# Patient Record
Sex: MALE | Race: WHITE | NOT HISPANIC OR LATINO | Employment: UNEMPLOYED | URBAN - METROPOLITAN AREA
[De-identification: names, ages, dates, MRNs, and addresses within clinical notes are randomized per-mention and may not be internally consistent; named-entity substitution may affect disease eponyms.]

---

## 2017-01-10 NOTE — PROGRESS NOTES
SUBJECTIVE:                                                    Alejandro Alves is a 51 year old male who presents to clinic today for the following health issues: Patient is here with significant other     The 10-year ASCVD risk score (Corrina MG Jr., et al., 2013) is: 6.1%    Values used to calculate the score:      Age: 51 years      Sex: Male      Is an : No      Diabetic: No      Tobacco smoker: Yes      Systolic Blood Pressure: 101 mmHg      Prescribed Antihypertensives: No      HDL Cholesterol: 47 mg/dL      Total Cholesterol: 198 mg/dL  Patient is eligible for use of low-dose aspirin for primary prevention of heart attack and stroke.  Provider has discussed aspirin with patient and our decision was:             Arm Pain     Onset: 3 weeks ago      Description:   Location: right shoulder/right arm  Character: Sharp    Intensity: 8/10 (at its worst)    Progression of Symptoms: same    Accompanying Signs & Symptoms:  Other symptoms: radiation of pain to hand   History:   Previous similar pain: no       Precipitating factors:   Trauma or overuse: YES- patient fell on ice and fell on the right arm and shoulder , was seen in the ER, had an x ray which showed no abnormality , but reports pain is worse    Alleviating factors:  Improved by: NSAID - ibuprofen and arthritis strength tylenol        Therapies Tried and outcome: Patient has tried icing his shoulder and alternating tylenol and ibuprofen and he gets little to no relief.  Reports pain at night and laying on the shoulder he screams . He reports frostbite symptoms better     Significant other will like me to check a lump on the upper back , has been present for a long time , at times reduces in size .           Problem list and histories reviewed & adjusted, as indicated.  Additional history: as documented    Patient Active Problem List   Diagnosis     Tobacco use disorder     Restless legs syndrome (RLS)     Hip pain, right     Past  "Surgical History   Procedure Laterality Date     Colonoscopy N/A 6/6/2016     Procedure: COMBINED COLONOSCOPY, SINGLE OR MULTIPLE BIOPSY/POLYPECTOMY BY BIOPSY;  Surgeon: Theron Vigil MD;  Location:  GI       Social History   Substance Use Topics     Smoking status: Current Every Day Smoker -- 1.00 packs/day     Smokeless tobacco: Not on file      Comment: patient states he would like to      Alcohol Use: No     History reviewed. No pertinent family history.      Current Outpatient Prescriptions   Medication Sig Dispense Refill     traMADol (ULTRAM) 50 MG tablet Take 1-2 tablets ( mg) by mouth every 6 hours as needed for pain maximum 4-8 tablet(s) per day 30 tablet 0     ibuprofen (ADVIL/MOTRIN) 600 MG tablet Take 1 tablet (600 mg) by mouth every 6 hours as needed for moderate pain 30 tablet 0     rOPINIRole (REQUIP) 0.25 MG tablet Take 3 tablets (0.75 mg) by mouth 3 times daily 810 tablet 3     Ferrous Sulfate (IRON SUPPLEMENT PO)        No Known Allergies  BP Readings from Last 3 Encounters:   01/11/17 116/60   12/27/16 101/78   06/06/16 109/73    Wt Readings from Last 3 Encounters:   01/11/17 164 lb 1.6 oz (74.435 kg)   12/27/16 161 lb 7 oz (73.228 kg)   05/02/16 181 lb 9.6 oz (82.373 kg)                  Labs reviewed in EPIC  Problem list, Medication list, Allergies, and Medical/Social/Surgical histories reviewed in [facility] and updated as appropriate.    ROS:  C: NEGATIVE for fever, chills, change in weight  INTEGUMENTARY/SKIN: POSITIVE for lump of skin   E/M: NEGATIVE for ear, mouth and throat problems  R: NEGATIVE for significant cough or SOB  CV: NEGATIVE for chest pain, palpitations or peripheral edema  MUSCULOSKELETAL: POSITIVE  for right shoulder pain due to injury     OBJECTIVE:                                                    /60 mmHg  Pulse 78  Temp(Src) 97.9  F (36.6  C) (Oral)  Resp 14  Ht 5' 8.5\" (1.74 m)  Wt 164 lb 1.6 oz (74.435 kg)  BMI 24.59 kg/m2  Body mass index " is 24.59 kg/(m^2).   GENERAL: alert, well nourished, well hydrated, no distress    NECK: no tenderness, no adenopathy, no asymmetry, no masses, no stiffness; thyroid- normal to palpation  RESP: lungs clear to auscultation - no rales, no rhonchi, no wheezes  CV: regular rates and rhythm, normal S1 S2, no S3 or S4 and no murmur, no click or rub -  ABDOMEN: soft, no tenderness, no  hepatosplenomegaly, no masses, normal bowel sounds  SHOULDER Exam-Right   Inspection: no swelling, no bruising, no discoloration, no obvious deformity, no asymmetry, no glenohumeral joint anterior bulge, no distal clavicle elevation, no muscle atrophy, no scapular winging   Tenderness of: SC joint- no , clavicle(prox-mid)- no , clavicle-(mid-distal)- no , AC joint- YES, acromion- YES, anterior capsule- YES, prox bicep tendon- no , greater tuberosity- no , prox humerus- no , supraspinatous- YES, infraspinatous- YES, superior trapezious- YES, rhomboids- no    Range of Motion: Active- limited due to pain.  Range of Motion: Passive- limited due to pain.   Strength: forward flexion- 5/5 and abduction- 5/5, painful   Special tests: Nunez(supraspinatous)- POSITIVE   Opposite shoulder , exam is normal      SKIN:  Well rounded mass about 2 cm by 0.5 cm in the mid upper back , slightly mobile     Diagnostic test results:  Diagnostic Test Results:  Reviewed previous x ray      ASSESSMENT/PLAN:                                                    1. Right shoulder pain, unspecified chronicity  Discussed with patient , suspect rotator cuff pathology , likely a tear. Will get MRI for further evaluation   - MR Shoulder Right w/o Contrast; Future  - ORTHO  REFERRAL  - traMADol (ULTRAM) 50 MG tablet; Take 1-2 tablets ( mg) by mouth every 6 hours as needed for pain maximum 4-8 tablet(s) per day  Dispense: 30 tablet; Refill: 0    2. Sebaceous cyst  Discussed with patient and partner , usually benign  Plan :Discussed conservative management , if  not better recommend incision and drainage       Follow up With MRI result     Time: I spent 25 minutes of face- face time with patient  greater than one half devoted to coordination of care for diagnosis and plan above.           Marielena Keenan MD, MD  [facility]

## 2017-01-11 ENCOUNTER — OFFICE VISIT (OUTPATIENT)
Dept: FAMILY MEDICINE | Facility: OTHER | Age: 52
End: 2017-01-11
Payer: COMMERCIAL

## 2017-01-11 VITALS
HEART RATE: 78 BPM | TEMPERATURE: 97.9 F | BODY MASS INDEX: 24.3 KG/M2 | DIASTOLIC BLOOD PRESSURE: 60 MMHG | HEIGHT: 69 IN | RESPIRATION RATE: 14 BRPM | SYSTOLIC BLOOD PRESSURE: 116 MMHG | WEIGHT: 164.1 LBS

## 2017-01-11 DIAGNOSIS — L72.3 SEBACEOUS CYST: ICD-10-CM

## 2017-01-11 DIAGNOSIS — M25.511 RIGHT SHOULDER PAIN, UNSPECIFIED CHRONICITY: Primary | ICD-10-CM

## 2017-01-11 PROCEDURE — 99214 OFFICE O/P EST MOD 30 MIN: CPT | Performed by: FAMILY MEDICINE

## 2017-01-11 RX ORDER — TRAMADOL HYDROCHLORIDE 50 MG/1
50-100 TABLET ORAL EVERY 6 HOURS PRN
Qty: 30 TABLET | Refills: 0 | Status: SHIPPED | OUTPATIENT
Start: 2017-01-11 | End: 2018-08-27

## 2017-01-11 ASSESSMENT — PAIN SCALES - GENERAL: PAINLEVEL: EXTREME PAIN (8)

## 2017-01-11 NOTE — NURSING NOTE
"Chief Complaint   Patient presents with     Musculoskeletal Problem     Panel Management     tdap, flu       Initial /60 mmHg  Pulse 78  Temp(Src) 97.9  F (36.6  C) (Oral)  Resp 14  Ht 5' 8.5\" (1.74 m)  Wt 164 lb 1.6 oz (74.435 kg)  BMI 24.59 kg/m2 Estimated body mass index is 24.59 kg/(m^2) as calculated from the following:    Height as of this encounter: 5' 8.5\" (1.74 m).    Weight as of this encounter: 164 lb 1.6 oz (74.435 kg).  BP completed using cuff size: regular    Padmini Miramontes MA    "

## 2017-01-11 NOTE — MR AVS SNAPSHOT
After Visit Summary   1/11/2017    Alejandro Alves    MRN: [id number]           Patient Information     Date Of Birth          1965        Visit Information        Provider Department      1/11/2017 10:40 AM Marielena Keenan MD [facility]        Today's Diagnoses     Right shoulder pain, unspecified chronicity    -  1     Sebaceous cyst           Care Instructions      Understanding Rotator Cuff Injuries  The rotator cuff is a team of muscles and connecting tendons in the shoulder. It attaches your upper arm to your shoulder blade. Your rotator cuff helps you reach, throw, push, pull, and lift. Without it, your shoulder can't do its job properly.        Overuse tendonitis is irritation, bruising, or fraying of the rotator cuff.       A healthy rotator cuff  A healthy rotator cuff gives your shoulder flexibility and control. The rotator cuff holds your upper arm bone (humerus) in your shoulder socket (glenoid). It also helps move the shoulder.  A damaged rotator cuff  Pain and weakness told you that something was wrong with your shoulder. Now you know it s a rotator cuff problem. Rotator cuff tendons can become damaged or inflamed. This is called tendonitis. Possible causes include:    Irritation from overuse    Bursal inflammation (bursitis)    Pinching (impingement)    Calcium deposits (calcification)    Tears in the tendon.  Getting your shoulder healthy again  Care for your shoulder will most likely begin with nonsurgical treatments. You may start with simple rest. If needed, you may have injections that decrease inflammation and pain. Your healthcare provider will tell you how often you may need these treatments. If rest and injections relieve your pain, you will be given an exercise program. This will help restore your shoulder s strength and function. If your pain continues, your healthcare provider may suggest surgery to repair the rotator cuff.    [id number]  [employer]. [address] [place] [address], [place] [address]. All rights reserved. This information is not intended as a substitute for professional medical care. Always follow your healthcare professional's instructions.        Sebaceous Cyst [No Infection]  A sebaceous cyst is a term that most commonly refers to 2 types of cysts:  epidermoid  (from skin), and  pilar  (from hair follicles). A few things about these cysts:    A cyst is a sac filled with material that is often cheesy, fatty, oily, or fibrous material. The material in them can be thick (like cottage cheese) or liquid.    They form slowly under the skin, and can be found on most parts of the body. They are most often found in hairier areas like the scalp, face, upper back, and genitals.    You can usually move them slightly if you try.    They can be smaller than a pea or as large as a couple of inches.    They are usually not painful, unless they become inflamed or infected.    The area around the cyst may smell bad, and if the cyst breaks open, the material inside it often smells bad as well.  Causes  Sebaceous cysts are often caused when skin (epidermal) cells move under the skin surface, or are covered over by it, but continue to multiply, like skin does normally. They then form a wall around themselves (cyst) and secrete normal skin fluids (keratin). This can happen for developmental reason, but is often from skin trauma.  Cysts are often found around hair follicles, which in a sense are like cysts, but with openings, through which normal lubricating oils for your hair are excreted. The opening can become blocked or the site inflamed, causing a cyst. This often occurs when there is damage to the hair follicles by an abrasion or wound.  Symptoms  The following are symptoms of a sebaceous cyst:    Feeling a lump just beneath the skin.    It may or may not be painful.    The cyst may or may not smell bad.    The cyst may become inflamed or  red.    The cyst may leak fluid or thick material.  Home care  Sebaceous cysts often go away without any treatment. If your sebaceous cyst doesn't, and is bothersome, it may be drained or removed. If the cyst drains on its own, it may return. Resist the temptation to squeeze, pop, stick a needle in it, or cut it open. This often leads to an infection and scarring. If it gets severely inflamed or infected, you should seek medical treatment. Be sure to clean the cyst area when bathing or showering. Watch for the signs of infection listed below.  Follow-up care  Follow up with your doctor or as advised by our staff.  When to seek medical care  Get prompt medical attention if any of the following occur:    Swelling, redness, or pain    Pus coming from the cyst    4267-9597 [employer]. [address] [place] [address]. All rights reserved. This information is not intended as a substitute for professional medical care. Always follow your healthcare professional's instructions.              Follow-ups after your visit        Additional Services     ORTHO  REFERRAL       [facility] is referring you to the Orthopedic  Services at [facility].       The  Representative will assist you in the coordination of your Orthopedic and Musculoskeletal Care as prescribed by your physician.    The  Representative will call you within 1 business day to help schedule your appointment, or you may contact the  Representative at:    All areas ~ [phone]     Type of Referral : Surgical / Specialist       Timeframe requested: 3 - 5 days    Coverage of these services is subject to the terms and limitations of your health insurance plan.  Please call member services at your health plan with any benefit or coverage questions.      If X-rays, CT or MRI's have been performed, please contact the facility where they were done to arrange  for , prior to your scheduled appointment.  Please bring this referral request to your appointment and present it to your specialist.                  Your next 10 appointments already scheduled     Jan 13, 2017 11:00 AM   MR SHOULDER RIGHT W/O CONTRAST with PHMR1   [facility] ([facility])    [address] [facility] [id number] [phone]           Take your medicines as usual, unless your doctor tells you not to. Bring a list of your current medicines to your exam (including vitamins, minerals and over-the-counter drugs). Also bring the results of similar scans you may have had.  Please remove any body piercings and hair extensions before you arrive.  Follow your doctor s orders. If you do not, we may have to postpone your exam.  You will not have contrast for this exam. You do not need to do anything special to prepare.  The MRI machine uses a strong magnet. Please wear clothes without metal (snaps, zippers). A sweatsuit works well, or we may give you a hospital gown.   **IMPORTANT** THE INSTRUCTIONS BELOW ARE ONLY FOR THOSE PATIENTS WHO HAVE BEEN TOLD THEY WILL RECEIVE SEDATION OR GENERAL ANESTHESIA DURING THEIR MRI PROCEDURE:  IF YOU WILL RECEIVE SEDATION (take medicine to help you relax during your exam):   You must get the medicine from your doctor before you arrive. Bring the medicine to the exam. Do not take it at home.   Arrive one hour early. Bring someone who can take you home after the test. Your medicine will make you sleepy. After the exam, you may not drive, take a bus or take a taxi by yourself.   No eating 8 hours before your exam. You may have clear liquids up until 4 hours before your exam. (Clear liquids include water, clear tea, black coffee and fruit juice without pulp.)  IF YOU WILL RECEIVE ANESTHESIA (be asleep for your exam):   Arrive 1 1/2 hours early. Bring someone who can take you home after the test. You may not drive, take a bus or take a  "taxi by yourself.   No eating 8 hours before your exam. You may have clear liquids up until 4 hours before your exam. (Clear liquids include water, clear tea, black coffee and fruit juice without pulp.)   You will spend four to five hours in the recovery room.  Please call the Imaging Department at your exam site with any questions.              Future tests that were ordered for you today     Open Future Orders        Priority Expected Expires Ordered    MR Shoulder Right w/o Contrast Routine  2018            Who to contact     If you have questions or need follow up information about today's clinic visit or your schedule please contact [facility] directly at [phone].  Normal or non-critical lab and imaging results will be communicated to you by BTIGhart, letter or phone within 4 business days after the clinic has received the results. If you do not hear from us within 7 days, please contact the clinic through BTIGhart or phone. If you have a critical or abnormal lab result, we will notify you by phone as soon as possible.  Submit refill requests through [employer] or call your pharmacy and they will forward the refill request to us. Please allow 3 business days for your refill to be completed.          Additional Information About Your Visit        [employer]IG[employer]pe Information     [employer] lets you send messages to your doctor, view your test results, renew your prescriptions, schedule appointments and more. To sign up, go to [email or web address] . Click on \"Log in\" on the left side of the screen, which will take you to the Welcome page. Then click on \"Sign up Now\" on the right side of the page.     You will be asked to enter the access code listed below, as well as some personal information. Please follow the directions to create your username and password.     Your access code is: [email or web address]  Expires: 3/27/2017  3:40 PM     Your access code will  in 90 days. If you need help or a " "new code, please call your [facility] or [phone].        Care EveryWhere ID     This is your Care EveryWhere ID. This could be used by other organizations to access your [facility] medical records  [id number]        Your Vitals Were     Pulse Temperature Respirations Height BMI (Body Mass Index)       78 97.9  F (36.6  C) (Oral) 14 5' 8.5\" (1.74 m) 24.59 kg/m2        Blood Pressure from Last 3 Encounters:   01/11/17 116/60   12/27/16 101/78   06/06/16 109/73    Weight from Last 3 Encounters:   01/11/17 164 lb 1.6 oz (74.435 kg)   12/27/16 161 lb 7 oz (73.228 kg)   05/02/16 181 lb 9.6 oz (82.373 kg)              We Performed the Following     ORTHO  REFERRAL          Today's Medication Changes          These changes are accurate as of: 1/11/17 11:30 AM.  If you have any questions, ask your nurse or doctor.               Start taking these medicines.        Dose/Directions    traMADol 50 MG tablet   Commonly known as:  ULTRAM   Used for:  Right shoulder pain, unspecified chronicity   Started by:  Marielena Keenan MD        Dose:   mg   Take 1-2 tablets ( mg) by mouth every 6 hours as needed for pain maximum 4-8 tablet(s) per day   Quantity:  30 tablet   Refills:  0            Where to get your medicines      Some of these will need a paper prescription and others can be bought over the counter.  Ask your nurse if you have questions.     Bring a paper prescription for each of these medications    - traMADol 50 MG tablet             Primary Care Provider Office Phone # Fax #    Marielena Keenan [phone]       [facility] [address] [place] [address]        Thank you!     Thank you for choosing [facility]  for your care. Our goal is always to provide you with excellent care. Hearing back from our patients is one way we can continue to improve our services. Please take a few minutes to complete the written survey that you may " receive in the mail after your visit with us. Thank you!             Your Updated Medication List - Protect others around you: Learn how to safely use, store and throw away your medicines at www.disposemymeds.org.          This list is accurate as of: 1/11/17 11:30 AM.  Always use your most recent med list.                   Brand Name Dispense Instructions for use    ibuprofen 600 MG tablet    ADVIL/MOTRIN    30 tablet    Take 1 tablet (600 mg) by mouth every 6 hours as needed for moderate pain       IRON SUPPLEMENT PO          rOPINIRole 0.25 MG tablet    REQUIP    810 tablet    Take 3 tablets (0.75 mg) by mouth 3 times daily       traMADol 50 MG tablet    ULTRAM    30 tablet    Take 1-2 tablets ( mg) by mouth every 6 hours as needed for pain maximum 4-8 tablet(s) per day

## 2017-01-11 NOTE — PATIENT INSTRUCTIONS
Understanding Rotator Cuff Injuries  The rotator cuff is a team of muscles and connecting tendons in the shoulder. It attaches your upper arm to your shoulder blade. Your rotator cuff helps you reach, throw, push, pull, and lift. Without it, your shoulder can't do its job properly.        Overuse tendonitis is irritation, bruising, or fraying of the rotator cuff.       A healthy rotator cuff  A healthy rotator cuff gives your shoulder flexibility and control. The rotator cuff holds your upper arm bone (humerus) in your shoulder socket (glenoid). It also helps move the shoulder.  A damaged rotator cuff  Pain and weakness told you that something was wrong with your shoulder. Now you know it s a rotator cuff problem. Rotator cuff tendons can become damaged or inflamed. This is called tendonitis. Possible causes include:    Irritation from overuse    Bursal inflammation (bursitis)    Pinching (impingement)    Calcium deposits (calcification)    Tears in the tendon.  Getting your shoulder healthy again  Care for your shoulder will most likely begin with nonsurgical treatments. You may start with simple rest. If needed, you may have injections that decrease inflammation and pain. Your healthcare provider will tell you how often you may need these treatments. If rest and injections relieve your pain, you will be given an exercise program. This will help restore your shoulder s strength and function. If your pain continues, your healthcare provider may suggest surgery to repair the rotator cuff.    3611-5823 [employer]. [address] [place] [address]. All rights reserved. This information is not intended as a substitute for professional medical care. Always follow your healthcare professional's instructions.        Sebaceous Cyst [No Infection]  A sebaceous cyst is a term that most commonly refers to 2 types of cysts:  epidermoid  (from skin), and  pilar  (from hair follicles). A few things about  these cysts:    A cyst is a sac filled with material that is often cheesy, fatty, oily, or fibrous material. The material in them can be thick (like cottage cheese) or liquid.    They form slowly under the skin, and can be found on most parts of the body. They are most often found in hairier areas like the scalp, face, upper back, and genitals.    You can usually move them slightly if you try.    They can be smaller than a pea or as large as a couple of inches.    They are usually not painful, unless they become inflamed or infected.    The area around the cyst may smell bad, and if the cyst breaks open, the material inside it often smells bad as well.  Causes  Sebaceous cysts are often caused when skin (epidermal) cells move under the skin surface, or are covered over by it, but continue to multiply, like skin does normally. They then form a wall around themselves (cyst) and secrete normal skin fluids (keratin). This can happen for developmental reason, but is often from skin trauma.  Cysts are often found around hair follicles, which in a sense are like cysts, but with openings, through which normal lubricating oils for your hair are excreted. The opening can become blocked or the site inflamed, causing a cyst. This often occurs when there is damage to the hair follicles by an abrasion or wound.  Symptoms  The following are symptoms of a sebaceous cyst:    Feeling a lump just beneath the skin.    It may or may not be painful.    The cyst may or may not smell bad.    The cyst may become inflamed or red.    The cyst may leak fluid or thick material.  Home care  Sebaceous cysts often go away without any treatment. If your sebaceous cyst doesn't, and is bothersome, it may be drained or removed. If the cyst drains on its own, it may return. Resist the temptation to squeeze, pop, stick a needle in it, or cut it open. This often leads to an infection and scarring. If it gets severely inflamed or infected, you should seek  medical treatment. Be sure to clean the cyst area when bathing or showering. Watch for the signs of infection listed below.  Follow-up care  Follow up with your doctor or as advised by our staff.  When to seek medical care  Get prompt medical attention if any of the following occur:    Swelling, redness, or pain    Pus coming from the cyst    [id number] [employer]. [address] [place] [address]. All rights reserved. This information is not intended as a substitute for professional medical care. Always follow your healthcare professional's instructions.

## 2017-01-13 ENCOUNTER — HOSPITAL ENCOUNTER (OUTPATIENT)
Dept: MRI IMAGING | Facility: CLINIC | Age: 52
Discharge: HOME | End: 2017-01-13
Attending: FAMILY MEDICINE | Admitting: FAMILY MEDICINE
Payer: COMMERCIAL

## 2017-01-13 DIAGNOSIS — M25.511 RIGHT SHOULDER PAIN, UNSPECIFIED CHRONICITY: ICD-10-CM

## 2017-01-13 PROCEDURE — 73221 MRI JOINT UPR EXTREM W/O DYE: CPT | Mod: RT

## 2017-01-13 NOTE — PROGRESS NOTES
Quick Note:    Your MRI shows a partial tear in your right shoulder as suspected, please follow up with orthopedist as suspected  ______

## 2017-01-13 NOTE — PROGRESS NOTES
Quick Note:    Your MRI shows a partial tear in your right shoulder as suspected, please follow up with orthopedist as scheduled  ______

## 2017-01-26 ENCOUNTER — TELEPHONE (OUTPATIENT)
Dept: FAMILY MEDICINE | Facility: OTHER | Age: 52
End: 2017-01-26

## 2017-01-26 NOTE — TELEPHONE ENCOUNTER
Reason for call:  Results  Reason for Call:  Request for results:    Name of test or procedure: MRI    Date of test of procedure: 1/13/17    Location of the test or procedure: [facility]    OK to leave the result message on voice mail or with a family member? YES    Phone number Patient can be reached at:  Other phone number:  [phone]    Additional comments: Pt appt with Denise keeps getting canceled. He wants to know what is going on with his arm . No one has given him his MRI results . Wanted  to know if Shlomo could help him with anything     Call taken on 1/26/2017 at 1:28 PM by Meron Stanford

## 2017-01-26 NOTE — TELEPHONE ENCOUNTER
Spoke to patient informed of the message reminded of appointment with Dr. Olguin.  Ashley Guardado CMA ([facility])

## 2017-01-26 NOTE — TELEPHONE ENCOUNTER
Left a message for pt.   It looks like the MR result was given to pt on 1/13/17( see below). Ask patient if he have any questions.     Notes Recorded by Zaira Tobias on 1/13/2017 at 2:50 PM  Patient informed.  ------    Notes Recorded by Marielena Keenan MD on 1/13/2017 at 1:16 PM  Your MRI shows a partial tear in your right shoulder as suspected, please follow up with orthopedist as scheduled  ------

## 2017-02-08 ENCOUNTER — RADIANT APPOINTMENT (OUTPATIENT)
Dept: GENERAL RADIOLOGY | Facility: CLINIC | Age: 52
End: 2017-02-08
Attending: ORTHOPAEDIC SURGERY
Payer: COMMERCIAL

## 2017-02-08 ENCOUNTER — OFFICE VISIT (OUTPATIENT)
Dept: ORTHOPEDICS | Facility: CLINIC | Age: 52
End: 2017-02-08
Payer: COMMERCIAL

## 2017-02-08 VITALS — TEMPERATURE: 97.3 F | BODY MASS INDEX: 23.7 KG/M2 | WEIGHT: 160 LBS | HEIGHT: 69 IN

## 2017-02-08 DIAGNOSIS — M25.511 RIGHT SHOULDER PAIN: ICD-10-CM

## 2017-02-08 DIAGNOSIS — M75.41 SUBACROMIAL IMPINGEMENT, RIGHT: ICD-10-CM

## 2017-02-08 DIAGNOSIS — M75.111 INCOMPLETE TEAR OF RIGHT ROTATOR CUFF: Primary | ICD-10-CM

## 2017-02-08 DIAGNOSIS — S43.431A SUPERIOR GLENOID LABRUM LESION OF RIGHT SHOULDER, INITIAL ENCOUNTER: ICD-10-CM

## 2017-02-08 PROCEDURE — 99244 OFF/OP CNSLTJ NEW/EST MOD 40: CPT | Performed by: ORTHOPAEDIC SURGERY

## 2017-02-08 PROCEDURE — 73030 X-RAY EXAM OF SHOULDER: CPT | Mod: TC

## 2017-02-08 RX ORDER — MELOXICAM 15 MG/1
15 TABLET ORAL DAILY
Qty: 30 TABLET | Refills: 1 | Status: SHIPPED | OUTPATIENT
Start: 2017-02-08 | End: 2018-08-27

## 2017-02-08 ASSESSMENT — PAIN SCALES - GENERAL: PAINLEVEL: MODERATE PAIN (4)

## 2017-02-08 NOTE — MR AVS SNAPSHOT
"              After Visit Summary   2017    Alejandro Alves    MRN: [id number]           Patient Information     Date Of Birth          1965        Visit Information        Provider Department      2017 3:10 PM Paul Nguyen, DO [facility]         Follow-ups after your visit        Who to contact     If you have questions or need follow up information about today's clinic visit or your schedule please contact [facility] directly at [phone].  Normal or non-critical lab and imaging results will be communicated to you by MyChart, letter or phone within 4 business days after the clinic has received the results. If you do not hear from us within 7 days, please contact the clinic through [employer] or phone. If you have a critical or abnormal lab result, we will notify you by phone as soon as possible.  Submit refill requests through [employer] or call your pharmacy and they will forward the refill request to us. Please allow 3 business days for your refill to be completed.          Additional Information About Your Visit        [facility] Information     [employer] lets you send messages to your doctor, view your test results, renew your prescriptions, schedule appointments and more. To sign up, go to [email or web address] . Click on \"Log in\" on the left side of the screen, which will take you to the Welcome page. Then click on \"Sign up Now\" on the right side of the page.     You will be asked to enter the access code listed below, as well as some personal information. Please follow the directions to create your username and password.     Your access code is: [email or web address]  Expires: 3/27/2017  3:40 PM     Your access code will  in 90 days. If you need help or a new code, please call your [facility] or [phone].        Care EveryWhere ID     This is your Care EveryWhere ID. This could be used by other organizations to access your [facility] medical " "records  [id number]        Your Vitals Were     Temperature Height BMI (Body Mass Index)             97.3  F (36.3  C) (Temporal) 1.74 m (5' 8.5\") 23.97 kg/m2          Blood Pressure from Last 3 Encounters:   01/11/17 116/60   12/27/16 101/78   06/06/16 109/73    Weight from Last 3 Encounters:   02/08/17 72.576 kg (160 lb)   01/11/17 74.435 kg (164 lb 1.6 oz)   12/27/16 73.228 kg (161 lb 7 oz)              Today, you had the following     No orders found for display       Primary Care Provider Office Phone # Fax #    Marielena Erin Keenan [phone]       [facility] [address] [place] [address]        Thank you!     Thank you for choosing [facility]  for your care. Our goal is always to provide you with excellent care. Hearing back from our patients is one way we can continue to improve our services. Please take a few minutes to complete the written survey that you may receive in the mail after your visit with us. Thank you!             Your Updated Medication List - Protect others around you: Learn how to safely use, store and throw away your medicines at www.disposemymeds.org.          This list is accurate as of: 2/8/17  3:24 PM.  Always use your most recent med list.                   Brand Name Dispense Instructions for use    ibuprofen 600 MG tablet    ADVIL/MOTRIN    30 tablet    Take 1 tablet (600 mg) by mouth every 6 hours as needed for moderate pain       IRON SUPPLEMENT PO          rOPINIRole 0.25 MG tablet    REQUIP    810 tablet    Take 3 tablets (0.75 mg) by mouth 3 times daily       traMADol 50 MG tablet    ULTRAM    30 tablet    Take 1-2 tablets ( mg) by mouth every 6 hours as needed for pain maximum 4-8 tablet(s) per day         "

## 2017-02-08 NOTE — NURSING NOTE
"Chief Complaint   Patient presents with     Shoulder Pain     Right shoulder pain since 12/24/16-fell     Consult     ref: Dr. Keenan       Initial Temp(Src) 97.3  F (36.3  C) (Temporal)  Ht 1.74 m (5' 8.5\")  Wt 72.576 kg (160 lb)  BMI 23.97 kg/m2 Estimated body mass index is 23.97 kg/(m^2) as calculated from the following:    Height as of this encounter: 1.74 m (5' 8.5\").    Weight as of this encounter: 72.576 kg (160 lb).  Medication Reconciliation: complete   .Alma Delia/LENI     "

## 2017-02-09 PROBLEM — M75.111 INCOMPLETE TEAR OF RIGHT ROTATOR CUFF: Status: ACTIVE | Noted: 2017-02-09

## 2017-02-09 PROBLEM — S43.431A SUPERIOR GLENOID LABRUM LESION OF RIGHT SHOULDER, INITIAL ENCOUNTER: Status: ACTIVE | Noted: 2017-02-09

## 2017-02-09 PROBLEM — M75.41 SUBACROMIAL IMPINGEMENT, RIGHT: Status: ACTIVE | Noted: 2017-02-09

## 2017-02-09 NOTE — PROGRESS NOTES
ORTHOPEDIC CONSULT      Chief Complaint: Alejandro Alves is a 51 year old male who is being seen for Chief Complaint   Patient presents with     Shoulder Pain     Right shoulder pain since 12/24/16-fell     Consult     ref: Dr. Keenan       History of Present Illness:   Alejandro Alves is a 51 year old male who is seen in consultation at the request of dr Keenan for evaluation of right shoulder pain.  Mechanism of Injury: Fell up proximally 4 feet off scaffolding landing on his shoulder.  Location: right shoulder posterior  Duration of Pain:  Date of injury: December 24, 2016  Rating of Pain:  moderate.    Pain Quality: aching and sharp  Pain is better with: Rest  Pain is worse with:  overhead reaching, reaching behind body, reaching to the side of the body, reaching forward, reaching across body  Treatment so far consists of:  rest, Ibuprofen, Tylenol, activity modification.   Associated Features: Weakness  Prior history of related problems:   No previous problem in the affected area.  Results of his wife.  Has difficulties putting on his shirt.          Patient's past medical, surgical, social and family histories reviewed.     History reviewed. No pertinent past medical history.    Past Surgical History   Procedure Laterality Date     Colonoscopy N/A 6/6/2016     Procedure: COMBINED COLONOSCOPY, SINGLE OR MULTIPLE BIOPSY/POLYPECTOMY BY BIOPSY;  Surgeon: Theron Vigil MD;  Location:  GI       Medications:    Current Outpatient Prescriptions on File Prior to Visit:  traMADol (ULTRAM) 50 MG tablet Take 1-2 tablets ( mg) by mouth every 6 hours as needed for pain maximum 4-8 tablet(s) per day   ibuprofen (ADVIL/MOTRIN) 600 MG tablet Take 1 tablet (600 mg) by mouth every 6 hours as needed for moderate pain   rOPINIRole (REQUIP) 0.25 MG tablet Take 3 tablets (0.75 mg) by mouth 3 times daily   Ferrous Sulfate (IRON SUPPLEMENT PO)      No current facility-administered medications on file  "prior to visit.    No Known Allergies    Social History     Occupational History     Not on file.     Social History Main Topics     Smoking status: Current Every Day Smoker -- 1.00 packs/day     Smokeless tobacco: Not on file      Comment: patient states he would like to      Alcohol Use: No     Drug Use: Yes      Comment: cocaine- patient states he quit 6 weeks ago, he is currently at [employer]      Sexual Activity:     Partners: Female     Birth Control/ Protection: None       History reviewed. No pertinent family history.    REVIEW OF SYSTEMS  10 point review systems performed otherwise negative as noted as per history of present illness.    Physical Exam:  Vitals: Temp(Src) 97.3  F (36.3  C) (Temporal)  Ht 5' 8.5\" (1.74 m)  Wt 160 lb (72.576 kg)  BMI 23.97 kg/m2  BMI= Body mass index is 23.97 kg/(m^2).  Constitutional: healthy, alert and no acute distress   Psychiatric: mentation appears normal and affect normal/bright  NEURO: no focal deficits  RESP: Normal with easy respirations and no use of accessory muscles to breathe, no audible wheezing or retractions  CV: RUE:  no edema         Regular rate and rhythm by palpation  SKIN: No erythema, rashes, excoriation, or breakdown. No evidence of infection.   JOINT/EXTREMITIES:right   SHOULDER Exam-Right   Inspection: no swelling, no bruising, no discoloration, no obvious deformity, no asymmetry, no glenohumeral joint anterior bulge, no distal clavicle elevation, no muscle atrophy, no scapular winging   Tenderness of: SC joint- no , clavicle(prox-mid)- no , clavicle-(mid-distal)- no , AC joint- no , acromion- no , anterior capsule- YES, prox bicep tendon- YES, greater tuberosity- no , prox humerus- no , supraspinatous- no , infraspinatous- no , superior trapezious- no , rhomboids- no    Range of Motion: Active- forward flexion- normal, abduction- normal, external rotation- normal, internal rotation- normal  Range of Motion: Passive- forward flexion- normal, " abduction- normal, external rotation- normal, internal rotation- normal   Strength: forward flexion- 4/5, painful, abduction- 4-/5, painful, internal rotation- 4/5, external rotation- 4/5 and bicep- full   Special tests: Neers- POSITIVE, Nunez(supraspinatous)- POSITIVE, O'Briens- POSITIVE, cross arm adduction- negative, Speeds- POSITIVE and Yergasons- negative         Lymph: No peripheral lymphadenopathy  GAIT: not tested     Diagnostic Modalities:  right shoulder X-ray: Well preserved glenohumeral articular space. No fracture, dislocation and or lesion. , flat acromion  right shoulder MRI:  High-grade articular partial tear distal supraspinatus affecting approximate 70% of the tendon. Mild distal supraspinatus tendinopathy. SLAP tear  Independent visualization of the images was performed.      Impression: right shoulder high-grade partial supraspinatus tendon tear (70%), superior labrum anterior posterior tear, subacromial impingement    Plan:  All of the above pertinent physical exam and imaging modalities findings was reviewed with Alejandro and his wife.    Options discussed. It is relatively small high-grade partial tear. Therefore recommended some physical therapy, anti-inflammatories, intra-articular steroid injection. I have referred him to radiology to have the injection performed under fluoroscopy. Prescription of meloxicam provided today.    I have prescribed an antiinflammatory medication.  We discussed that it is the same class of some the common over the counter medications (Ibuprofen, Advil, Motrin, Aleve, Naproxen, and  Naprosyn). I recommend to avoid taking these OTC's medication when taking the medication that I prescribed. This medication should be stopped if having stomach issues, bleeding, high blood pressure and/or chest pain.       Return to clinic 4-6 , week(s), or sooner as needed for changes. If no improvement would consider arthroscopy  Re-x-ray on return: No    Ervin Nguyen D.O.

## 2017-04-27 ENCOUNTER — TELEPHONE (OUTPATIENT)
Dept: ORTHOPEDICS | Facility: CLINIC | Age: 52
End: 2017-04-27

## 2017-04-27 DIAGNOSIS — M75.111 INCOMPLETE TEAR OF RIGHT ROTATOR CUFF: Primary | ICD-10-CM

## 2017-04-27 NOTE — TELEPHONE ENCOUNTER
Reason for Call: Request for an order or referral:    Order or referral being requested: XR Joint Injection Major Right [[id number]] (Order [id number])    Date needed: at your convenience    Has the patient been seen by the PCP for this problem? YES    Additional comments: orders  xray needs them extended please    Phone number Patient can be reached at:  Other phone number:[phone]   Best Time: any    Can we leave a detailed message on this number?  YES    Call taken on 2017 at 10:35 AM by Huong Davis

## 2017-05-10 ENCOUNTER — HOSPITAL ENCOUNTER (OUTPATIENT)
Dept: GENERAL RADIOLOGY | Facility: CLINIC | Age: 52
Discharge: HOME | End: 2017-05-10
Attending: ORTHOPAEDIC SURGERY | Admitting: ORTHOPAEDIC SURGERY
Payer: COMMERCIAL

## 2017-05-10 DIAGNOSIS — M75.111 INCOMPLETE TEAR OF RIGHT ROTATOR CUFF: ICD-10-CM

## 2017-05-10 PROCEDURE — 25000125 ZZHC RX 250: Performed by: ORTHOPAEDIC SURGERY

## 2017-05-10 PROCEDURE — 25500064 ZZH RX 255 OP 636: Performed by: ORTHOPAEDIC SURGERY

## 2017-05-10 PROCEDURE — 20610 DRAIN/INJ JOINT/BURSA W/O US: CPT | Mod: RT

## 2017-05-10 RX ORDER — IOPAMIDOL 408 MG/ML
50 INJECTION, SOLUTION INTRAVASCULAR ONCE
Status: COMPLETED | OUTPATIENT
Start: 2017-05-10 | End: 2017-05-10

## 2017-05-10 RX ORDER — LIDOCAINE HYDROCHLORIDE 10 MG/ML
20 INJECTION, SOLUTION INFILTRATION; PERINEURAL ONCE
Status: COMPLETED | OUTPATIENT
Start: 2017-05-10 | End: 2017-05-10

## 2017-05-10 RX ORDER — BUPIVACAINE HYDROCHLORIDE 2.5 MG/ML
10 INJECTION, SOLUTION INFILTRATION; PERINEURAL ONCE
Status: COMPLETED | OUTPATIENT
Start: 2017-05-10 | End: 2017-05-10

## 2017-05-10 RX ORDER — TRIAMCINOLONE ACETONIDE 40 MG/ML
40 INJECTION, SUSPENSION INTRA-ARTICULAR; INTRAMUSCULAR ONCE
Status: COMPLETED | OUTPATIENT
Start: 2017-05-10 | End: 2017-05-10

## 2017-05-10 RX ADMIN — TRIAMCINOLONE ACETONIDE 40 MG: 40 INJECTION, SUSPENSION INTRA-ARTICULAR; INTRAMUSCULAR at 14:36

## 2017-05-10 RX ADMIN — BUPIVACAINE HYDROCHLORIDE 4 MG: 2.5 INJECTION, SOLUTION INFILTRATION; PERINEURAL at 14:36

## 2017-05-10 RX ADMIN — LIDOCAINE HYDROCHLORIDE 5 ML: 10 INJECTION, SOLUTION INFILTRATION; PERINEURAL at 14:35

## 2017-05-10 RX ADMIN — IOPAMIDOL 1 ML: 408 INJECTION, SOLUTION INTRAVASCULAR at 14:36

## 2018-08-22 ENCOUNTER — TELEPHONE (OUTPATIENT)
Dept: FAMILY MEDICINE | Facility: OTHER | Age: 53
End: 2018-08-22

## 2018-08-22 NOTE — TELEPHONE ENCOUNTER
Reason for call:  Patient reporting a symptom    Symptom or request: ciatic nerve pain in lower back hip area    Duration (how long have symptoms been present): 1 day     Have you been treated for this before? No    Additional comments: pt states wondering what he can do to help the ciatic nerve pain, declined setting up an appt just wants to speak with a  Nurse to see if theres anything he can do at home. Please advise     Phone Number patient can be reached at:  Home number on file [phone] (home)    Best Time:  ANY    Can we leave a detailed message on this number:  YES    Call taken on 8/22/2018 at 1:20 PM by Melvi Concepcion

## 2018-08-22 NOTE — TELEPHONE ENCOUNTER
Left detailed message for patient. Will mail home some stretching activities for him.  But to return call with any questions or concerns    Waldo Morales RN, BSN

## 2018-08-22 NOTE — PATIENT INSTRUCTIONS
When You Have Low Back Pain  Caring for Your Back  You are not alone.  Low back pain is very common. Nearly half of all adults have low back pain in any given year.  The good news is that back pain is rarely a danger to your health. Most people can manage their back pain on their own and about half of them start feeling better within 2 weeks. In 9 out of 10 cases, low back pain goes away or no longer limits daily activity within 6 weeks.  Your outlook is good!  Your symptoms tell us that your low back pain is most likely not a danger to you. Most of the time we do not know the exact cause of low back pain, even if you see a doctor or have an MRI. However, treatment can still work without knowing the cause of the pain. Less than 1 in 100 people need surgery for their back pain.  What can I do about my low back pain?  There are three things you can do to ease low back pain and help it go away.    Use heat or cold packs.    Take medicine as directed.    Use positions, movements and exercises.  Using heat or cold packs  Try cold packs or gentle heat to ease your pain. Use whichever gives you the most relief. Apply the cold pack or heat for 15 minutes at a time, as often as needed.  Taking medicine    If your doctor has prescribed medicine, be sure to follow the directions.    If you take over-the-counter medicine, read and follow the directions.    Talk to your doctor if you have any questions.  Using positions, movements and exercises  Research tells us that moving your joints and muscles can help you recover from back pain. Such activity should be simple and gentle.  Use the positions in the photos as well as walking to help relieve your pain. Try taking a short walk every 3 to 4 hours during the day. Walk for a few minutes inside your home or take longer walks outside, on a treadmill or at a mall. Slowly increase the amount of time you walk.  Expect discomfort when you begin, but it should lessen as your back starts  to heal. When your back feels better, walk daily to keep your back and body healthy.  Finding a comfortable position  When your back pain is new, certain positions will ease your pain. Gently try each of the positions below until you find one that is helpful. Once you find a position of comfort, use it as often as you like when you are resting. You will recover faster if you combine rest with activity.         When should I call my doctor?  Your back pain should improve over the first couple of weeks. As it improves, you should be able to return to your normal activities. But call your doctor if:    You have a sudden change in your ability to control?your bladder or bowels.    You feel tingling in your groin or legs.    The pain spreads down your leg and into your foot.    Your toes, feet or leg muscles feel weak.    You feel generally unwell or sick.    Your pain does not get better or gets worse.    For informational purposes only. Not to replace the advice of your health care provider.  Copyright   2013 [employer] [employer] [facility]. All rights reserved. [employer] 351017 - REV 03/16.    Exercises to Strengthen Your Lower Back  Strong lower back and abdominal muscles work together to support your spine. The exercises below will help strengthen the lower back. It is important that you begin exercising slowly and increase levels gradually.  Always begin any exercise program with stretching. If you feel pain while doing any of these exercises, stop and talk to your doctor about a more specific exercise program that better suits your condition.   Low back stretch  The point of stretching is to make you more flexible and increase your range of motion. Stretch only as much as you are able. Stretch slowly. Do not push your stretch to the limit. If at any point you feel pain while stretching, this is your (temporary) limit.    Lie on your back with your knees bent and both feet on the ground.    Slowly raise your left knee to  your chest as you flatten your lower back against the floor. Hold for 5 seconds.    Relax and repeat the exercise with your right knee.    Do 10 of these exercises for each leg.    Repeat hugging both knees to your chest at the same time.  Building lower back strength  Start your exercise routine with 10 to 30 minutes a day, 1 to 3 times a day.  Initial exercises  Lying on your back:  1. Ankle pumps: Move your foot up and down, towards your head, and then away. Repeat 10 times with each foot.  2. Heel slides: Slowly bend your knee, drawing the heel of your foot towards you. Then slide your heel/foot from you, straightening your knee. Do not lift your foot off the floor (this is not a leg lift).  3. Abdominal contraction: Bend your knees and put your hands on your stomach. Tighten your stomach muscles. Hold for 5 seconds, then relax. Repeat 10 times.  4. Straight leg raise: Bend one leg at the knee and keep the other leg straight. Tighten your stomach muscles. Slowly lift your straight leg 6 to 12 inches off the floor and hold for up to 5 seconds. Repeat 10 times on each side.  Standin. Wall squats: Stand with your back against the wall. Move your feet about 12 inches away from the wall. Tighten your stomach muscles, and slowly bend your knees until they are at about a 45 degree angle. Do not go down too far. Hold about 5 seconds. Then slowly return to your starting position. Repeat 10 times.  2. Heel raises: Stand facing the wall. Slowly raise the heels of your feet up and down, while keeping your toes on the floor. If you have trouble balancing, you can touch the wall with your hands. Repeat 10 times.  More advanced exercises  When you feel comfortable enough, try these exercises.  1. Kneeling lumbar extension: Begin on your hands and knees. At the same time, raise and straighten your right arm and left leg until they are parallel to the ground. Hold for 2 seconds and come back slowly to a starting position.  "Repeat with left arm and right leg, alternating 10 times.  2. Prone lumbar extension: Lie face down, arms extended overhead, palms on the floor. At the same time, raise your right arm and left leg as high as comfortably possible. Hold for 10 seconds and slowly return to start. Repeat with left arm and right leg, alternating 10 times. Gradually build up to 20 times. (Advanced: Repeat this exercise raising both arms and both legs a few inches off the floor at the same time. Hold for 5 seconds and release.)  3. Pelvic tilt: Lie on the floor on your back with your knees bent at 90 degrees. Your feet should be flat on the floor. Inhale, exhale, then slowly contract your abdominal muscles bringing your navel toward your spine. Let your pelvis rock back until your lower back is flat on the floor. Hold for 10 seconds while breathing smoothly.  4. Abdominal crunch: Perform a pelvic tilt (above) flattening your lower back against the floor. Holding the tension in your abdominal muscles, take another breath and raise your shoulder blades off the ground (this is not a full sit-up). Keep your head in line with your body (don t bend your neck forward). Hold for 2 seconds, then slowly lower.  Date Last Reviewed: 6/1/2016 2000-2017 [employer]. [address] [place] [address], [place] [address]. All rights reserved. This information is not intended as a substitute for professional medical care. Always follow your healthcare professional's instructions.          * Sciatica    Sciatica (\"Lumbar Radiculopathy\") causes a pain that spreads from the lower back down into the buttock, hip and leg. Sometimes leg pain can occur without any back pain. Sciatica is due to irritation or pressure on a spinal nerve as it comes out of the spinal canal. This is most often due to a bulge or rupture of a nearby spinal disk (the cartilage cushion between each spinal bone), which presses on a nearby nerve. Other causes include spinal stenosis " (narrowing of the spinal canal) and spasm of the piriformis muscle (a muscle in the buttocks that the sciatic nerve passes through).  Sciatica may begin after a sudden twisting/bending force (such as in a car accident), or sometimes after a simple awkward movement. In either case, muscle spasm is commonly present and contributes to the pain.  The diagnosis of sciatica is made from the symptoms and physical exam. Unless you had a physical injury (such as a car accident or fall), X-rays are usually not ordered for the initial evaluation of sciatica because the nerves and disks cannot be seen on an X-ray. Most sciatica (80-90%) gets better with time.  What can I do about my low back pain?  There are three main things you can do to ease low back pain and help it go away.    Use heat or cold packs.    Take medicine as directed.    Use positions, movements and exercises. Stay active! Too much rest can make your symptoms worse.  Using heat or cold packs  Try cold packs or gentle heat to ease your pain. Use whichever gives the most relief. Apply the cold pack or heat for 15 minutes at a time, as often as needed.  Taking medicine  If taking over-the-counter medicine:    Take ibuprofen (Advil, Motrin) 600 mg. three times a day as needed for pain.  OR  ? Take Aleve (naproxen sodium) 220 to 440 mg. two times a day as needed for pain  If your doctor prescribed a muscle relaxant (cyclobenzapine 10 mg.):    Take one half ( ) to 1 tablet at bedtime    Do not drive when taking this medicine. This drug may make you sleepy.  Using positions, movements and exercises  Research tells us that moving your joints and muscles can help you recover from back pain. Such activity should be simple and gentle.  Use the positions below as well as walking to help relieve your discomfort. Try taking a short walk every 3 to 4 hours during the day. Walk for a few minutes inside your home or take longer walks outside, on a treadmill or at a mall. Slowly  increase the amount of time you walk. Expect discomfort when you begin, but it should lessen as your back starts to recover.  Finding a position that is comfortable  When your back pain is new, you may find that certain positions will ease your pain. Gently try each of the following positions until you find one that eases your pain. Once you find a position of comfort, use it as often as you like while you recover. Return to your daily routine as soon as possible.     Lie on your back with your legs bent. You can do this by placing a pillow under your knees or lie on the floor and rest your lower legs on the seat of a chair.    Lie on your side with your knees bent and place a pillow between your knees.    Lie on your stomach over pillows.  When should I call my doctor?  Your back pain should improve over the first couple of weeks. As it improves, you should be able to return to your normal activities. But call your doctor if:    You have a sudden change in your ability to control? your bladder or bowels.    You begin to feel tingling in your groin or legs.    The pain spreads down your leg and into your foot.    Your toes, feet or leg muscles begin to feel weak.    You feel generally unwell or sick.    Your pain gets worse.    1229-2643 [employer]. [address] [place] [address], [place] [address]. All rights reserved. This information is not intended as a substitute for professional medical care. Always follow your healthcare professional's instructions.  This information has been modified by your health care provider with permission from the publisher.        Back Exercises: Abdominal Lift Brace with Marching    The abdominal lift brace with march strengthens your lower abdominal muscles, helping you keep your pelvis and back stable:    Lie on the floor with both knees bent. Put your feet flat on the floor and your arms by your sides. Tighten your abdominal muscles. Be sure to continue to breathe.    Lift  one bent knee about 2 inches then return it to the floor and lift the other about 2 inches. Keep your abdominal muscles tight and continue to breathe. These motions should be slow and controlled without your pelvis rocking side to side.    Repeat 10 times.  Date Last Reviewed: 8/16/2015 2000-2017 [employer]. [address] [place] [address]. All rights reserved. This information is not intended as a substitute for professional medical care. Always follow your healthcare professional's instructions.        Back Exercises: Leg Pull    To start, lie on your back with your knees bent and feet flat on the floor. Don t press your neck or lower back to the floor. Breathe deeply. You should feel comfortable and relaxed in this position.    Pull one knee to your chest.    Hold for 30 to 60 seconds. Return to starting position.    Repeat 2 times.    Switch legs.    For a double leg pull, pull both legs to your chest at the same time. Repeat 2 times.  For your safety, check with your healthcare provider before starting an exercise program.   Date Last Reviewed: 8/16/2015 2000-2017 [employer]. [address] [place] [address]. All rights reserved. This information is not intended as a substitute for professional medical care. Always follow your healthcare professional's instructions.        Back Exercises: Lower Back Rotation    To start, lie on your back with your knees bent and feet flat on the floor. Don t press your neck or lower back to the floor. Breathe deeply. You should feel comfortable and relaxed in this position.    Drop both knees to one side. Turn your head to the other side. Keep your shoulders flat on the floor.    Do not push through pain.    Hold for 20 seconds.    Slowly switch sides.    Repeat 2 to 5 times.  Date Last Reviewed: 10/11/2015    1849-3077 [employer]. [address] [place] [id number]. All rights reserved. This  information is not intended as a substitute for professional medical care. Always follow your healthcare professional's instructions.        Back Exercises: Lower Back Stretch    To start, sit in a chair with your feet flat on the floor. Shift your weight slightly forward. Relax, and keep your ears, shoulders, and hips aligned while you do the following:    Sit with your feet well apart.    Bend forward and touch the floor with the backs of your hands. Relax and let your body drop.    Hold for 20 seconds. Return to starting position.    Repeat 2 times.   Date Last Reviewed: 11/1/2017 2000-2017 [employer]. [address] [place] [address]. All rights reserved. This information is not intended as a substitute for professional medical care. Always follow your healthcare professional's instructions.

## 2018-08-27 ENCOUNTER — HOSPITAL ENCOUNTER (EMERGENCY)
Facility: CLINIC | Age: 53
Discharge: HOME | End: 2018-08-27
Attending: NURSE PRACTITIONER | Admitting: NURSE PRACTITIONER
Payer: COMMERCIAL

## 2018-08-27 VITALS
RESPIRATION RATE: 20 BRPM | DIASTOLIC BLOOD PRESSURE: 78 MMHG | HEIGHT: 69 IN | SYSTOLIC BLOOD PRESSURE: 103 MMHG | OXYGEN SATURATION: 98 % | WEIGHT: 162 LBS | BODY MASS INDEX: 23.99 KG/M2 | TEMPERATURE: 98.7 F

## 2018-08-27 DIAGNOSIS — M54.31 RIGHT SIDED SCIATICA: ICD-10-CM

## 2018-08-27 DIAGNOSIS — M79.10 MUSCULAR PAIN: ICD-10-CM

## 2018-08-27 DIAGNOSIS — M54.30 ACUTE SCIATICA: ICD-10-CM

## 2018-08-27 PROCEDURE — 96372 THER/PROPH/DIAG INJ SC/IM: CPT | Performed by: NURSE PRACTITIONER

## 2018-08-27 PROCEDURE — 25000125 ZZHC RX 250: Performed by: NURSE PRACTITIONER

## 2018-08-27 PROCEDURE — 99285 EMERGENCY DEPT VISIT HI MDM: CPT | Performed by: NURSE PRACTITIONER

## 2018-08-27 PROCEDURE — 25000132 ZZH RX MED GY IP 250 OP 250 PS 637: Performed by: NURSE PRACTITIONER

## 2018-08-27 PROCEDURE — 25000128 H RX IP 250 OP 636: Performed by: NURSE PRACTITIONER

## 2018-08-27 PROCEDURE — 99285 EMERGENCY DEPT VISIT HI MDM: CPT | Mod: Z6 | Performed by: NURSE PRACTITIONER

## 2018-08-27 RX ORDER — OXYCODONE HYDROCHLORIDE 5 MG/1
10 TABLET ORAL ONCE
Status: COMPLETED | OUTPATIENT
Start: 2018-08-27 | End: 2018-08-27

## 2018-08-27 RX ORDER — DIAZEPAM 5 MG
5-10 TABLET ORAL EVERY 6 HOURS PRN
Qty: 20 TABLET | Refills: 0 | Status: SHIPPED | OUTPATIENT
Start: 2018-08-27 | End: 2018-11-23

## 2018-08-27 RX ORDER — LIDOCAINE 4 G/G
2 PATCH TOPICAL ONCE
Status: DISCONTINUED | OUTPATIENT
Start: 2018-08-27 | End: 2018-08-28 | Stop reason: HOSPADM

## 2018-08-27 RX ORDER — PREDNISONE 10 MG/1
TABLET ORAL
Qty: 32 TABLET | Refills: 0 | Status: SHIPPED | OUTPATIENT
Start: 2018-08-27 | End: 2018-09-06

## 2018-08-27 RX ORDER — PREDNISONE 20 MG/1
40 TABLET ORAL ONCE
Status: COMPLETED | OUTPATIENT
Start: 2018-08-27 | End: 2018-08-27

## 2018-08-27 RX ORDER — FENTANYL CITRATE 50 UG/ML
50 INJECTION, SOLUTION INTRAMUSCULAR; INTRAVENOUS ONCE
Status: COMPLETED | OUTPATIENT
Start: 2018-08-27 | End: 2018-08-27

## 2018-08-27 RX ORDER — HYDROCODONE BITARTRATE AND ACETAMINOPHEN 5; 325 MG/1; MG/1
1-2 TABLET ORAL EVERY 4 HOURS PRN
Qty: 18 TABLET | Refills: 0 | Status: SHIPPED | OUTPATIENT
Start: 2018-08-27 | End: 2018-11-16

## 2018-08-27 RX ORDER — DIAZEPAM 5 MG
10 TABLET ORAL ONCE
Status: COMPLETED | OUTPATIENT
Start: 2018-08-27 | End: 2018-08-27

## 2018-08-27 RX ADMIN — HYDROMORPHONE HYDROCHLORIDE 1 MG: 1 INJECTION, SOLUTION INTRAMUSCULAR; INTRAVENOUS; SUBCUTANEOUS at 20:14

## 2018-08-27 RX ADMIN — LIDOCAINE 2 PATCH: 560 PATCH PERCUTANEOUS; TOPICAL; TRANSDERMAL at 20:10

## 2018-08-27 RX ADMIN — FENTANYL CITRATE 50 MCG: 50 INJECTION INTRAMUSCULAR; INTRAVENOUS at 22:04

## 2018-08-27 RX ADMIN — PREDNISONE 40 MG: 20 TABLET ORAL at 20:09

## 2018-08-27 RX ADMIN — DIAZEPAM 10 MG: 5 TABLET ORAL at 20:09

## 2018-08-27 RX ADMIN — OXYCODONE HYDROCHLORIDE 10 MG: 5 TABLET ORAL at 20:09

## 2018-08-27 ASSESSMENT — ENCOUNTER SYMPTOMS
BACK PAIN: 1
ARTHRALGIAS: 1
MYALGIAS: 1

## 2018-08-27 NOTE — ED AVS SNAPSHOT
[facility] Emergency Department    [address] [place] [address]    Phone:  [phone]    Fax:  [phone]                                       Alejandro Alves   MRN: [id number]    Department:  [facility] Emergency Department   Date of Visit:  8/27/2018           After Visit Summary Signature Page     I have received my discharge instructions, and my questions have been answered. I have discussed any challenges I see with this plan with the nurse or doctor.    ..........................................................................................................................................  Patient/Patient Representative Signature      ..........................................................................................................................................  Patient Representative Print Name and Relationship to Patient    ..................................................               ................................................  Date                                            Time    ..........................................................................................................................................  Reviewed by Signature/Title    ...................................................              ..............................................  Date                                                            Time          22EPIC Rev 08/18

## 2018-08-27 NOTE — ED AVS SNAPSHOT
[facility]    [address] [place] [address]    Phone:  [phone]    Fax:  [phone]                                       Alejandro Alves   MRN: [id number]    Department:  [facility]   Date of Visit:  8/27/2018           Patient Information     Date Of Birth          1965        Your diagnoses for this visit were:     Acute sciatica Right    Muscular pain Right Shin       You were seen by Sepideh Finney APRN CNP.      Follow-up Information     Follow up with Janelle Ramachandran APRN CNP In 1 week.    Specialty:  Nurse Practitioner - Family    Contact information:    [address] [facility] [address]  [phone]          Follow up with [facility].    Specialty:  EMERGENCY MEDICINE    Why:  If symptoms worsen    Contact information:    [address] [place] [id number] [phone]    Additional information:    From [address]: Exit at [address] on [address] side of [place]. Turn right on [address]. Turn left at stoplight on [address]. [facility] will be in view two blocks ahead        Discharge Instructions         Understanding Lumbar Radiculopathy    Lumbar radiculopathy is irritation or inflammation of a nerve root in the low back. It causes symptoms that spread out from the back down one or both legs. To understand this condition, it helps to understand the parts of the spine:    Vertebrae. These are bones that stack to form the spine. The lumbar spine contains the 5 bottom vertebrae.    Disks. These are soft pads of tissue between the vertebrae. They act as shock absorbers for the spine.    Spinal canal. This is a tunnel formed within the stacked vertebrae. In the lumbar spine, nerves run through this canal.    Nerves. These branch off and leave the spinal canal, traveling out to parts of the body. As they leave the spinal canal, nerves  pass through openings between the vertebrae. The nerve root is the part of the nerve that is closest to the spinal canal.    Sciatic nerve. This is a large nerve formed from several nerve roots in the low back. This nerve extends down the back of the leg to the foot.  With lumbar radiculopathy, nerve roots in the low back become irritated. This leads to pain and symptoms. The sciatic nerve is commonly involved, so the condition is often called sciatica.  What causes lumbar radiculopathy?  Aging, injury, poor posture, extra body weight, and other issues can lead to problems in the low back. These problems may then irritate nerve roots. They include:    Damage to a disk in the lumbar spine. The damaged disk may then press on nearby nerve roots.    Degeneration from wear and tear, and aging. This can lead to narrowing (stenosis) of the openings between the vertebrae. The narrowed openings press on nerve roots as they leave the spinal canal.    Unstable spine. This is when a vertebra slips forward. It can then press on a nerve root.  Other, less common things can put pressure on nerves in the low back. These include diabetes, infection, or a tumor.  Symptoms of lumbar radiculopathy  These include:    Pain in the low back    Pain, numbness, tingling, or weakness that travels into the buttocks, hip, groin, or leg    Muscle spasms  Treatment for lumbar radiculopathy  In most cases, your healthcare provider will first try treatments that help relieve symptoms. These may include:    Prescription and over-the-counter pain medicines. These help relieve pain, swelling, and irritation.    Limits on positions and activities that increase pain. But lying in bed or avoiding all movement is only recommended for a short period of time.    Physical therapy, including exercises and stretches. This helps decrease pain and increase movement and function.    Steroid shots into the lower back. This may help relieve symptoms for a  time.    Weight-loss program. If you are overweight, losing extra pounds may help relieve symptoms.  In some cases, you may need surgery to fix the underlying problem. This depends on the cause, the symptoms, and how long the pain has lasted.  Possible complications  Over time, an irritated and inflamed nerve may become damaged. This may lead to long-lasting (permanent) numbness or weakness in your legs and feet. If symptoms change suddenly or get worse, be sure to let your healthcare provider know.  When to call your healthcare provider  Call your healthcare provider right away if you have any of these:    New pain or pain that gets worse    New or increasing weakness, tingling, or numbness in your leg or foot    Problems controlling your bladder or bowel   Date Last Reviewed: 3/10/2016    7273-2151 [employer]. [address] [place] [address]. All rights reserved. This information is not intended as a substitute for professional medical care. Always follow your healthcare professional's instructions.    Back Care Tips    Caring for your back  These are things you can do to prevent a recurrence of acute back pain and to reduce symptoms from chronic back pain:    Maintain a healthy weight. If you are overweight, losing weight will help most types of back pain.    Exercise is an important part of recovery from most types of back pain. The muscles behind and in front of the spine support the back. This means strengthening both the back muscles and the abdominal muscles will provide better support for your spine.     Swimming and brisk walking are good overall exercises to improve your fitness level.    Practice safe lifting methods (below).    Practice good posture when sitting, standing and walking. Avoid prolonged sitting. This puts more stress on the lower back than standing or walking.    Wear quality shoes with sufficient arch support. Foot and ankle alignment can affect back symptoms.  Women should avoid wearing high heels.    Therapeutic massage can help relax the back muscles without stretching them.    During the first 24 to 72 hours after an acute injury or flare-up of chronic back pain, apply an ice pack to the painful area for 20 minutes and then remove it for 20 minutes, over a period of 60 to 90 minutes, or several times a day. As a safety precaution, do not use a heating pad at bedtime. Sleeping on a heating pad can lead to skin burns or tissue damage.    You can alternate ice and heat therapies.  Medicines  Talk to your healthcare provider before using medicines, especially if you have other medical problems or are taking other medicines.    You may use acetaminophen or ibuprofen to control pain, unless your healthcare provider prescribed other pain medicine. If you have chronic conditions like diabetes, liver or kidney disease, stomach ulcers, or gastrointestinal bleeding, or are taking blood thinners, talk with your healthcare provider before taking any medicines.    Be careful if you are given prescription pain medicines, narcotics, or medicine for muscle spasm. They can cause drowsiness, affect your coordination, reflexes, and judgment. Do not drive or operate heavy machinery while taking these types of medicines. Take prescription pain medicine only as prescribed by your healthcare provider.  Lumbar stretch  Here is a simple stretching exercise that will help relax muscle spasm and keep your back more limber. If exercise makes your back pain worse, don t do it.    Lie on your back with your knees bent and both feet on the ground.    Slowly raise your left knee to your chest as you flatten your lower back against the floor. Hold for 5 seconds.    Relax and repeat the exercise with your right knee.    Do 10 of these exercises for each leg.  Safe lifting method    Don t bend over at the waist to lift an object off the floor.  Instead, bend your knees and hips in a squat.     Keep your  back and head upright    Hold the object close to your body, directly in front of you.    Straighten your legs to lift the object.     Lower the object to the floor in the reverse fashion.    If you must slide something across the floor, push it.  Posture tips  Sitting  Sit in chairs with straight backs or low-back support. Keep your knees lower than your hips, with your feet flat on the floor.  When driving, sit up straight. Adjust the seat forward so you are not leaning toward the steering wheel.  A small pillow or rolled towel behind your lower back may help if you are driving long distances.   Standing  When standing for long periods, shift most of your weight to one leg at a time. Alternate legs every few minutes.   Sleeping  The best way to sleep is on your side with your knees bent. Put a low pillow under your head to support your neck in a neutral spine position. Avoid thick pillows that bend your neck to one side. Put a pillow between your legs to further relax your lower back. If you sleep on your back, put pillows under your knees to support your legs in a slightly flexed position. Use a firm mattress. If your mattress sags, replace it, or use a 1/2-inch plywood board under the mattress to add support.  Follow-up care  Follow up with your healthcare provider, or as advised.  If X-rays, a CT scan or an MRI scan were taken, they will be reviewed by a radiologist. You will be notified of any new findings that may affect your care.  Call 911  Call 911 if any of the following occur:    Trouble breathing    Confusion    Very drowsy    Fainting or loss of consciousness    Rapid or very slow heart rate    Loss of  bowel or bladder control  When to seek medical advice  Call your healthcare provider right away if any of the following occur:    Pain becomes worse or spreads to your arms or legs    Weakness or numbness in one or both arms or legs    Numbness in the groin area  Date Last Reviewed: 6/1/2016     9556-0846 [employer]. [address] [place] [address], [place] [address]. All rights reserved. This information is not intended as a substitute for professional medical care. Always follow your healthcare professional's instructions.      I would recommend taking Ibuprofen 600 mg every 6 hours for the next 4-5 days.  Heat to right lower back/buttock region as well as right shin  Do not drink any alcohol with the Norco (Hydrocodone) or Valium, these are both narcotics, do not drive if you take these.            24 Hour Appointment Hotline       To make an appointment at any [facility], call [phone] ([phone]). If you don't have a family doctor or clinic, we will help you find one. [facility] clinics are conveniently located to serve the needs of you and your family.             Review of your medicines      START taking        Dose / Directions Last dose taken    diazepam 5 MG tablet   Commonly known as:  VALIUM   Dose:  5-10 mg   Quantity:  20 tablet        Take 1-2 tablets (5-10 mg) by mouth every 6 hours as needed for anxiety or sleep   Refills:  0        HYDROcodone-acetaminophen 5-325 MG per tablet   Commonly known as:  NORCO   Dose:  1-2 tablet   Quantity:  18 tablet        Take 1-2 tablets by mouth every 4 hours as needed for pain   Refills:  0        predniSONE 10 MG tablet   Commonly known as:  DELTASONE   Quantity:  32 tablet        Take 4 tablets daily for 5 days,  take 2 tablets daily for 3 days, take 1 tablet daily for 3 days, take half a tablet for 3 days.   Refills:  0          Our records show that you are taking the medicines listed below. If these are incorrect, please call your family doctor or clinic.        Dose / Directions Last dose taken    ibuprofen 600 MG tablet   Commonly known as:  ADVIL/MOTRIN   Dose:  600 mg   Quantity:  30 tablet        Take 1 tablet (600 mg) by mouth every 6 hours as needed for moderate pain   Refills:  0        NAPROXEN PO   Dose:  500 mg         Take 500 mg by mouth   Refills:  0                Information about OPIOIDS     PRESCRIPTION OPIOIDS: WHAT YOU NEED TO KNOW   We gave you an opioid (narcotic) pain medicine. It is important to manage your pain, but opioids are not always the best choice. You should first try all the other options your care team gave you. Take this medicine for as short a time (and as few doses) as possible.    Some activities can increase your pain, such as bandage changes or therapy sessions. It may help to take your pain medicine 30 to 60 minutes before these activities. Reduce your stress by getting enough sleep, working on hobbies you enjoy and practicing relaxation or meditation. Talk to your care team about ways to manage your pain beyond prescription opioids.    These medicines have risks:    DO NOT drive when on new or higher doses of pain medicine. These medicines can affect your alertness and reaction times, and you could be arrested for driving under the influence (DUI). If you need to use opioids long-term, talk to your care team about driving.    DO NOT operate heavy machinery    DO NOT do any other dangerous activities while taking these medicines.    DO NOT drink any alcohol while taking these medicines.     If the opioid prescribed includes acetaminophen, DO NOT take with any other medicines that contain acetaminophen. Read all labels carefully. Look for the word  acetaminophen  or  Tylenol.  Ask your pharmacist if you have questions or are unsure.    You can get addicted to pain medicines, especially if you have a history of addiction (chemical, alcohol or substance dependence). Talk to your care team about ways to reduce this risk.    All opioids tend to cause constipation. Drink plenty of water and eat foods that have a lot of fiber, such as fruits, vegetables, prune juice, apple juice and high-fiber cereal. Take a laxative (Miralax, milk of magnesia, Colace, Senna) if you don t move your bowels at least every  other day. Other side effects include upset stomach, sleepiness, dizziness, throwing up, tolerance (needing more of the medicine to have the same effect), physical dependence and slowed breathing.    Store your pills in a secure place, locked if possible. We will not replace any lost or stolen medicine. If you don t finish your medicine, please throw away (dispose) as directed by your pharmacist. The [place] [employer] has more information about safe disposal: [email or web address]://[email or web address].[email or web address]/living-green/[email or web address]-unwanted-medications        Prescriptions were sent or printed at these locations (3 Prescriptions)                   [facility] - [place], [place] - [address]   [address], [facility] [address]    Telephone:  [phone]   Fax:  [phone]   Hours:                  E-Prescribed (1 of 3)         predniSONE (DELTASONE) 10 MG tablet                 Printed at Department/Unit printer (2 of 3)         diazepam (VALIUM) 5 MG tablet               HYDROcodone-acetaminophen (NORCO) 5-325 MG per tablet                Orders Needing Specimen Collection     None      Pending Results     No orders found from 8/25/2018 to 8/28/2018.            Pending Culture Results     No orders found from 8/25/2018 to 8/28/2018.            Pending Results Instructions     If you had any lab results that were not finalized at the time of your Discharge, you can call the ED Lab Result RN at [phone]. You will be contacted by this team for any positive Lab results or changes in treatment. The nurses are available 7 days a week from 10A to 6:30P.  You can leave a message 24 hours per day and they will return your call.        Thank you for choosing [facility]       Thank you for choosing [facility] for your care. Our goal is always to provide you with excellent care. Hearing back from our patients is one way we can continue to improve our services. Please take a few minutes to complete  "the written survey that you may receive in the mail after you visit with us. Thank you!        [facility] Information     [employer] lets you send messages to your doctor, view your test results, renew your prescriptions, schedule appointments and more. To sign up, go to [email or web address] . Click on \"Log in\" on the left side of the screen, which will take you to the Welcome page. Then click on \"Sign up Now\" on the right side of the page.     You will be asked to enter the access code listed below, as well as some personal information. Please follow the directions to create your username and password.     Your access code is: [email or web address]  Expires: 2018 10:49 PM     Your access code will  in 90 days. If you need help or a new code, please call your [place] clinic or [phone].        Care EveryWhere ID     This is your Care EveryWhere ID. This could be used by other organizations to access your [place] medical records  [id number]        Equal Access to Services     RYLEY WADSWORTH : Hadii devyn burriso Sojeannine, waaxda luqadaha, qaybta kaalmajenni adejosee, jud gutierrez . So [facility] [place] [phone].    ATENCIÓN: Si habla español, tiene a mcnamara disposición servicios gratuitos de asistencia lingüística. Llame al [phone].    We comply with applicable federal civil rights laws and [place] laws. We do not discriminate on the basis of race, color, national origin, age, disability, sex, sexual orientation, or gender identity.            After Visit Summary       This is your record. Keep this with you and show to your community pharmacist(s) and doctor(s) at your next visit.                  "

## 2018-08-28 NOTE — DISCHARGE INSTRUCTIONS
Understanding Lumbar Radiculopathy    Lumbar radiculopathy is irritation or inflammation of a nerve root in the low back. It causes symptoms that spread out from the back down one or both legs. To understand this condition, it helps to understand the parts of the spine:    Vertebrae. These are bones that stack to form the spine. The lumbar spine contains the 5 bottom vertebrae.    Disks. These are soft pads of tissue between the vertebrae. They act as shock absorbers for the spine.    Spinal canal. This is a tunnel formed within the stacked vertebrae. In the lumbar spine, nerves run through this canal.    Nerves. These branch off and leave the spinal canal, traveling out to parts of the body. As they leave the spinal canal, nerves pass through openings between the vertebrae. The nerve root is the part of the nerve that is closest to the spinal canal.    Sciatic nerve. This is a large nerve formed from several nerve roots in the low back. This nerve extends down the back of the leg to the foot.  With lumbar radiculopathy, nerve roots in the low back become irritated. This leads to pain and symptoms. The sciatic nerve is commonly involved, so the condition is often called sciatica.  What causes lumbar radiculopathy?  Aging, injury, poor posture, extra body weight, and other issues can lead to problems in the low back. These problems may then irritate nerve roots. They include:    Damage to a disk in the lumbar spine. The damaged disk may then press on nearby nerve roots.    Degeneration from wear and tear, and aging. This can lead to narrowing (stenosis) of the openings between the vertebrae. The narrowed openings press on nerve roots as they leave the spinal canal.    Unstable spine. This is when a vertebra slips forward. It can then press on a nerve root.  Other, less common things can put pressure on nerves in the low back. These include diabetes, infection, or a tumor.  Symptoms of lumbar radiculopathy  These  include:    Pain in the low back    Pain, numbness, tingling, or weakness that travels into the buttocks, hip, groin, or leg    Muscle spasms  Treatment for lumbar radiculopathy  In most cases, your healthcare provider will first try treatments that help relieve symptoms. These may include:    Prescription and over-the-counter pain medicines. These help relieve pain, swelling, and irritation.    Limits on positions and activities that increase pain. But lying in bed or avoiding all movement is only recommended for a short period of time.    Physical therapy, including exercises and stretches. This helps decrease pain and increase movement and function.    Steroid shots into the lower back. This may help relieve symptoms for a time.    Weight-loss program. If you are overweight, losing extra pounds may help relieve symptoms.  In some cases, you may need surgery to fix the underlying problem. This depends on the cause, the symptoms, and how long the pain has lasted.  Possible complications  Over time, an irritated and inflamed nerve may become damaged. This may lead to long-lasting (permanent) numbness or weakness in your legs and feet. If symptoms change suddenly or get worse, be sure to let your healthcare provider know.  When to call your healthcare provider  Call your healthcare provider right away if you have any of these:    New pain or pain that gets worse    New or increasing weakness, tingling, or numbness in your leg or foot    Problems controlling your bladder or bowel   Date Last Reviewed: 3/10/2016    8930-7067 [employer]. [address] [place] [address], [place] [address]. All rights reserved. This information is not intended as a substitute for professional medical care. Always follow your healthcare professional's instructions.    Back Care Tips    Caring for your back  These are things you can do to prevent a recurrence of acute back pain and to reduce symptoms from chronic back  pain:    Maintain a healthy weight. If you are overweight, losing weight will help most types of back pain.    Exercise is an important part of recovery from most types of back pain. The muscles behind and in front of the spine support the back. This means strengthening both the back muscles and the abdominal muscles will provide better support for your spine.     Swimming and brisk walking are good overall exercises to improve your fitness level.    Practice safe lifting methods (below).    Practice good posture when sitting, standing and walking. Avoid prolonged sitting. This puts more stress on the lower back than standing or walking.    Wear quality shoes with sufficient arch support. Foot and ankle alignment can affect back symptoms. Women should avoid wearing high heels.    Therapeutic massage can help relax the back muscles without stretching them.    During the first 24 to 72 hours after an acute injury or flare-up of chronic back pain, apply an ice pack to the painful area for 20 minutes and then remove it for 20 minutes, over a period of 60 to 90 minutes, or several times a day. As a safety precaution, do not use a heating pad at bedtime. Sleeping on a heating pad can lead to skin burns or tissue damage.    You can alternate ice and heat therapies.  Medicines  Talk to your healthcare provider before using medicines, especially if you have other medical problems or are taking other medicines.    You may use acetaminophen or ibuprofen to control pain, unless your healthcare provider prescribed other pain medicine. If you have chronic conditions like diabetes, liver or kidney disease, stomach ulcers, or gastrointestinal bleeding, or are taking blood thinners, talk with your healthcare provider before taking any medicines.    Be careful if you are given prescription pain medicines, narcotics, or medicine for muscle spasm. They can cause drowsiness, affect your coordination, reflexes, and judgment. Do not drive  or operate heavy machinery while taking these types of medicines. Take prescription pain medicine only as prescribed by your healthcare provider.  Lumbar stretch  Here is a simple stretching exercise that will help relax muscle spasm and keep your back more limber. If exercise makes your back pain worse, don t do it.    Lie on your back with your knees bent and both feet on the ground.    Slowly raise your left knee to your chest as you flatten your lower back against the floor. Hold for 5 seconds.    Relax and repeat the exercise with your right knee.    Do 10 of these exercises for each leg.  Safe lifting method    Don t bend over at the waist to lift an object off the floor.  Instead, bend your knees and hips in a squat.     Keep your back and head upright    Hold the object close to your body, directly in front of you.    Straighten your legs to lift the object.     Lower the object to the floor in the reverse fashion.    If you must slide something across the floor, push it.  Posture tips  Sitting  Sit in chairs with straight backs or low-back support. Keep your knees lower than your hips, with your feet flat on the floor.  When driving, sit up straight. Adjust the seat forward so you are not leaning toward the steering wheel.  A small pillow or rolled towel behind your lower back may help if you are driving long distances.   Standing  When standing for long periods, shift most of your weight to one leg at a time. Alternate legs every few minutes.   Sleeping  The best way to sleep is on your side with your knees bent. Put a low pillow under your head to support your neck in a neutral spine position. Avoid thick pillows that bend your neck to one side. Put a pillow between your legs to further relax your lower back. If you sleep on your back, put pillows under your knees to support your legs in a slightly flexed position. Use a firm mattress. If your mattress sags, replace it, or use a 1/2-inch plywood board  under the mattress to add support.  Follow-up care  Follow up with your healthcare provider, or as advised.  If X-rays, a CT scan or an MRI scan were taken, they will be reviewed by a radiologist. You will be notified of any new findings that may affect your care.  Call 911  Call 911 if any of the following occur:    Trouble breathing    Confusion    Very drowsy    Fainting or loss of consciousness    Rapid or very slow heart rate    Loss of  bowel or bladder control  When to seek medical advice  Call your healthcare provider right away if any of the following occur:    Pain becomes worse or spreads to your arms or legs    Weakness or numbness in one or both arms or legs    Numbness in the groin area  Date Last Reviewed: 6/1/2016 2000-2017 [employer]. [address] [place] [address], [place] [address]. All rights reserved. This information is not intended as a substitute for professional medical care. Always follow your healthcare professional's instructions.      I would recommend taking Ibuprofen 600 mg every 6 hours for the next 4-5 days.  Heat to right lower back/buttock region as well as right shin  Do not drink any alcohol with the Norco (Hydrocodone) or Valium, these are both narcotics, do not drive if you take these.

## 2018-08-28 NOTE — ED PROVIDER NOTES
"  History     Chief Complaint   Patient presents with     Leg Pain     HPI  Alejandro Alves is a 52 year old male who presents to the ED today with his wife with progressive back pain.  Patient reports that he was working on his truck's engine when he tried to pull on something heavy and felt pain in right lower back/buttock region.  Patient then worked on a house he has been finishing and then put in a basket ball hoop which both exacerbated his symptoms.  Wife reports patient has been lying on the floor at home moaning in pain since Thursday. Pain is unrelieved with Tylenol, Ibuprofen, and Naproxen.  Patient had some \"left over pain medication\" at home he also tried without any relief.     Problem List:    Patient Active Problem List    Diagnosis Date Noted     Subacromial impingement, right 02/09/2017     Priority: Medium     Superior glenoid labrum lesion of right shoulder, initial encounter 02/09/2017     Priority: Medium     Incomplete tear of right rotator cuff 02/09/2017     Priority: Medium     Restless legs syndrome (RLS) 04/20/2016     Priority: Medium     Hip pain, right 04/20/2016     Priority: Medium     Tobacco use disorder 04/11/2016     Priority: Medium        Past Medical History:    History reviewed. No pertinent past medical history.    Past Surgical History:    Past Surgical History:   Procedure Laterality Date     COLONOSCOPY N/A 6/6/2016    Procedure: COMBINED COLONOSCOPY, SINGLE OR MULTIPLE BIOPSY/POLYPECTOMY BY BIOPSY;  Surgeon: Theron Vigil MD;  Location:  GI       Family History:    No family history on file.    Social History:  Marital Status:  Single [1]  Social History   Substance Use Topics     Smoking status: Current Every Day Smoker     Packs/day: 1.00     Smokeless tobacco: Never Used      Comment: patient states he would like to      Alcohol use No        Medications:      ibuprofen (ADVIL/MOTRIN) 600 MG tablet   NAPROXEN PO         Review of Systems " "  Musculoskeletal: Positive for arthralgias, back pain and myalgias.   All other systems reviewed and are negative.      Physical Exam   BP: 133/85  Heart Rate: 85  Temp: 98.7  F (37.1  C)  Resp: 20  Height: 175.3 cm (5' 9\")  Weight: 73.5 kg (162 lb)  SpO2: 97 %      Physical Exam   Constitutional: He is oriented to person, place, and time. He appears well-developed and well-nourished. He appears distressed (Secondary to pain).   HENT:   Head: Normocephalic.   Eyes: Conjunctivae are normal.   Neck: Normal range of motion.   Cardiovascular: Normal rate, regular rhythm and normal heart sounds.    Pulmonary/Chest: Effort normal and breath sounds normal. No respiratory distress.   Musculoskeletal: He exhibits tenderness (right upper buttock/lower back region, right shin, worse with flexion, strength is 5/5.  distal pulses intact).   Neurological: He is alert and oriented to person, place, and time. He has normal reflexes. No cranial nerve deficit.   Skin: Skin is warm and dry. No rash noted. He is not diaphoretic. No erythema.   Psychiatric: He has a normal mood and affect.       ED Course     ED Course     Procedures    No results found for this or any previous visit (from the past 24 hour(s)).    Medications   Lidocaine (LIDOCARE) 4 % Patch 2 patch (2 patches Transdermal Given 8/27/18 2010)   diazepam (VALIUM) tablet 10 mg (10 mg Oral Given 8/27/18 2009)   HYDROmorphone (DILAUDID) injection 1 mg (1 mg Intramuscular Given 8/27/18 2014)   oxyCODONE IR (ROXICODONE) tablet 10 mg (10 mg Oral Given 8/27/18 2009)   predniSONE (DELTASONE) tablet 40 mg (40 mg Oral Given 8/27/18 2009)   fentaNYL (PF) (SUBLIMAZE) injection 50 mcg (50 mcg Intramuscular Given 8/27/18 2204)       Assessments & Plan (with Medical Decision Making)  Alejandro is a 52-year-old male, otherwise healthy who presents to the emergency department today with progressive right buttock lower back pain as well as pain in his right shin.  Please refer to HPI " and focused exam.  Patient's exam is consistent with an acute sciatica, he was given stretching exercises from the clinic and has a tender right shin, there is no rash or erythema here and it is likely with the flexion and extension of his right foot that he has irritated his shin muscle as foot flexion exacerbates his symptoms. Patient has no unilateral leg swelling or calf pain to indicate possible DVT.  Patient is neurovascularly intact, his strength is 5 out of 5.  Patient was given several medications here to help with his pain including oral Valium, shot of IM Dilaudid, lidocaine patches and a dose of prednisone.  Patient continues to complain of his pain at 8 out of 10 although he appears more comfortable, he was given an IM injection of fentanyl and dose of oral oxycodone and is feeling much better, pain is currently a 5 out of 10.  Patient has remained alert and oriented, his oxygen saturation has been stable.  Patient has no unilateral weakness or loss of bowel or bladder function to be worrisome for cauda equina.  Given his significant inflammation/muscle spasms I am going to send him home on a tapering course of prednisone.  Patient was given a prescription for Valium as well as Norco and narcotic/opioid safety instructions were discussed in detail including not driving or drinking alcohol.  I did discuss with patient and his wife that he needs to take scheduled ibuprofen for the next 4-5 days, 600 mg every 6 hours.  Heat was recommended to his right buttock/right shin region.  I would like patient to follow-up in clinic for reevaluation at the end of the week, reasons to return to the emergency department were discussed in detail.  Patient is wife are agreeable to plan of care and patient was discharged in stable condition with his wife driving.     I have reviewed the nursing notes.    I have reviewed the findings, diagnosis, plan and need for follow up with the patient.    New Prescriptions     DIAZEPAM (VALIUM) 5 MG TABLET    Take 1-2 tablets (5-10 mg) by mouth every 6 hours as needed for anxiety or sleep    HYDROCODONE-ACETAMINOPHEN (NORCO) 5-325 MG PER TABLET    Take 1-2 tablets by mouth every 4 hours as needed for pain    PREDNISONE (DELTASONE) 10 MG TABLET    Take 4 tablets daily for 5 days,  take 2 tablets daily for 3 days, take 1 tablet daily for 3 days, take half a tablet for 3 days.       Final diagnoses:   Acute sciatica - Right   Muscular pain - Right Shin       8/27/2018   [facility] EMERGENCY DEPARTMENT     Sepideh Finney, JOSELINE CNP  08/27/18 5942

## 2018-11-09 ENCOUNTER — OFFICE VISIT (OUTPATIENT)
Dept: FAMILY MEDICINE | Facility: CLINIC | Age: 53
End: 2018-11-09
Payer: COMMERCIAL

## 2018-11-09 VITALS
HEART RATE: 101 BPM | WEIGHT: 168.8 LBS | SYSTOLIC BLOOD PRESSURE: 120 MMHG | DIASTOLIC BLOOD PRESSURE: 68 MMHG | BODY MASS INDEX: 24.93 KG/M2 | TEMPERATURE: 97.4 F | OXYGEN SATURATION: 96 %

## 2018-11-09 DIAGNOSIS — R21 RASH AND NONSPECIFIC SKIN ERUPTION: ICD-10-CM

## 2018-11-09 DIAGNOSIS — G25.81 RESTLESS LEGS SYNDROME (RLS): ICD-10-CM

## 2018-11-09 DIAGNOSIS — K40.90 RIGHT INGUINAL HERNIA: Primary | ICD-10-CM

## 2018-11-09 PROCEDURE — 99214 OFFICE O/P EST MOD 30 MIN: CPT | Performed by: FAMILY MEDICINE

## 2018-11-09 RX ORDER — ROPINIROLE 0.25 MG/1
TABLET, FILM COATED ORAL
Qty: 189 TABLET | Refills: 3 | Status: SHIPPED | OUTPATIENT
Start: 2018-11-09 | End: 2019-05-23

## 2018-11-09 ASSESSMENT — PAIN SCALES - GENERAL: PAINLEVEL: NO PAIN (0)

## 2018-11-09 NOTE — NURSING NOTE
Health Maintenance Due   Topic Date Due     TETANUS IMMUNIZATION (SYSTEM ASSIGNED)  10/19/1982     HIV SCREEN (SYSTEM ASSIGNED)  10/19/1982     TOBACCO CESSATION COUNSELING Q1 YR  04/20/2017     PHQ-2 Q1 YR  01/11/2018     INFLUENZA VACCINE (1) 09/01/2018       Health Maintenance reviewed at today's visit patient asked to schedule/complete:   Patient is aware.    Donya Bowers, CMA

## 2018-11-09 NOTE — PROGRESS NOTES
"  SUBJECTIVE:   Alejandro Alves is a 53 year old male who presents to clinic today for the following health issues:      Chief Complaint   Patient presents with     Mass     in groin couple weeks no pain, right side of groin     Recheck Medication     requip     Musculoskeletal Problem     both feeth toes \"look beat up\"             Problem list and histories reviewed & adjusted, as indicated.  Additional history: as documented        Reviewed and updated as needed this visit by clinical staff  Tobacco  Allergies  Meds       Reviewed and updated as needed this visit by Provider        SUBJECTIVE:  Alejandro  is a 53 year old male who presents for: Several issues today.  Main issue is scheduled for was a lump in his right groin.  This is become more noticeable.  Does notice is not there in the morning.  No dysuria.  No previous surgeries in this area.  Second issue he brings up is his Requip for restless leg syndrome.  Apparently he had been taking this 3 times a day.  And is got some concern about some redness on his left foot.  He is a rambling historian.    No past medical history on file.  Past Surgical History:   Procedure Laterality Date     COLONOSCOPY N/A 6/6/2016    Procedure: COMBINED COLONOSCOPY, SINGLE OR MULTIPLE BIOPSY/POLYPECTOMY BY BIOPSY;  Surgeon: Theron Vigil MD;  Location:  GI     Social History   Substance Use Topics     Smoking status: Current Every Day Smoker     Packs/day: 1.00     Smokeless tobacco: Never Used      Comment: patient states he would like to      Alcohol use No     Current Outpatient Prescriptions   Medication Sig Dispense Refill     diazepam (VALIUM) 5 MG tablet Take 1-2 tablets (5-10 mg) by mouth every 6 hours as needed for anxiety or sleep 20 tablet 0     HYDROcodone-acetaminophen (NORCO) 5-325 MG per tablet Take 1-2 tablets by mouth every 4 hours as needed for pain 18 tablet 0     ibuprofen (ADVIL/MOTRIN) 600 MG tablet Take 1 tablet (600 mg) by mouth " every 6 hours as needed for moderate pain 30 tablet 0     NAPROXEN PO Take 500 mg by mouth       rOPINIRole (REQUIP) 0.25 MG tablet Take 1 tab three times daily for 1 week, then 2 tabs three times daily for 1 week, then 3 tabs three times daily 189 tablet 3       REVIEW OF SYSTEMS:   5 point ROS negative except as noted above in HPI, including Gen., Resp, CV, GI &  system review.     OBJECTIVE:  Vitals: /68 (BP Location: Left arm, Patient Position: Chair, Cuff Size: Adult Regular)  Pulse 101  Temp 97.4  F (36.3  C) (Temporal)  Wt 168 lb 12.8 oz (76.6 kg)  SpO2 96%  BMI 24.93 kg/m2  BMI= Body mass index is 24.93 kg/(m^2).  He is alert appears in no distress.  Continuously talking and changing subjects.  His abdomen is soft bowel sounds present.  He has a reducible right inguinal hernia not seem to protrude into the scrotum.  His foot shows some scaling and redness laterally and a few scabs.    ASSESSMENT:  #1 right inguinal hernia #2 restless leg syndrome #3 rash on foot    PLAN:  General surgery consult.  Told to take it easy on the weekend here.  Warned of signs of becoming nonreducible or incarcerated.  He will be built up on his Requip according to directions.  Suggested use of over-the-counter triple antibiotic ointment for his foot.        Luther Nevarez MD  [facility]

## 2018-11-09 NOTE — MR AVS SNAPSHOT
After Visit Summary   11/9/2018    Alejandro Alves    MRN: [id number]           Patient Information     Date Of Birth          1965        Visit Information        Provider Department      11/9/2018 11:40 AM Luther Nevarez MD [facility]        Today's Diagnoses     Right inguinal hernia    -  1    Restless legs syndrome (RLS)           Follow-ups after your visit        Additional Services     GENERAL SURG ADULT REFERRAL       Your provider has referred you to: FMG: [facility] [phone]   [email or web address]    Please be aware that coverage of these services is subject to the terms and limitations of your health insurance plan.  Call member services at your health plan with any benefit or coverage questions.      Please bring the following to your appointment:  >>   Any x-rays, CTs or MRIs which have been performed.  Contact the facility where they were done to arrange for  prior to your scheduled appointment.  Any new CT, MRI or other procedures ordered by your specialist must be performed at a [facility] facility or coordinated by your clinic's referral office.    >>   List of current medications   >>   This referral request   >>   Any documents/labs given to you for this referral                  Your next 10 appointments already scheduled     Nov 13, 2018 11:00 AM CST   New Visit with Mercedes Suazo MD   [facility] ([facility]    [address] [place] [id number] [phone]              Who to contact     If you have questions or need follow up information about today's clinic visit or your schedule please contact [facility] directly at [phone].  Normal or non-critical lab and imaging results will be communicated to you by MyChart, letter or phone within 4 business days after the clinic has received the results. If you do not hear from us  "within 7 days, please contact the clinic through [employer] or phone. If you have a critical or abnormal lab result, we will notify you by phone as soon as possible.  Submit refill requests through [employer] or call your pharmacy and they will forward the refill request to us. Please allow 3 business days for your refill to be completed.          Additional Information About Your Visit        [employer] Software Information     [employer] lets you send messages to your doctor, view your test results, renew your prescriptions, schedule appointments and more. To sign up, go to [email or web address] . Click on \"Log in\" on the left side of the screen, which will take you to the Welcome page. Then click on \"Sign up Now\" on the right side of the page.     You will be asked to enter the access code listed below, as well as some personal information. Please follow the directions to create your username and password.     Your access code is: [email or web address]  Expires: 2018  9:49 PM     Your access code will  in 90 days. If you need help or a new code, please call your [facility] clinic or [phone].        Care EveryWhere ID     This is your Care EveryWhere ID. This could be used by other organizations to access your [facility] medical records  [id number]        Your Vitals Were     Pulse Temperature Pulse Oximetry BMI (Body Mass Index)          101 97.4  F (36.3  C) (Temporal) 96% 24.93 kg/m2         Blood Pressure from Last 3 Encounters:   18 120/68   18 103/78   17 116/60    Weight from Last 3 Encounters:   18 168 lb 12.8 oz (76.6 kg)   18 162 lb (73.5 kg)   17 160 lb (72.6 kg)              We Performed the Following     GENERAL SURG ADULT REFERRAL          Today's Medication Changes          These changes are accurate as of 18 12:25 PM.  If you have any questions, ask your nurse or doctor.               Start taking these medicines.        Dose/Directions    rOPINIRole 0.25 MG tablet "   Commonly known as:  REQUIP   Used for:  Restless legs syndrome (RLS)   Started by:  Luther Nevarez MD        Take 1 tab three times daily for 1 week, then 2 tabs three times daily for 1 week, then 3 tabs three times daily   Quantity:  189 tablet   Refills:  3            Where to get your medicines      These medications were sent to [facility] [address] [place] [address] - [address]  [address], [facility] [address]     Phone:  [phone]     rOPINIRole 0.25 MG tablet                Primary Care Provider Office Phone # Fax #    Janelle Ramachandran, APRN [phone] [address] [place] [address]        Equal Access to Services     [facility]: Hadii devyn ku hadasho Soomaali, waaxda luqadaha, qaybta kaalmada adeegyada, waxay idiin hayjosé gutierrez . So [facility] [phone].    ATENCIÓN: Si habla español, tiene a mcnamara disposición servicios gratuitos de asistencia lingüística. [facility] [phone].    We comply with applicable federal civil rights laws and [place] laws. We do not discriminate on the basis of race, color, national origin, age, disability, sex, sexual orientation, or gender identity.            Thank you!     Thank you for choosing [facility]  for your care. Our goal is always to provide you with excellent care. Hearing back from our patients is one way we can continue to improve our services. Please take a few minutes to complete the written survey that you may receive in the mail after your visit with us. Thank you!             Your Updated Medication List - Protect others around you: Learn how to safely use, store and throw away your medicines at www.disposemymeds.org.          This list is accurate as of 11/9/18 12:25 PM.  Always use your most recent med list.                   Brand Name Dispense Instructions for use Diagnosis    diazepam 5 MG tablet    VALIUM    20 tablet    Take 1-2 tablets (5-10 mg) by mouth every 6  hours as needed for anxiety or sleep        HYDROcodone-acetaminophen 5-325 MG per tablet    NORCO    18 tablet    Take 1-2 tablets by mouth every 4 hours as needed for pain        ibuprofen 600 MG tablet    ADVIL/MOTRIN    30 tablet    Take 1 tablet (600 mg) by mouth every 6 hours as needed for moderate pain        NAPROXEN PO      Take 500 mg by mouth        rOPINIRole 0.25 MG tablet    REQUIP    189 tablet    Take 1 tab three times daily for 1 week, then 2 tabs three times daily for 1 week, then 3 tabs three times daily    Restless legs syndrome (RLS)

## 2018-11-16 ENCOUNTER — OFFICE VISIT (OUTPATIENT)
Dept: SURGERY | Facility: CLINIC | Age: 53
End: 2018-11-16
Payer: COMMERCIAL

## 2018-11-16 VITALS
DIASTOLIC BLOOD PRESSURE: 64 MMHG | HEIGHT: 69 IN | WEIGHT: 167.5 LBS | HEART RATE: 99 BPM | BODY MASS INDEX: 24.81 KG/M2 | SYSTOLIC BLOOD PRESSURE: 106 MMHG | OXYGEN SATURATION: 96 % | TEMPERATURE: 98.2 F

## 2018-11-16 DIAGNOSIS — K40.90 REDUCIBLE RIGHT INGUINAL HERNIA: Primary | ICD-10-CM

## 2018-11-16 DIAGNOSIS — F17.200 TOBACCO USE DISORDER: ICD-10-CM

## 2018-11-16 PROCEDURE — 99214 OFFICE O/P EST MOD 30 MIN: CPT | Performed by: SURGERY

## 2018-11-16 ASSESSMENT — PAIN SCALES - GENERAL: PAINLEVEL: NO PAIN (0)

## 2018-11-16 NOTE — MR AVS SNAPSHOT
"              After Visit Summary   11/16/2018    Alejandro Alves    MRN: [id number]           Patient Information     Date Of Birth          1965        Visit Information        Provider Department      11/16/2018 4:30 PM Mercedes Suazo MD [facility]        Today's Diagnoses     Reducible right inguinal hernia    -  1    Tobacco use disorder           Follow-ups after your visit        Who to contact     If you have questions or need follow up information about today's clinic visit or your schedule please contact [facility] directly at [phone].  Normal or non-critical lab and imaging results will be communicated to you by MyChart, letter or phone within 4 business days after the clinic has received the results. If you do not hear from us within 7 days, please contact the clinic through MyChart or phone. If you have a critical or abnormal lab result, we will notify you by phone as soon as possible.  Submit refill requests through [employer] or call your pharmacy and they will forward the refill request to us. Please allow 3 business days for your refill to be completed.          Additional Information About Your Visit        Care EveryWhere ID     This is your Care EveryWhere ID. This could be used by other organizations to access your [facility] medical records  [id number]        Your Vitals Were     Pulse Temperature Height Pulse Oximetry BMI (Body Mass Index)       99 98.2  F (36.8  C) (Temporal) 1.74 m (5' 8.5\") 96% 25.1 kg/m2        Blood Pressure from Last 3 Encounters:   11/16/18 106/64   11/09/18 120/68   08/27/18 103/78    Weight from Last 3 Encounters:   11/16/18 76 kg (167 lb 8 oz)   11/09/18 76.6 kg (168 lb 12.8 oz)   08/27/18 73.5 kg (162 lb)              We Performed the Following     Catherine-Operative Worksheet        Primary Care Provider Office Phone # Fax #    JOSELINE Snyder [phone] [address] [place] " [place] [address]        Equal Access to Services     [facility] ANANTH : Hadii devyn sigala adonis Cantu, waaxda luqadaha, qaybta kaalmada oliviasaijenni, waxmatthew zoie mclaughlinjaleesalinwood lsade. So [facility] [phone].    ATENCIÓN: Si habla español, tiene a mcnamara disposición servicios gratuitos de asistencia lingüística. Llame al [phone].    We comply with applicable federal civil rights laws and [place] laws. We do not discriminate on the basis of race, color, national origin, age, disability, sex, sexual orientation, or gender identity.            Thank you!     Thank you for choosing [facility]  for your care. Our goal is always to provide you with excellent care. Hearing back from our patients is one way we can continue to improve our services. Please take a few minutes to complete the written survey that you may receive in the mail after your visit with us. Thank you!             Your Updated Medication List - Protect others around you: Learn how to safely use, store and throw away your medicines at www.disposemymeds.org.          This list is accurate as of 11/16/18 11:59 PM.  Always use your most recent med list.                   Brand Name Dispense Instructions for use Diagnosis    aspirin-acetaminophen-caffeine 250-250-65 MG per tablet    EXCEDRIN MIGRAINE     Take 1 tablet by mouth every 6 hours as needed for headaches        diazepam 5 MG tablet    VALIUM    20 tablet    Take 1-2 tablets (5-10 mg) by mouth every 6 hours as needed for anxiety or sleep        ibuprofen 600 MG tablet    ADVIL/MOTRIN    30 tablet    Take 1 tablet (600 mg) by mouth every 6 hours as needed for moderate pain        NAPROXEN PO      Take 500 mg by mouth        rOPINIRole 0.25 MG tablet    REQUIP    189 tablet    Take 1 tab three times daily for 1 week, then 2 tabs three times daily for 1 week, then 3 tabs three times daily    Restless legs syndrome (RLS)

## 2018-11-16 NOTE — LETTER
"    11/16/2018         RE: Alejandro Alves  [address] [facility] [address]        Dear Colleague,    Thank you for referring your patient, Alejandro Alves, to the [facility]. Please see a copy of my visit note below.    /64 (BP Location: Right arm, Patient Position: Sitting, Cuff Size: Adult Large)  Pulse 99  Temp 98.2  F (36.8  C) (Temporal)  Ht 1.74 m (5' 8.5\")  Wt 76 kg (167 lb 8 oz)  SpO2 96%  BMI 25.1 kg/m2  Body mass index is 25.1 kg/(m^2).  5' 8.5\"  167 lbs 8 oz        Frances Mei MA on 11/16/2018 at 4:40 PM      General Surgery Consultation    Alejandro Alves MRN# [id number]   Age: 53 year old YOB: 1965     Reason for consult: Inguinal hernia                        Assessment and Plan:   I was asked to see this patient at the request of Dr. Nevarez for evaluation of right inguinal hernia.  Alejandro Alves is a 53 year old male who presented with history and exam most consistent with right inguinal hernia.       ICD-10-CM    1. Reducible right inguinal hernia K40.90 Catherine-Operative Worksheet   2. Tobacco use disorder F17.200        The patient was thoroughly counseled regarding Reducible right inguinal hernia [K40.90].     The patient was informed that the proposed procedure or medical intervention involves reduction and repair with or without mesh and does offer a very good likelihood of symptom relief. We also discussed the options of repairing the hernia laparoscopically versus open. Patient opted for laparoscopic repair.      The patient was made aware of the risks of the procedure, including but not limited to:  nerve entrapment or injury, persistence of pain (10%), injury to the bowel/bladder, infertility, ischemic orchitis (rare), Injury to the vas deferens (rare), hematoma, mesh migration, mesh infection, cardiac or pulmonary complication and anesthesia related complications also that difficulties may be encountered " during recovery to include: wound infection, recurrence (5-10%), seroma, hematoma and chronic pain.     In the course of the evaluation we did discuss other therapeutic options with the patient, including continued watchful waiting. The risks and benefits of these options were also discussed which include but are not limited to: incarceration and/or strangulation..     Also discussed were possible problems or difficulties the patient may encounter if treatment was not pursued at this time.     The patient was informed that [facility] will be primarily responsible for the procedure. Assistance during the procedure and during hospitalization may also be provided by other physicians, nurses and technicians.     The patient was also informed that if exposure to the patient s blood or body fluids occurs during the procedure, HIV testing of the patient will occur unless they refuse at this time. Risk of blood transfusion is minimal.     The patient will be provided additional education resources by the support staff. If there are ever any questions regarding their diagnosis or the procedure, the patient is encouraged to ask.     All of the patient s or their legal representative s questions have been answered to their satisfaction and they have indicated a clear understanding of this discussion.   Alejandro expressed understanding of risks, benefits and alternatives and wished to proceed.     All findings, test results, and diagnosis were discussed with the patient. Alejandro  participated in the decision making process and agreed with the plan of care. Questions were sought and answered.       Will have surgery scheduled. Encouraged patient to quit smoking and discussed effects of smoking on wound healing and hernia recurrence.        I thank Dr. Nevarez for the opportunity to participate in the patient's care.           Chief Complaint:   Right inguinal hernia     History is obtained from the patient          History of Present Illness:   This patient is a 53 year old  male with a significant past medical history of tobacco use who presents with a lump in his right groin. He first noticed it a few weeks ago when getting ready. Thinks it has gotten larger. It is somewhat painful. He thinks it gets smaller when he lays down but he has not tried to push on it. No history of abdominal surgeries. No GI or urinary complaints.  No inciting incidents.  No signs of obstruction.  He does smoke cigarettes.           Past Medical History:    has no past medical history on file.          Past Surgical History:     Past Surgical History:   Procedure Laterality Date     COLONOSCOPY N/A 6/6/2016    Procedure: COMBINED COLONOSCOPY, SINGLE OR MULTIPLE BIOPSY/POLYPECTOMY BY BIOPSY;  Surgeon: Theron Vigil MD;  Location:  GI           Medications:     Current Outpatient Prescriptions on File Prior to Visit:  diazepam (VALIUM) 5 MG tablet Take 1-2 tablets (5-10 mg) by mouth every 6 hours as needed for anxiety or sleep   ibuprofen (ADVIL/MOTRIN) 600 MG tablet Take 1 tablet (600 mg) by mouth every 6 hours as needed for moderate pain   NAPROXEN PO Take 500 mg by mouth   rOPINIRole (REQUIP) 0.25 MG tablet Take 1 tab three times daily for 1 week, then 2 tabs three times daily for 1 week, then 3 tabs three times daily     No current facility-administered medications on file prior to visit.       Allergies:    No Known Allergies         Social History:   Alejandro Alves  reports that he has been smoking.  He has been smoking about 1.00 pack per day. He has never used smokeless tobacco. He reports that he uses illicit drugs. He reports that he does not drink alcohol.          Family History:   The patient has no family history of any bleeding, clotting or anesthesia problems.          Review of Systems:     Constitutional: Denies fever or chills   Eyes: Denies change in visual acuity   HENT: Denies nasal congestion or  sore throat   Respiratory: Denies cough or shortness of breath   Cardiovascular: Denies chest pain or edema   GI: Denies abdominal pain, nausea, vomiting, bloody stools or diarrhea   : Denies dysuria   Musculoskeletal: Denies back pain or joint pain   Integument: Denies rash   Neurologic: Denies headache, focal weakness or sensory changes   Endocrine: Denies polyuria or polydipsia   Lymphatic: Denies swollen glands   Psychiatric: Denies depression or anxiety          Physical Exam:     Constitutional: Awake, alert, no acute distress.  Eyes:  No scleral icterus.  Conjunctiva are without injection.  ENMT: Mucous membranes moist, dentition and gums are intact.   Neck: Soft, supple, trachea midline.    Endocrine: The thyroid is without masses and mobile with swallow.   Lymphatic: There is no cervical, submandibular, or inguinal adenopathy.  Respiratory: Lungs are clear to auscultation and percussion bilaterally.   Cardiovascular: Regular rate and rhythm. No murmurs, rubs, or gallops.    Abdomen: Non-distended, non-tender, No masses. Right direct inguinal hernia present, reducible, no changes in color. No hernia on left.  Musculoskeletal: No spinal or CVA tenderness. Full range of motion in the upper and lower extremities.    Skin: No skin rashes or lesions to inspection.  No petechia.    Neurologic: Cranial nerves II through XII are grossly intact and symmetric.  Psychiatric: The patient is alert and oriented times 3.  The patient's affect is not blunted and mood is appropriate.          Data:   WBC -   WBC   Date Value Ref Range Status   04/15/2015 10.8 4.0 - 11.0 10e9/L Final   ], HgB -   Hemoglobin   Date Value Ref Range Status   04/20/2016 15.7 13.3 - 17.7 g/dL Final   ]   Liver Function Studies -   Recent Labs   Lab Test  04/20/16   1010   PROTTOTAL  7.0   ALBUMIN  4.0   BILITOTAL  0.4   ALKPHOS  67   AST  22   ALT  56     No results found for this or any previous visit (from the past 744 hour(s)).     [facility]  DO [place] 11/16/2018 5:10 PM           Again, thank you for allowing me to participate in the care of your patient.        Sincerely,        [facility] MD [place]

## 2018-11-16 NOTE — PROGRESS NOTES
"/64 (BP Location: Right arm, Patient Position: Sitting, Cuff Size: Adult Large)  Pulse 99  Temp 98.2  F (36.8  C) (Temporal)  Ht 1.74 m (5' 8.5\")  Wt 76 kg (167 lb 8 oz)  SpO2 96%  BMI 25.1 kg/m2  Body mass index is 25.1 kg/(m^2).  5' 8.5\"  167 lbs 8 oz        Frances Mei MA on 11/16/2018 at 4:40 PM    "

## 2018-11-16 NOTE — PROGRESS NOTES
General Surgery Consultation    Alejandro Alves MRN# [id number]   Age: 53 year old YOB: 1965     Reason for consult: Inguinal hernia                        Assessment and Plan:   I was asked to see this patient at the request of Dr. Nevarez for evaluation of right inguinal hernia.  Alejandro Alves is a 53 year old male who presented with history and exam most consistent with right inguinal hernia.       ICD-10-CM    1. Reducible right inguinal hernia K40.90 [facility]-Operative Worksheet   2. Tobacco use disorder F17.200        The patient was thoroughly counseled regarding Reducible right inguinal hernia [K40.90].     The patient was informed that the proposed procedure or medical intervention involves reduction and repair with or without mesh and does offer a very good likelihood of symptom relief. We also discussed the options of repairing the hernia laparoscopically versus open. Patient opted for laparoscopic repair.      The patient was made aware of the risks of the procedure, including but not limited to:  nerve entrapment or injury, persistence of pain (10%), injury to the bowel/bladder, infertility, ischemic orchitis (rare), Injury to the vas deferens (rare), hematoma, mesh migration, mesh infection, cardiac or pulmonary complication and anesthesia related complications also that difficulties may be encountered during recovery to include: wound infection, recurrence (5-10%), seroma, hematoma and chronic pain.     In the course of the evaluation we did discuss other therapeutic options with the patient, including continued watchful waiting. The risks and benefits of these options were also discussed which include but are not limited to: incarceration and/or strangulation..     Also discussed were possible problems or difficulties the patient may encounter if treatment was not pursued at this time.     The patient was informed that AnthonyPrince Suazo MD will be primarily responsible for the  procedure. Assistance during the procedure and during hospitalization may also be provided by other physicians, nurses and technicians.     The patient was also informed that if exposure to the patient s blood or body fluids occurs during the procedure, HIV testing of the patient will occur unless they refuse at this time. Risk of blood transfusion is minimal.     The patient will be provided additional education resources by the support staff. If there are ever any questions regarding their diagnosis or the procedure, the patient is encouraged to ask.     All of the patient s or their legal representative s questions have been answered to their satisfaction and they have indicated a clear understanding of this discussion.   Alejandro expressed understanding of risks, benefits and alternatives and wished to proceed.     All findings, test results, and diagnosis were discussed with the patient. Alejandro  participated in the decision making process and agreed with the plan of care. Questions were sought and answered.       Will have surgery scheduled. Encouraged patient to quit smoking and discussed effects of smoking on wound healing and hernia recurrence.        I thank Dr. Nevarez for the opportunity to participate in the patient's care.           Chief Complaint:   Right inguinal hernia     History is obtained from the patient         History of Present Illness:   This patient is a 53 year old  male with a significant past medical history of tobacco use who presents with a lump in his right groin. He first noticed it a few weeks ago when getting ready. Thinks it has gotten larger. It is somewhat painful. He thinks it gets smaller when he lays down but he has not tried to push on it. No history of abdominal surgeries. No GI or urinary complaints.  No inciting incidents.  No signs of obstruction.  He does smoke cigarettes.           Past Medical History:    has no past medical history on file.           Past Surgical History:     Past Surgical History:   Procedure Laterality Date     COLONOSCOPY N/A 6/6/2016    Procedure: COMBINED COLONOSCOPY, SINGLE OR MULTIPLE BIOPSY/POLYPECTOMY BY BIOPSY;  Surgeon: Theron Vigil MD;  Location:  GI           Medications:     Current Outpatient Prescriptions on File Prior to Visit:  diazepam (VALIUM) 5 MG tablet Take 1-2 tablets (5-10 mg) by mouth every 6 hours as needed for anxiety or sleep   ibuprofen (ADVIL/MOTRIN) 600 MG tablet Take 1 tablet (600 mg) by mouth every 6 hours as needed for moderate pain   NAPROXEN PO Take 500 mg by mouth   rOPINIRole (REQUIP) 0.25 MG tablet Take 1 tab three times daily for 1 week, then 2 tabs three times daily for 1 week, then 3 tabs three times daily     No current facility-administered medications on file prior to visit.       Allergies:    No Known Allergies         Social History:   Alejandro Alves  reports that he has been smoking.  He has been smoking about 1.00 pack per day. He has never used smokeless tobacco. He reports that he uses illicit drugs. He reports that he does not drink alcohol.          Family History:   The patient has no family history of any bleeding, clotting or anesthesia problems.          Review of Systems:     Constitutional: Denies fever or chills   Eyes: Denies change in visual acuity   HENT: Denies nasal congestion or sore throat   Respiratory: Denies cough or shortness of breath   Cardiovascular: Denies chest pain or edema   GI: Denies abdominal pain, nausea, vomiting, bloody stools or diarrhea   : Denies dysuria   Musculoskeletal: Denies back pain or joint pain   Integument: Denies rash   Neurologic: Denies headache, focal weakness or sensory changes   Endocrine: Denies polyuria or polydipsia   Lymphatic: Denies swollen glands   Psychiatric: Denies depression or anxiety          Physical Exam:     Constitutional: Awake, alert, no acute distress.  Eyes:  No scleral icterus.  Conjunctiva are  without injection.  ENMT: Mucous membranes moist, dentition and gums are intact.   Neck: Soft, supple, trachea midline.    Endocrine: The thyroid is without masses and mobile with swallow.   Lymphatic: There is no cervical, submandibular, or inguinal adenopathy.  Respiratory: Lungs are clear to auscultation and percussion bilaterally.   Cardiovascular: Regular rate and rhythm. No murmurs, rubs, or gallops.    Abdomen: Non-distended, non-tender, No masses. Right direct inguinal hernia present, reducible, no changes in color. No hernia on left.  Musculoskeletal: No spinal or CVA tenderness. Full range of motion in the upper and lower extremities.    Skin: No skin rashes or lesions to inspection.  No petechia.    Neurologic: Cranial nerves II through XII are grossly intact and symmetric.  Psychiatric: The patient is alert and oriented times 3.  The patient's affect is not blunted and mood is appropriate.          Data:   WBC -   WBC   Date Value Ref Range Status   04/15/2015 10.8 4.0 - 11.0 10e9/L Final   ], HgB -   Hemoglobin   Date Value Ref Range Status   04/20/2016 15.7 13.3 - 17.7 g/dL Final   ]   Liver Function Studies -   Recent Labs   Lab Test  04/20/16   1010   PROTTOTAL  7.0   ALBUMIN  4.0   BILITOTAL  0.4   ALKPHOS  67   AST  22   ALT  56     No results found for this or any previous visit (from the past 744 hour(s)).     [facility], DO 11/16/2018 5:10 PM

## 2018-11-19 ENCOUNTER — TELEPHONE (OUTPATIENT)
Dept: SURGERY | Facility: CLINIC | Age: 53
End: 2018-11-19

## 2018-11-19 ENCOUNTER — HOSPITAL ENCOUNTER (OUTPATIENT)
Facility: CLINIC | Age: 53
End: 2018-11-19
Attending: SURGERY | Admitting: SURGERY
Payer: COMMERCIAL

## 2018-11-19 NOTE — TELEPHONE ENCOUNTER
Type of surgery: laparoscopic right inguinal hernia repair with mesh  Location of surgery: [facility]  Date and time of surgery: 11/29  Surgeon: Lakeisha  Pre-Op Appt Date: 11/26  Post-Op Appt Date: 12/11   Packet sent out: Yes  Pre-cert/Authorization completed:  Not Applicable  Date: NA

## 2018-11-19 NOTE — TELEPHONE ENCOUNTER
Called patient to schedule surgery. Phone number was disconnected. I also tried to call the emergency contact and that number was disconnected as well.

## 2018-11-20 NOTE — PROGRESS NOTES
[facility]  [address] [address] [address]  [phone]  Dept: [phone]    PRE-OP EVALUATION:  Today's date: 2018    Alejandro Alves (: 1965) presents for pre-operative evaluation assessment as requested by Dr. Suazo.  He requires evaluation and anesthesia risk assessment prior to undergoing surgery/procedure for treatment of Hernia .    Proposed Surgery/ Procedure: Hernia   Date of Surgery/ Procedure: 2018  Time of Surgery/ Procedure: 10:30am  Hospital/Surgical Facility: [facility]  Fax number for surgical facility:   Primary Physician: Janelle Ramachandran  Type of Anesthesia Anticipated: to be determined    Patient has a Health Care Directive or Living Will:  NO    1. NO - Do you have a history of heart attack, stroke, stent, bypass or surgery on an artery in the head, neck, heart or legs?  2. NO - Do you ever have any pain or discomfort in your chest?  3. NO - Do you have a history of  Heart Failure?  4. NO - Are you troubled by shortness of breath when: walking on the level, up a slight hill or at night?  5. NO - Do you currently have a cold, bronchitis or other respiratory infection?  6. NO - Do you have a cough, shortness of breath or wheezing?  7. NO - DO YOU SOMETIMES GET PAINS IN THE CALVES OF YOUR LEGS WHEN YOU WALK?   8. NO - Do you or anyone in your family have previous history of blood clots?  9. NO - Do you or does anyone in your family have a serious bleeding problem such as prolonged bleeding following surgeries or cuts?  10. NO - Have you ever had problems with anemia or been told to take iron pills?  11. NO - Have you had any abnormal blood loss such as black, tarry or bloody stools, or abnormal vaginal bleeding?  12. NO - Have you ever had a blood transfusion?  13. NO - Have you or any of your relatives ever had problems with anesthesia?  14. YES - DO YOU HAVE SLEEP APNEA, EXCESSIVE SNORING OR DAYTIME DROWSINESS? No  snoring, no diagnosis sleep apnea but is more tired lately.    15. NO - Do you have any prosthetic heart valves?  16. NO - Do you have prosthetic joints?  17. NO - Is there any chance that you may be pregnant?      HPI:     HPI related to upcoming procedure:     Right groin hernia.      Smokes- one pack to 1/2 a day smoked for 25 years.    Has a daily cough- smokers cough.  No blood in stool.  Has been fatigued sleeping more for past couple months.  Has been doing more hard labor.  Had some sciatic pain a couple months ago also.  Went to ED for this.      See problem list for active medical problems.  Problems all longstanding and stable, except as noted/documented.  See ROS for pertinent symptoms related to these conditions.                                                                                                                                                          .    MEDICAL HISTORY:     Patient Active Problem List    Diagnosis Date Noted     Subacromial impingement, right 02/09/2017     Priority: Medium     Superior glenoid labrum lesion of right shoulder, initial encounter 02/09/2017     Priority: Medium     Incomplete tear of right rotator cuff 02/09/2017     Priority: Medium     Restless legs syndrome (RLS) 04/20/2016     Priority: Medium     Hip pain, right 04/20/2016     Priority: Medium     Tobacco use disorder 04/11/2016     Priority: Medium      History reviewed. No pertinent past medical history.  Past Surgical History:   Procedure Laterality Date     COLONOSCOPY N/A 6/6/2016    Procedure: COMBINED COLONOSCOPY, SINGLE OR MULTIPLE BIOPSY/POLYPECTOMY BY BIOPSY;  Surgeon: Theron Vigil MD;  Location:  GI     Current Outpatient Prescriptions   Medication Sig Dispense Refill     aspirin-acetaminophen-caffeine (EXCEDRIN MIGRAINE) 250-250-65 MG per tablet Take 1 tablet by mouth every 6 hours as needed for headaches       ibuprofen (ADVIL/MOTRIN) 600 MG tablet Take 1 tablet (600 mg) by  "mouth every 6 hours as needed for moderate pain 30 tablet 0     NAPROXEN PO Take 500 mg by mouth       rOPINIRole (REQUIP) 0.25 MG tablet Take 1 tab three times daily for 1 week, then 2 tabs three times daily for 1 week, then 3 tabs three times daily 189 tablet 3     OTC products: None, except as noted above    No Known Allergies   Latex Allergy: NO    Social History   Substance Use Topics     Smoking status: Current Every Day Smoker     Packs/day: 1.00     Smokeless tobacco: Never Used      Comment: patient states he would like to      Alcohol use No     History   Drug Use     Yes     Comment: cocaine- patient states he quit 6 weeks ago, he is currently at [employer]        REVIEW OF SYSTEMS:   CONSTITUTIONAL: NEGATIVE for fever, chills, change in weight  INTEGUMENTARY/SKIN: NEGATIVE for worrisome rashes, moles or lesions  EYES: NEGATIVE for vision changes or irritation  ENT/MOUTH: NEGATIVE for ear, mouth and throat problems  RESP: NEGATIVE for significant cough or SOB  BREAST: NEGATIVE for masses, tenderness or discharge  CV: NEGATIVE for chest pain, palpitations or peripheral edema  GI: NEGATIVE for nausea, abdominal pain, heartburn, or change in bowel habits  : NEGATIVE for frequency, dysuria, or hematuria  MUSCULOSKELETAL: NEGATIVE for significant arthralgias or myalgia  NEURO: NEGATIVE for weakness, dizziness or paresthesias  ENDOCRINE: NEGATIVE for temperature intolerance, skin/hair changes  HEME: NEGATIVE for bleeding problems  PSYCHIATRIC: NEGATIVE for changes in mood or affect    EXAM:   /62  Pulse 86  Temp 97.9  F (36.6  C) (Temporal)  Resp 16  Ht 5' 8.5\" (1.74 m)  Wt 166 lb 9.6 oz (75.6 kg)  BMI 24.96 kg/m2    GENERAL APPEARANCE: healthy, alert and no distress     EYES: EOMI,  PERRL     HENT: ear canals and TM's normal and nose and mouth without ulcers or lesions     NECK: no adenopathy, no asymmetry, masses, or scars and thyroid normal to palpation     RESP: lungs clear to auscultation - no " rales, rhonchi or wheezes     CV: regular rates and rhythm, normal S1 S2, no S3 or S4 and no murmur, click or rub     ABDOMEN:  soft, nontender, no HSM or masses and bowel sounds normal     MS: extremities normal- no gross deformities noted, no evidence of inflammation in joints, FROM in all extremities.     SKIN: no suspicious lesions or rashes     NEURO: Normal strength and tone, sensory exam grossly normal, mentation intact and speech normal     PSYCH: mentation appears normal. and affect normal/bright     LYMPHATICS: No cervical adenopathy    DIAGNOSTICS:     EKG: appears normal, NSR, normal axis, normal intervals, no acute ST/T changes c/w ischemia, no LVH by voltage criteria, unchanged from previous tracings  Chest XRay  Labs Drawn and in Process:   Unresulted Labs Ordered in the Past 30 Days of this Admission     Date and Time Order Name Status Description    11/26/2018 1505 BASIC METABOLIC PANEL In process     11/26/2018 1505 HIV ANTIGEN ANTIBODY COMBO In process           Recent Labs   Lab Test  04/20/16   1011  04/20/16   1010  04/15/15   1653   HGB  15.7   --   17.4   PLT   --    --   186   NA   --   141  138   POTASSIUM   --   4.4  4.0   CR   --   0.74  0.76        IMPRESSION:   Reason for surgery/procedure: groin pain  Diagnosis/reason for consult: right hernia surgery    The proposed surgical procedure is considered LOW risk.    REVISED CARDIAC RISK INDEX  The patient has the following serious cardiovascular risks for perioperative complications such as (MI, PE, VFib and 3  AV Block):  No serious cardiac risks  INTERPRETATION: 1 risks: Class II (low risk - 0.9% complication rate)    The patient has the following additional risks for perioperative complications:  No identified additional risks      ICD-10-CM    1. Preop general physical exam Z01.818 EKG 12-lead complete w/read - Clinics   2. Cough R05 XR Chest 2 Views- chronic cough- first thing in am.  Not acute.  No sweats, shortness of breath, chest  pain.  CXR normal to my read- await radiology  Recommend smoking cessation.       CBC with platelets and differential- normal     Basic metabolic panel  (Ca, Cl, CO2, Creat, Gluc, K, Na, BUN)   3. Fatigue, unspecified type R53.83 Check basic bloodwork and xr.  Has been working more with more physical labor.    R Chest 2 Views     CBC with platelets and differential     Basic metabolic panel  (Ca, Cl, CO2, Creat, Gluc, K, Na, BUN)   4. Tobacco use disorder F17.200 TOBACCO CESSATION ORDER FOR    5. Screening for HIV (human immunodeficiency virus) Z11.4 HIV Screening   6. Need for prophylactic vaccination and inoculation against influenza Z23 FLU VACCINE, (RIV4) RECOMBINANT HA  , IM (FluBlok, egg free) [20449]- >18 YRS ([facility] [place]  50-64 YRS)     Vaccine Administration, Initial [28885]   7. Need for tetanus booster Z23 TD PRESERV FREE, IM (7+ YRS)   8. Need for vaccination with 13-polyvalent pneumococcal conjugate vaccine Z23 PNEUMOCOCCAL CONJ VACCINE 13 VALENT IM       RECOMMENDATIONS:     --Consult hospital rounder / IM to assist post-op medical management    Pulmonary Risk  Advised smoking cessation.       --Patient is to take all scheduled medications on the day of surgery EXCEPT for modifications listed below.    APPROVAL GIVEN to proceed with proposed procedure, without further diagnostic evaluation       Signed Electronically by: Kisha Vincent NP    Over 45 minutes spent with patient more than 50% in counseling regarding differentials for fatigue, chronic cough, tobacco cessation.      Addendum to note: patient had telephone visit to review fatigue and current symptoms.  This is negative.        Copy of this evaluation report is provided to requesting physician.    [facility] Preop Guidelines    Revised Cardiac Risk Index

## 2018-11-26 ENCOUNTER — RADIANT APPOINTMENT (OUTPATIENT)
Dept: GENERAL RADIOLOGY | Facility: OTHER | Age: 53
End: 2018-11-26
Attending: NURSE PRACTITIONER
Payer: COMMERCIAL

## 2018-11-26 ENCOUNTER — OFFICE VISIT (OUTPATIENT)
Dept: FAMILY MEDICINE | Facility: OTHER | Age: 53
End: 2018-11-26
Payer: COMMERCIAL

## 2018-11-26 VITALS
WEIGHT: 166.6 LBS | HEIGHT: 69 IN | DIASTOLIC BLOOD PRESSURE: 62 MMHG | HEART RATE: 86 BPM | SYSTOLIC BLOOD PRESSURE: 112 MMHG | RESPIRATION RATE: 16 BRPM | BODY MASS INDEX: 24.68 KG/M2 | TEMPERATURE: 97.9 F

## 2018-11-26 DIAGNOSIS — Z23 NEED FOR TETANUS BOOSTER: ICD-10-CM

## 2018-11-26 DIAGNOSIS — Z11.4 SCREENING FOR HIV (HUMAN IMMUNODEFICIENCY VIRUS): ICD-10-CM

## 2018-11-26 DIAGNOSIS — R05.9 COUGH: ICD-10-CM

## 2018-11-26 DIAGNOSIS — R53.83 FATIGUE, UNSPECIFIED TYPE: ICD-10-CM

## 2018-11-26 DIAGNOSIS — F17.200 TOBACCO USE DISORDER: ICD-10-CM

## 2018-11-26 DIAGNOSIS — Z23 NEED FOR VACCINATION WITH 13-POLYVALENT PNEUMOCOCCAL CONJUGATE VACCINE: ICD-10-CM

## 2018-11-26 DIAGNOSIS — Z23 NEED FOR PROPHYLACTIC VACCINATION AND INOCULATION AGAINST INFLUENZA: ICD-10-CM

## 2018-11-26 DIAGNOSIS — Z01.818 PREOP GENERAL PHYSICAL EXAM: Primary | ICD-10-CM

## 2018-11-26 DIAGNOSIS — Z72.0 TOBACCO ABUSE: ICD-10-CM

## 2018-11-26 LAB
ANION GAP SERPL CALCULATED.3IONS-SCNC: 9 MMOL/L (ref 3–14)
BASOPHILS # BLD AUTO: 0.1 10E9/L (ref 0–0.2)
BASOPHILS NFR BLD AUTO: 1.1 %
BUN SERPL-MCNC: 19 MG/DL (ref 7–30)
CALCIUM SERPL-MCNC: 8.9 MG/DL (ref 8.5–10.1)
CHLORIDE SERPL-SCNC: 106 MMOL/L (ref 94–109)
CO2 SERPL-SCNC: 26 MMOL/L (ref 20–32)
CREAT SERPL-MCNC: 0.76 MG/DL (ref 0.66–1.25)
DIFFERENTIAL METHOD BLD: NORMAL
EOSINOPHIL # BLD AUTO: 0.3 10E9/L (ref 0–0.7)
EOSINOPHIL NFR BLD AUTO: 4.3 %
ERYTHROCYTE [DISTWIDTH] IN BLOOD BY AUTOMATED COUNT: 13.2 % (ref 10–15)
GFR SERPL CREATININE-BSD FRML MDRD: >90 ML/MIN/1.7M2
GLUCOSE SERPL-MCNC: 102 MG/DL (ref 70–99)
HCT VFR BLD AUTO: 44.2 % (ref 40–53)
HGB BLD-MCNC: 14.8 G/DL (ref 13.3–17.7)
LYMPHOCYTES # BLD AUTO: 1.6 10E9/L (ref 0.8–5.3)
LYMPHOCYTES NFR BLD AUTO: 25.7 %
MCH RBC QN AUTO: 31.3 PG (ref 26.5–33)
MCHC RBC AUTO-ENTMCNC: 33.5 G/DL (ref 31.5–36.5)
MCV RBC AUTO: 93 FL (ref 78–100)
MONOCYTES # BLD AUTO: 0.5 10E9/L (ref 0–1.3)
MONOCYTES NFR BLD AUTO: 7.4 %
NEUTROPHILS # BLD AUTO: 3.8 10E9/L (ref 1.6–8.3)
NEUTROPHILS NFR BLD AUTO: 61.5 %
PLATELET # BLD AUTO: 242 10E9/L (ref 150–450)
POTASSIUM SERPL-SCNC: 4.1 MMOL/L (ref 3.4–5.3)
RBC # BLD AUTO: 4.73 10E12/L (ref 4.4–5.9)
SODIUM SERPL-SCNC: 141 MMOL/L (ref 133–144)
WBC # BLD AUTO: 6.2 10E9/L (ref 4–11)

## 2018-11-26 PROCEDURE — 90472 IMMUNIZATION ADMIN EACH ADD: CPT | Performed by: NURSE PRACTITIONER

## 2018-11-26 PROCEDURE — 80048 BASIC METABOLIC PNL TOTAL CA: CPT | Performed by: NURSE PRACTITIONER

## 2018-11-26 PROCEDURE — 36415 COLL VENOUS BLD VENIPUNCTURE: CPT | Performed by: NURSE PRACTITIONER

## 2018-11-26 PROCEDURE — 90714 TD VACC NO PRESV 7 YRS+ IM: CPT | Performed by: NURSE PRACTITIONER

## 2018-11-26 PROCEDURE — 71046 X-RAY EXAM CHEST 2 VIEWS: CPT | Mod: FY

## 2018-11-26 PROCEDURE — 90670 PCV13 VACCINE IM: CPT | Performed by: NURSE PRACTITIONER

## 2018-11-26 PROCEDURE — 87389 HIV-1 AG W/HIV-1&-2 AB AG IA: CPT | Performed by: NURSE PRACTITIONER

## 2018-11-26 PROCEDURE — 85025 COMPLETE CBC W/AUTO DIFF WBC: CPT | Performed by: NURSE PRACTITIONER

## 2018-11-26 PROCEDURE — 90682 RIV4 VACC RECOMBINANT DNA IM: CPT | Performed by: NURSE PRACTITIONER

## 2018-11-26 PROCEDURE — 93000 ELECTROCARDIOGRAM COMPLETE: CPT | Performed by: NURSE PRACTITIONER

## 2018-11-26 PROCEDURE — 90471 IMMUNIZATION ADMIN: CPT | Performed by: NURSE PRACTITIONER

## 2018-11-26 PROCEDURE — 99215 OFFICE O/P EST HI 40 MIN: CPT | Mod: 25 | Performed by: NURSE PRACTITIONER

## 2018-11-26 ASSESSMENT — PAIN SCALES - GENERAL: PAINLEVEL: NO PAIN (0)

## 2018-11-26 NOTE — NURSING NOTE
Screening Questionnaire for Adult Immunization    Are you sick today?   No   Do you have allergies to medications, food, a vaccine component or latex?   No   Have you ever had a serious reaction after receiving a vaccination?   No   Do you have a long-term health problem with heart disease, lung disease, asthma, kidney disease, metabolic disease (e.g. diabetes), anemia, or other blood disorder?   No   Do you have cancer, leukemia, HIV/AIDS, or any other immune system problem?   No   In the past 3 months, have you taken medications that affect  your immune system, such as prednisone, other steroids, or anticancer drugs; drugs for the treatment of rheumatoid arthritis, Crohn s disease, or psoriasis; or have you had radiation treatments?   Yes   Have you had a seizure, or a brain or other nervous system problem?   No   During the past year, have you received a transfusion of blood or blood     products, or been given immune (gamma) globulin or antiviral drug?   No   For women: Are you pregnant or is there a chance you could become        pregnant during the next month?   No   Have you received any vaccinations in the past 4 weeks?   No     Immunization questionnaire was positive for at least one answer.  Notified Kisha Vincent.        Per orders of Kisha Vincent, injection of TD, PCV 13, Flu given by Dorothy Mullins. Patient instructed to remain in clinic for 15 minutes afterwards, and to report any adverse reaction to me immediately.       Screening performed by Dorothy Mullins on 11/26/2018 at 3:27 PM.    Prior to injection verified patient identity using patient's name and date of birth.  Due to injection administration, patient instructed to remain in clinic for 15 minutes  afterwards, and to report any adverse reaction to me immediately.

## 2018-11-26 NOTE — PROGRESS NOTES

## 2018-11-26 NOTE — MR AVS SNAPSHOT
After Visit Summary   11/26/2018    Alejandro Alves    MRN: [id number]           Patient Information     Date Of Birth          1965        Visit Information        Provider Department      11/26/2018 2:30 PM Kisha Vincent NP [facility]'s Diagnoses     Preop general physical exam    -  1    Cough        Fatigue, unspecified type        Tobacco use disorder        Screening for HIV (human immunodeficiency virus)        Need for prophylactic vaccination and inoculation against influenza        Need for tetanus booster        Need for vaccination with 13-polyvalent pneumococcal conjugate vaccine          Care Instructions      Before Your Surgery      Call your surgeon if there is any change in your health. This includes signs of a cold or flu (such as a sore throat, runny nose, cough, rash or fever).    Do not smoke, drink alcohol or take over the counter medicine (unless your surgeon or primary care doctor tells you to) for the 24 hours before and after surgery.    If you take prescribed drugs: Follow your doctor s orders about which medicines to take and which to stop until after surgery.    Eating and drinking prior to surgery: follow the instructions from your surgeon    Take a shower or bath the night before surgery. Use the soap your surgeon gave you to gently clean your skin. If you do not have soap from your surgeon, use your regular soap. Do not shave or scrub the surgery site.  Wear clean pajamas and have clean sheets on your bed.           Follow-ups after your visit        Follow-up notes from your care team     Return in about 4 weeks (around 12/24/2018), or if symptoms worsen or fail to improve.      Your next 10 appointments already scheduled     Nov 29, 2018   Procedure with Mercedes Suazo MD   [facility] ([facility])    [address] [address]   [phone]           From [facility] [address]:  "Exit at [address] on south side of [place]. Turn right on [address]. Turn left at stoplight on [address]. [facility] will be in view two blocks ahead            Dec 11, 2018  4:00 PM CST   Return Visit with Mercedes Suazo MD   [facility]    [address] [id number] [phone]              Who to contact     If you have questions or need follow up information about today's clinic visit or your schedule please contact [facility] directly at [phone].  Normal or non-critical lab and imaging results will be communicated to you by MyChart, letter or phone within 4 business days after the clinic has received the results. If you do not hear from us within 7 days, please contact the clinic through MyChart or phone. If you have a critical or abnormal lab result, we will notify you by phone as soon as possible.  Submit refill requests through [employer] or call your pharmacy and they will forward the refill request to us. Please allow 3 business days for your refill to be completed.          Additional Information About Your Visit        Care EveryWhere ID     This is your Care EveryWhere ID. This could be used by other organizations to access your [place] medical records  [id number]        Your Vitals Were     Pulse Temperature Respirations Height BMI (Body Mass Index)       86 97.9  F (36.6  C) (Temporal) 16 5' 8.5\" (1.74 m) 24.96 kg/m2        Blood Pressure from Last 3 Encounters:   11/26/18 112/62   11/16/18 106/64   11/09/18 120/68    Weight from Last 3 Encounters:   11/26/18 166 lb 9.6 oz (75.6 kg)   11/16/18 167 lb 8 oz (76 kg)   11/09/18 168 lb 12.8 oz (76.6 kg)              We Performed the Following     Basic metabolic panel  (Ca, Cl, CO2, Creat, Gluc, K, Na, BUN)     CBC with platelets and differential     EKG 12-lead complete w/read - Clinics     FLU VACCINE, (RIV4) RECOMBINANT HA  , IM (FluBlok, egg " free) [22250]- >18 YRS ([facility] recommended  50-64 YRS)     HIV Screening     PNEUMOCOCCAL CONJ VACCINE 13 VALENT IM     TD PRESERV FREE, IM (7+ YRS)     TOBACCO CESSATION ORDER FOR HM     Vaccine Administration, Initial [20913]        Primary Care Provider Office Phone # Fax JOSELINE Michelle [phone]       [address] [place] [address]        Equal Access to Services     [facility]: Hadii aad ku hadasho Soomaali, waaxda luqadaha, qaybta kaalmada adeegyada, waxay idiin hayaan adeeg kharash la'aan . So [facility] [phone].    ATENCIÓN: Si habla español, tiene a mcnamara disposición servicios gratuitos de asistencia lingüística. [facility] [phone].    We comply with applicable federal civil rights laws and [place] laws. We do not discriminate on the basis of race, color, national origin, age, disability, sex, sexual orientation, or gender identity.            Thank you!     Thank you for choosing [facility]  for your care. Our goal is always to provide you with excellent care. Hearing back from our patients is one way we can continue to improve our services. Please take a few minutes to complete the written survey that you may receive in the mail after your visit with us. Thank you!             Your Updated Medication List - Protect others around you: Learn how to safely use, store and throw away your medicines at www.disposemymeds.org.          This list is accurate as of 11/26/18  3:57 PM.  Always use your most recent med list.                   Brand Name Dispense Instructions for use Diagnosis    aspirin-acetaminophen-caffeine 250-250-65 MG tablet    EXCEDRIN MIGRAINE     Take 1 tablet by mouth every 6 hours as needed for headaches        ibuprofen 600 MG tablet    ADVIL/MOTRIN    30 tablet    Take 1 tablet (600 mg) by mouth every 6 hours as needed for moderate pain        NAPROXEN PO      Take 500 mg by mouth        rOPINIRole 0.25 MG tablet    REQUIP     189 tablet    Take 1 tab three times daily for 1 week, then 2 tabs three times daily for 1 week, then 3 tabs three times daily    Restless legs syndrome (RLS)

## 2018-11-27 LAB — HIV 1+2 AB+HIV1 P24 AG SERPL QL IA: NONREACTIVE

## 2018-11-28 ENCOUNTER — ANESTHESIA EVENT (OUTPATIENT)
Dept: SURGERY | Facility: CLINIC | Age: 53
End: 2018-11-28

## 2018-11-28 RX ORDER — CEFAZOLIN SODIUM 1 G/3ML
1 INJECTION, POWDER, FOR SOLUTION INTRAMUSCULAR; INTRAVENOUS SEE ADMIN INSTRUCTIONS
Status: CANCELLED | OUTPATIENT
Start: 2018-11-28

## 2018-11-28 RX ORDER — CEFAZOLIN SODIUM 2 G/100ML
2 INJECTION, SOLUTION INTRAVENOUS
Status: CANCELLED | OUTPATIENT
Start: 2018-11-28

## 2018-11-28 ASSESSMENT — LIFESTYLE VARIABLES: TOBACCO_USE: 1

## 2018-11-28 NOTE — H&P (VIEW-ONLY)
[facility]  [address] [address]  [phone]  Dept: [phone]    PRE-OP EVALUATION:  Today's date: 2018    Alejandro Alves (: 1965) presents for pre-operative evaluation assessment as requested by Dr. Suazo.  He requires evaluation and anesthesia risk assessment prior to undergoing surgery/procedure for treatment of Hernia .    Proposed Surgery/ Procedure: Hernia   Date of Surgery/ Procedure: 2018  Time of Surgery/ Procedure: 10:30am  Hospital/Surgical Facility[facility]  Fax number for surgical facility:   Primary Physician: Janelle Ramachandran  Type of Anesthesia Anticipated: to be determined    Patient has a Health Care Directive or Living Will:  NO    1. NO - Do you have a history of heart attack, stroke, stent, bypass or surgery on an artery in the head, neck, heart or legs?  2. NO - Do you ever have any pain or discomfort in your chest?  3. NO - Do you have a history of  Heart Failure?  4. NO - Are you troubled by shortness of breath when: walking on the level, up a slight hill or at night?  5. NO - Do you currently have a cold, bronchitis or other respiratory infection?  6. NO - Do you have a cough, shortness of breath or wheezing?  7. NO - DO YOU SOMETIMES GET PAINS IN THE CALVES OF YOUR LEGS WHEN YOU WALK?   8. NO - Do you or anyone in your family have previous history of blood clots?  9. NO - Do you or does anyone in your family have a serious bleeding problem such as prolonged bleeding following surgeries or cuts?  10. NO - Have you ever had problems with anemia or been told to take iron pills?  11. NO - Have you had any abnormal blood loss such as black, tarry or bloody stools, or abnormal vaginal bleeding?  12. NO - Have you ever had a blood transfusion?  13. NO - Have you or any of your relatives ever had problems with anesthesia?  14. YES - DO YOU HAVE SLEEP APNEA, EXCESSIVE SNORING OR DAYTIME DROWSINESS? No  snoring, no diagnosis sleep apnea but is more tired lately.    15. NO - Do you have any prosthetic heart valves?  16. NO - Do you have prosthetic joints?  17. NO - Is there any chance that you may be pregnant?      HPI:     HPI related to upcoming procedure:     Right groin hernia.      Smokes- one pack to 1/2 a day smoked for 25 years.    Has a daily cough- smokers cough.  No blood in stool.  Has been fatigued sleeping more for past couple months.  Has been doing more hard labor.  Had some sciatic pain a couple months ago also.  Went to ED for this.      See problem list for active medical problems.  Problems all longstanding and stable, except as noted/documented.  See ROS for pertinent symptoms related to these conditions.                                                                                                                                                          .    MEDICAL HISTORY:     Patient Active Problem List    Diagnosis Date Noted     Subacromial impingement, right 02/09/2017     Priority: Medium     Superior glenoid labrum lesion of right shoulder, initial encounter 02/09/2017     Priority: Medium     Incomplete tear of right rotator cuff 02/09/2017     Priority: Medium     Restless legs syndrome (RLS) 04/20/2016     Priority: Medium     Hip pain, right 04/20/2016     Priority: Medium     Tobacco use disorder 04/11/2016     Priority: Medium      History reviewed. No pertinent past medical history.  Past Surgical History:   Procedure Laterality Date     COLONOSCOPY N/A 6/6/2016    Procedure: COMBINED COLONOSCOPY, SINGLE OR MULTIPLE BIOPSY/POLYPECTOMY BY BIOPSY;  Surgeon: Theron Vigil MD;  Location:  GI     Current Outpatient Prescriptions   Medication Sig Dispense Refill     aspirin-acetaminophen-caffeine (EXCEDRIN MIGRAINE) 250-250-65 MG per tablet Take 1 tablet by mouth every 6 hours as needed for headaches       ibuprofen (ADVIL/MOTRIN) 600 MG tablet Take 1 tablet (600 mg) by  "mouth every 6 hours as needed for moderate pain 30 tablet 0     NAPROXEN PO Take 500 mg by mouth       rOPINIRole (REQUIP) 0.25 MG tablet Take 1 tab three times daily for 1 week, then 2 tabs three times daily for 1 week, then 3 tabs three times daily 189 tablet 3     OTC products: None, except as noted above    No Known Allergies   Latex Allergy: NO    Social History   Substance Use Topics     Smoking status: Current Every Day Smoker     Packs/day: 1.00     Smokeless tobacco: Never Used      Comment: patient states he would like to      Alcohol use No     History   Drug Use     Yes     Comment: cocaine- patient states he quit 6 weeks ago, he is currently at [place]        REVIEW OF SYSTEMS:   CONSTITUTIONAL: NEGATIVE for fever, chills, change in weight  INTEGUMENTARY/SKIN: NEGATIVE for worrisome rashes, moles or lesions  EYES: NEGATIVE for vision changes or irritation  ENT/MOUTH: NEGATIVE for ear, mouth and throat problems  RESP: NEGATIVE for significant cough or SOB  BREAST: NEGATIVE for masses, tenderness or discharge  CV: NEGATIVE for chest pain, palpitations or peripheral edema  GI: NEGATIVE for nausea, abdominal pain, heartburn, or change in bowel habits  : NEGATIVE for frequency, dysuria, or hematuria  MUSCULOSKELETAL: NEGATIVE for significant arthralgias or myalgia  NEURO: NEGATIVE for weakness, dizziness or paresthesias  ENDOCRINE: NEGATIVE for temperature intolerance, skin/hair changes  HEME: NEGATIVE for bleeding problems  PSYCHIATRIC: NEGATIVE for changes in mood or affect    EXAM:   /62  Pulse 86  Temp 97.9  F (36.6  C) (Temporal)  Resp 16  Ht 5' 8.5\" (1.74 m)  Wt 166 lb 9.6 oz (75.6 kg)  BMI 24.96 kg/m2    GENERAL APPEARANCE: healthy, alert and no distress     EYES: EOMI,  PERRL     HENT: ear canals and TM's normal and nose and mouth without ulcers or lesions     NECK: no adenopathy, no asymmetry, masses, or scars and thyroid normal to palpation     RESP: lungs clear to auscultation - no " rales, rhonchi or wheezes     CV: regular rates and rhythm, normal S1 S2, no S3 or S4 and no murmur, click or rub     ABDOMEN:  soft, nontender, no HSM or masses and bowel sounds normal     MS: extremities normal- no gross deformities noted, no evidence of inflammation in joints, FROM in all extremities.     SKIN: no suspicious lesions or rashes     NEURO: Normal strength and tone, sensory exam grossly normal, mentation intact and speech normal     PSYCH: mentation appears normal. and affect normal/bright     LYMPHATICS: No cervical adenopathy    DIAGNOSTICS:     EKG: appears normal, NSR, normal axis, normal intervals, no acute ST/T changes c/w ischemia, no LVH by voltage criteria, unchanged from previous tracings  Chest XRay  Labs Drawn and in Process:   Unresulted Labs Ordered in the Past 30 Days of this Admission     Date and Time Order Name Status Description    11/26/2018 1505 BASIC METABOLIC PANEL In process     11/26/2018 1505 HIV ANTIGEN ANTIBODY COMBO In process           Recent Labs   Lab Test  04/20/16   1011  04/20/16   1010  04/15/15   1653   HGB  15.7   --   17.4   PLT   --    --   186   NA   --   141  138   POTASSIUM   --   4.4  4.0   CR   --   0.74  0.76        IMPRESSION:   Reason for surgery/procedure: groin pain  Diagnosis/reason for consult: right hernia surgery    The proposed surgical procedure is considered LOW risk.    REVISED CARDIAC RISK INDEX  The patient has the following serious cardiovascular risks for perioperative complications such as (MI, PE, VFib and 3  AV Block):  No serious cardiac risks  INTERPRETATION: 1 risks: Class II (low risk - 0.9% complication rate)    The patient has the following additional risks for perioperative complications:  No identified additional risks      ICD-10-CM    1. Preop general physical exam Z01.818 EKG 12-lead complete w/read - Clinics   2. Cough R05 XR Chest 2 Views- chronic cough- first thing in am.  Not acute.  No sweats, shortness of breath, chest  pain.  CXR normal to my read- await radiology  Recommend smoking cessation.       CBC with platelets and differential- normal     Basic metabolic panel  (Ca, Cl, CO2, Creat, Gluc, K, Na, BUN)   3. Fatigue, unspecified type R53.83 Check basic bloodwork and xr.  Has been working more with more physical labor.    R Chest 2 Views     CBC with platelets and differential     Basic metabolic panel  (Ca, Cl, CO2, Creat, Gluc, K, Na, BUN)   4. Tobacco use disorder F17.200 TOBACCO CESSATION ORDER FOR    5. Screening for HIV (human immunodeficiency virus) Z11.4 HIV Screening   6. Need for prophylactic vaccination and inoculation against influenza Z23 FLU VACCINE, (RIV4) RECOMBINANT HA  , IM (FluBlok, egg free) [57469]- >18 YRS ([facility] recommended  50-64 YRS)     Vaccine Administration, Initial [56660]   7. Need for tetanus booster Z23 TD PRESERV FREE, IM (7+ YRS)   8. Need for vaccination with 13-polyvalent pneumococcal conjugate vaccine Z23 PNEUMOCOCCAL CONJ VACCINE 13 VALENT IM       RECOMMENDATIONS:     --Consult hospital rounder / IM to assist post-op medical management    Pulmonary Risk  Advised smoking cessation.       --Patient is to take all scheduled medications on the day of surgery EXCEPT for modifications listed below.    APPROVAL GIVEN to proceed with proposed procedure, without further diagnostic evaluation       Signed Electronically by: Kisha Vincent NP    Over 45 minutes spent with patient more than 50% in counseling regarding differentials for fatigue, chronic cough, tobacco cessation.        Copy of this evaluation report is provided to requesting physician.    [facility] Preop Guidelines    Revised Cardiac Risk Index

## 2018-11-29 ENCOUNTER — ANESTHESIA (OUTPATIENT)
Dept: SURGERY | Facility: CLINIC | Age: 53
End: 2018-11-29

## 2018-11-29 RX ORDER — SODIUM CHLORIDE, SODIUM LACTATE, POTASSIUM CHLORIDE, CALCIUM CHLORIDE 600; 310; 30; 20 MG/100ML; MG/100ML; MG/100ML; MG/100ML
INJECTION, SOLUTION INTRAVENOUS CONTINUOUS
Status: CANCELLED | OUTPATIENT
Start: 2018-11-29

## 2018-11-29 NOTE — ANESTHESIA PREPROCEDURE EVALUATION
Anesthesia Evaluation     . Pt has had prior anesthetic. Type: MAC    No history of anesthetic complications          ROS/MED HX    ENT/Pulmonary:     (+)tobacco use, Current use , . .    Neurologic: Comment: Restless leg syndrome - neg neurologic ROS     Cardiovascular:  - neg cardiovascular ROS   (+) ----. : . . . :. . Previous cardiac testing date:results:date: results:ECG reviewed date:11- results:SR date: results:          METS/Exercise Tolerance:     Hematologic:  - neg hematologic  ROS       Musculoskeletal:  - neg musculoskeletal ROS       GI/Hepatic:  - neg GI/hepatic ROS       Renal/Genitourinary:  - ROS Renal section negative       Endo:  - neg endo ROS       Psychiatric: Comment: Quit cocaine 6 weeks ago. InTreatment at [place]        Infectious Disease:  - neg infectious disease ROS       Malignancy:      - no malignancy   Other:    - neg other ROS                 Physical Exam  Normal systems: cardiovascular, pulmonary and dental    Airway   Mallampati: II  TM distance: <3 FB  Neck ROM: full    Dental     Cardiovascular   Rhythm and rate: regular and normal      Pulmonary    breath sounds clear to auscultation                    Anesthesia Plan      History & Physical Review  History and physical reviewed and following examination; no interval change.    ASA Status:  2 .    NPO Status:  > 8 hours    Plan for General and ETT with Intravenous and Propofol induction. Maintenance will be Balanced.    PONV prophylaxis:  Ondansetron (or other 5HT-3) and Dexamethasone or Solumedrol       Postoperative Care  Postoperative pain management:  IV analgesics, Oral pain medications and Multi-modal analgesia.      Consents  Anesthetic plan, risks, benefits and alternatives discussed with:  Patient.  Use of blood products discussed: No .   .                          .

## 2018-12-14 ENCOUNTER — HOSPITAL ENCOUNTER (OUTPATIENT)
Facility: CLINIC | Age: 53
End: 2018-12-14
Attending: SURGERY | Admitting: SURGERY
Payer: COMMERCIAL

## 2018-12-14 NOTE — TELEPHONE ENCOUNTER
Type of surgery: Laparoscopic right inguinal hernia repair with mesh  Location of surgery: [facility]  Date and time of surgery: 1/3  Surgeon: Suazo  Pre-Op Appt Date: 11/26- checking with PCP  Post-Op Appt Date: 1/15   Packet sent out: Yes  Pre-cert/Authorization completed:  Not Applicable  Date: NA

## 2018-12-14 NOTE — TELEPHONE ENCOUNTER
Kisha- pt had preop 11/26 but then had to cancel surgery.  Hernia surgery is rescheduled for 1/3.  Are you willing to addend recent preop or does he need to come see you again?  Please let me know.    Thank you!

## 2018-12-18 NOTE — PROGRESS NOTES
LMTC with Alee (c2c is on file) informed her that Kisha Vincent would like to schedule a phone visit to touch base how patient is feeling, please assist in scheduling  Thanks  Melly Cardenas RT (R)

## 2018-12-19 NOTE — TELEPHONE ENCOUNTER
"Spoke with Alee (patients girlfriend) she states she has not seen him for days, she did send him a text to call us to schedule appointment, she also stated that \"when he gets like this she doesn't see or hear from him for days\"  Please advise   Thanks   Melly Cardenas RT (R)         "

## 2018-12-27 ENCOUNTER — TELEPHONE (OUTPATIENT)
Dept: FAMILY MEDICINE | Facility: OTHER | Age: 53
End: 2018-12-27

## 2018-12-27 NOTE — TELEPHONE ENCOUNTER
Reason for Call:  Other returning call    Detailed comments: Please call patient back.  He was told by Dr Suazo that he talked to you about extending his pre op for surgery on 1/3/19 but you wanted to talk to him.  Please call    Phone Number Patient can be reached at: Other phone number:  [phone]*    Best Time: any    Can we leave a detailed message on this number? YES    Call taken on 12/27/2018 at 1:30 PM by Shikha Barnes

## 2018-12-28 ENCOUNTER — VIRTUAL VISIT (OUTPATIENT)
Dept: FAMILY MEDICINE | Facility: OTHER | Age: 53
End: 2018-12-28
Payer: COMMERCIAL

## 2018-12-28 DIAGNOSIS — Z01.818 PRE-OP EXAM: Primary | ICD-10-CM

## 2018-12-28 PROCEDURE — 99441 ZZC PHYSICIAN TELEPHONE EVALUATION 5-10 MIN: CPT | Performed by: NURSE PRACTITIONER

## 2018-12-28 NOTE — PROGRESS NOTES
"Alejandro Alves is a 53 year old male who is being evaluated via a telephone visit.      The patient has been notified of following (by LAKEISHA Gallego MA     \"We have found that certain health care needs can be provided without the need for a physical exam.  This service lets us provide the care you need with a short phone conversation.  If a prescription is necessary we can send it directly to your pharmacy.  If lab work is needed we can place an order for that and you can then stop by our lab to have the test done at a later time.    This telephone visit will be conducted via 3 way call with the you (the patient) , the physician/provider, and a me all on the line at the same time.  This allows your physician/provider to have the phone conversation with you while I will be taking notes for your medical record.  We will have full access to your [place] medical record during this entire phone call.    Since this is like an office visit,  will bill your insurance company for this service.  Please check with your medical insurance if this type of telephone/virtual is covered . You may be responsible for the cost of this service if insurance coverage is denied.  The typical cost is $30 (10min), $59(11-20min) and $85 (21-30min)     If during the course of the call the physician/provider feels a telephone visit is not appropriate, you will not be charged for this service\"    Consent has been obtained for this service by care team member: yes.  See the scanned image in the medical record.    S: The history as provided by the patient to the provider during this 3 way call include pre op    Pertinent parts of the the patient's medical history reviewed and confirmed by the provider included :     Patient seen on 11/26/18 for pre op for hernia repair.  He was having some fatigue at the time but had normal CXR and bloodwork.      Fatigue- feels resolved.  Has been working a lot.  A lot of physical labor and tries to " rest and eat more and sleep better.    Cold symptoms- no cold symptoms, shortness of breath, fever, wheezing, chest pain.      He is a little more anxious about missing work after surgery.  He is getting help from family which is working great.  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      1. NO - Do you have a history of heart attack, stroke, stent, bypass or surgery on an artery in the head, neck, heart or legs?  2. NO - Do you ever have any pain or discomfort in your chest?  3. NO - Do you have a history of  Heart Failure?  4. NO - Are you troubled by shortness of breath when: walking on the level, up a slight hill or at night?  5. NO - Do you currently have a cold, bronchitis or other respiratory infection?  6. NO - Do you have a cough, shortness of breath or wheezing?  7. NO - DO YOU SOMETIMES GET PAINS IN THE CALVES OF YOUR LEGS WHEN YOU WALK?   8. NO - Do you or anyone in your family have previous history of blood clots?  9. NO - Do you or does anyone in your family have a serious bleeding problem such as prolonged bleeding following surgeries or cuts?  10. NO - Have you ever had problems with anemia or been told to take iron pills?  11. NO - Have you had any abnormal blood loss such as black, tarry or bloody stools, or abnormal vaginal bleeding?  12. NO - Have you ever had a blood transfusion?  13. NO - Have you or any of your relatives ever had problems with anesthesia?  14. No - DO YOU HAVE SLEEP APNEA, EXCESSIVE SNORING OR DAYTIME DROWSINESS? .    15. NO - Do you have any prosthetic heart valves?  16. NO - Do you have prosthetic joints?  17. NO - Is there any chance that you may be pregnant?    Total time of call between patient and provider was 6 minutes     Kisha Vincent CNP  (MD signature)  ===================================================    I have reviewed the note as documented above.  This accurately captures the substance of my conversation with the patient,    Additional provider  notes: addendum to 11/26/18 pre- op.  Patient is cleared for surgery for 1/3/18 for hernia repair.      Assessment/Plan:  No diagnosis found.

## 2019-01-02 RX ORDER — CEFAZOLIN SODIUM 1 G/3ML
1 INJECTION, POWDER, FOR SOLUTION INTRAMUSCULAR; INTRAVENOUS SEE ADMIN INSTRUCTIONS
Status: CANCELLED | OUTPATIENT
Start: 2019-01-02

## 2019-01-02 RX ORDER — CEFAZOLIN SODIUM 2 G/100ML
2 INJECTION, SOLUTION INTRAVENOUS
Status: CANCELLED | OUTPATIENT
Start: 2019-01-02

## 2019-01-02 RX ORDER — HEPARIN SODIUM 5000 [USP'U]/.5ML
5000 INJECTION, SOLUTION INTRAVENOUS; SUBCUTANEOUS
Status: CANCELLED | OUTPATIENT
Start: 2019-01-02

## 2019-01-03 ENCOUNTER — ANESTHESIA (OUTPATIENT)
Dept: SURGERY | Facility: CLINIC | Age: 54
End: 2019-01-03

## 2019-01-03 ENCOUNTER — ANESTHESIA EVENT (OUTPATIENT)
Dept: SURGERY | Facility: CLINIC | Age: 54
End: 2019-01-03

## 2019-01-03 ASSESSMENT — LIFESTYLE VARIABLES: TOBACCO_USE: 1

## 2019-01-03 NOTE — ANESTHESIA PREPROCEDURE EVALUATION
Anesthesia Pre-Procedure Evaluation    Patient: Alejandro Alves   MRN: [id number] : 1965          Preoperative Diagnosis: symptomatic right inguinal    Procedure(s):  LAPAROSCOPIC HERNIORRHAPHY INGUINAL REPAIR WITH MESH    History reviewed. No pertinent past medical history.  Past Surgical History:   Procedure Laterality Date     COLONOSCOPY N/A 2016    Procedure: COMBINED COLONOSCOPY, SINGLE OR MULTIPLE BIOPSY/POLYPECTOMY BY BIOPSY;  Surgeon: Theron Vigil MD;  Location:  GI       Anesthesia Evaluation     . Pt has had prior anesthetic. Type: MAC    No history of anesthetic complications          ROS/MED HX    ENT/Pulmonary:     (+)tobacco use, Current use , . .    Neurologic: Comment: Restless leg syndrome - neg neurologic ROS     Cardiovascular:  - neg cardiovascular ROS   (+) ----. : . . . :. . Previous cardiac testing date:results:date: results:ECG reviewed date:2018 results:SR date: results:          METS/Exercise Tolerance:     Hematologic:  - neg hematologic  ROS       Musculoskeletal:  - neg musculoskeletal ROS       GI/Hepatic:  - neg GI/hepatic ROS       Renal/Genitourinary:  - ROS Renal section negative       Endo:  - neg endo ROS       Psychiatric: Comment: Quit cocaine 6 weeks ago. InTreatment at [facility]        Infectious Disease:  - neg infectious disease ROS       Malignancy:      - no malignancy   Other:    - neg other ROS                      Physical Exam  Normal systems: cardiovascular, pulmonary and dental    Airway   Mallampati: II  TM distance: <3 FB  Neck ROM: full    Dental     Cardiovascular   Rhythm and rate: regular and normal      Pulmonary    breath sounds clear to auscultation            Lab Results   Component Value Date    WBC 6.2 2018    HGB 14.8 2018    HCT 44.2 2018     2018     2018    POTASSIUM 4.1 2018    CHLORIDE 106 2018    CO2 26 2018    BUN 19 2018    CR 0.76  "11/26/2018     (H) 11/26/2018    SUJEY 8.9 11/26/2018    ALBUMIN 4.0 04/20/2016    PROTTOTAL 7.0 04/20/2016    ALT 56 04/20/2016    AST 22 04/20/2016    ALKPHOS 67 04/20/2016    BILITOTAL 0.4 04/20/2016    LIPASE 129 04/15/2015       Preop Vitals  BP Readings from Last 3 Encounters:   11/26/18 112/62   11/16/18 106/64   11/09/18 120/68    Pulse Readings from Last 3 Encounters:   11/26/18 86   11/16/18 99   11/09/18 101      Resp Readings from Last 3 Encounters:   11/26/18 16   08/27/18 20   01/11/17 14    SpO2 Readings from Last 3 Encounters:   11/16/18 96%   11/09/18 96%   08/27/18 98%      Temp Readings from Last 1 Encounters:   11/26/18 97.9  F (36.6  C) (Temporal)    Ht Readings from Last 1 Encounters:   11/26/18 1.74 m (5' 8.5\")      Wt Readings from Last 1 Encounters:   11/26/18 75.6 kg (166 lb 9.6 oz)    Estimated body mass index is 24.96 kg/m  as calculated from the following:    Height as of 11/26/18: 1.74 m (5' 8.5\").    Weight as of 11/26/18: 75.6 kg (166 lb 9.6 oz).       Anesthesia Plan      History & Physical Review  History and physical reviewed and following examination; no interval change.    ASA Status:  2 .    NPO Status:  > 8 hours    Plan for General and ETT with Intravenous and Propofol induction. Maintenance will be Balanced.           Postoperative Care  Postoperative pain management:  IV analgesics, Oral pain medications and Multi-modal analgesia.      Consents  Anesthetic plan, risks, benefits and alternatives discussed with:  Patient.  Use of blood products discussed: No .   .                 JOSELINE Boggs CRNA  "

## 2019-01-03 NOTE — OR NURSING
"1210 Per Dr. Suazo, telephone call to the patient to see if there on the way to procedure center. Pt.s significant other answered call and said he's having a \"Meltdown\" to get in the shower. Notified Dr. Suazo of the information and why the patient is late to OR facilty. Dr. Suazo said if he's having a meltdown he is unsure of having an elective procedure. Called patient back to communicate information from Dr. Suazo and the Significant other said she would have him give us a call back.  "

## 2019-01-03 NOTE — OR NURSING
"1230 Patient called back to procedure center. States, \"I'm unsure what's going on?\" I explained that he was do in to the procedure center 11:50. Pt. Verbalized \"I feel like I'm having an anxiety attack.\" I gently said, we're recommending follow up with primary doctor. He feels Anxious and \"Real stressed out.\" Patient late to facility and last time scheduled was a no show. Several calls to patient no answer and no call back. Re-explained we called patient out of concern and that Dr. Suazo recommends him to follow up as soon as possible with primary and to get assistance with anxiety. Patient's significant other verbalizes that their under a lot of stress with finances. Offered to have them talk with our financial service department. \"No, he's on MA and the hospital bills are paid for its other things.\" patient canceling procedure, feeling to anxious.  "

## 2019-03-18 ENCOUNTER — APPOINTMENT (OUTPATIENT)
Dept: GENERAL RADIOLOGY | Facility: CLINIC | Age: 54
End: 2019-03-18
Attending: FAMILY MEDICINE
Payer: COMMERCIAL

## 2019-03-18 ENCOUNTER — HOSPITAL ENCOUNTER (OUTPATIENT)
Facility: CLINIC | Age: 54
Setting detail: OBSERVATION
Discharge: HOME | End: 2019-03-19
Attending: FAMILY MEDICINE | Admitting: FAMILY MEDICINE
Payer: COMMERCIAL

## 2019-03-18 DIAGNOSIS — R20.8 DECREASED SENSATION: ICD-10-CM

## 2019-03-18 DIAGNOSIS — S80.252A FOREIGN BODY OF KNEE, LEFT, INITIAL ENCOUNTER: ICD-10-CM

## 2019-03-18 DIAGNOSIS — S81.042A PUNCTURE WOUND OF LEFT KNEE WITH FOREIGN BODY, INITIAL ENCOUNTER: ICD-10-CM
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
## 2019-03-18 PROCEDURE — 85027 COMPLETE CBC AUTOMATED: CPT | Performed by: FAMILY MEDICINE

## 2019-03-18 PROCEDURE — 99285 EMERGENCY DEPT VISIT HI MDM: CPT | Mod: Z6 | Performed by: FAMILY MEDICINE

## 2019-03-18 PROCEDURE — 71045 X-RAY EXAM CHEST 1 VIEW: CPT | Mod: TC

## 2019-03-18 PROCEDURE — 99285 EMERGENCY DEPT VISIT HI MDM: CPT | Mod: 25 | Performed by: FAMILY MEDICINE

## 2019-03-18 PROCEDURE — 96375 TX/PRO/DX INJ NEW DRUG ADDON: CPT | Performed by: FAMILY MEDICINE

## 2019-03-18 PROCEDURE — 96374 THER/PROPH/DIAG INJ IV PUSH: CPT | Performed by: FAMILY MEDICINE

## 2019-03-18 PROCEDURE — 73562 X-RAY EXAM OF KNEE 3: CPT | Mod: TC,LT

## 2019-03-18 PROCEDURE — 25000128 H RX IP 250 OP 636: Performed by: FAMILY MEDICINE

## 2019-03-18 PROCEDURE — 99219 ZZC INITIAL OBSERVATION CARE,LEVL II: CPT | Mod: AI | Performed by: FAMILY MEDICINE

## 2019-03-18 PROCEDURE — 96376 TX/PRO/DX INJ SAME DRUG ADON: CPT | Performed by: FAMILY MEDICINE

## 2019-03-18 RX ORDER — CEFAZOLIN SODIUM 2 G/100ML
2 INJECTION, SOLUTION INTRAVENOUS EVERY 6 HOURS
Status: DISCONTINUED | OUTPATIENT
Start: 2019-03-18 | End: 2019-03-19 | Stop reason: HOSPADM

## 2019-03-18 RX ORDER — FENTANYL CITRATE 50 UG/ML
100 INJECTION, SOLUTION INTRAMUSCULAR; INTRAVENOUS ONCE
Status: COMPLETED | OUTPATIENT
Start: 2019-03-18 | End: 2019-03-18

## 2019-03-18 RX ORDER — LORAZEPAM 2 MG/ML
1 INJECTION INTRAMUSCULAR ONCE
Status: COMPLETED | OUTPATIENT
Start: 2019-03-18 | End: 2019-03-18

## 2019-03-18 RX ORDER — FENTANYL CITRATE 50 UG/ML
50 INJECTION, SOLUTION INTRAMUSCULAR; INTRAVENOUS ONCE
Status: COMPLETED | OUTPATIENT
Start: 2019-03-18 | End: 2019-03-18

## 2019-03-18 RX ADMIN — CEFAZOLIN SODIUM 2 G: 2 INJECTION, SOLUTION INTRAVENOUS at 23:47

## 2019-03-18 RX ADMIN — FENTANYL CITRATE 100 MCG: 50 INJECTION INTRAMUSCULAR; INTRAVENOUS at 23:21

## 2019-03-18 RX ADMIN — FENTANYL CITRATE 50 MCG: 50 INJECTION INTRAMUSCULAR; INTRAVENOUS at 22:12

## 2019-03-18 RX ADMIN — LORAZEPAM 1 MG: 2 INJECTION INTRAMUSCULAR; INTRAVENOUS at 22:51

## 2019-03-18 ASSESSMENT — MIFFLIN-ST. JEOR: SCORE: 1556.14

## 2019-03-19 ENCOUNTER — ANESTHESIA EVENT (OUTPATIENT)
Dept: SURGERY | Facility: CLINIC | Age: 54
End: 2019-03-19
Payer: COMMERCIAL

## 2019-03-19 ENCOUNTER — APPOINTMENT (OUTPATIENT)
Dept: GENERAL RADIOLOGY | Facility: CLINIC | Age: 54
End: 2019-03-19
Attending: ORTHOPAEDIC SURGERY
Payer: COMMERCIAL

## 2019-03-19 ENCOUNTER — ANESTHESIA (OUTPATIENT)
Dept: SURGERY | Facility: CLINIC | Age: 54
End: 2019-03-19
Payer: COMMERCIAL

## 2019-03-19 VITALS
DIASTOLIC BLOOD PRESSURE: 79 MMHG | OXYGEN SATURATION: 96 % | WEIGHT: 161.38 LBS | BODY MASS INDEX: 23.9 KG/M2 | HEART RATE: 189 BPM | RESPIRATION RATE: 14 BRPM | TEMPERATURE: 97.5 F | HEIGHT: 69 IN | SYSTOLIC BLOOD PRESSURE: 92 MMHG

## 2019-03-19 PROBLEM — S80.252A: Status: ACTIVE | Noted: 2019-03-19

## 2019-03-19 LAB
ANION GAP SERPL CALCULATED.3IONS-SCNC: 10 MMOL/L (ref 3–14)
BUN SERPL-MCNC: 26 MG/DL (ref 7–30)
CALCIUM SERPL-MCNC: 8.2 MG/DL (ref 8.5–10.1)
CHLORIDE SERPL-SCNC: 107 MMOL/L (ref 94–109)
CO2 SERPL-SCNC: 24 MMOL/L (ref 20–32)
CREAT SERPL-MCNC: 0.72 MG/DL (ref 0.66–1.25)
ERYTHROCYTE [DISTWIDTH] IN BLOOD BY AUTOMATED COUNT: 13.1 % (ref 10–15)
GFR SERPL CREATININE-BSD FRML MDRD: >90 ML/MIN/{1.73_M2}
GLUCOSE SERPL-MCNC: 139 MG/DL (ref 70–99)
HCT VFR BLD AUTO: 40 % (ref 40–53)
HGB BLD-MCNC: 13.4 G/DL (ref 13.3–17.7)
MCH RBC QN AUTO: 30.7 PG (ref 26.5–33)
MCHC RBC AUTO-ENTMCNC: 33.5 G/DL (ref 31.5–36.5)
MCV RBC AUTO: 92 FL (ref 78–100)
PLATELET # BLD AUTO: 258 10E9/L (ref 150–450)
POTASSIUM SERPL-SCNC: 4.1 MMOL/L (ref 3.4–5.3)
RBC # BLD AUTO: 4.36 10E12/L (ref 4.4–5.9)
SODIUM SERPL-SCNC: 141 MMOL/L (ref 133–144)
WBC # BLD AUTO: 8.9 10E9/L (ref 4–11)

## 2019-03-19 PROCEDURE — 96375 TX/PRO/DX INJ NEW DRUG ADDON: CPT | Mod: 59

## 2019-03-19 PROCEDURE — 25800030 ZZH RX IP 258 OP 636: Performed by: FAMILY MEDICINE

## 2019-03-19 PROCEDURE — 25800030 ZZH RX IP 258 OP 636: Performed by: NURSE ANESTHETIST, CERTIFIED REGISTERED

## 2019-03-19 PROCEDURE — 96376 TX/PRO/DX INJ SAME DRUG ADON: CPT | Mod: 59

## 2019-03-19 PROCEDURE — 25000128 H RX IP 250 OP 636: Performed by: NURSE ANESTHETIST, CERTIFIED REGISTERED

## 2019-03-19 PROCEDURE — G0378 HOSPITAL OBSERVATION PER HR: HCPCS

## 2019-03-19 PROCEDURE — 25000132 ZZH RX MED GY IP 250 OP 250 PS 637: Performed by: FAMILY MEDICINE

## 2019-03-19 PROCEDURE — 37000008 ZZH ANESTHESIA TECHNICAL FEE, 1ST 30 MIN: Performed by: ORTHOPAEDIC SURGERY

## 2019-03-19 PROCEDURE — 96366 THER/PROPH/DIAG IV INF ADDON: CPT | Mod: 59

## 2019-03-19 PROCEDURE — 27310 EXPLORATION OF KNEE JOINT: CPT | Mod: LT | Performed by: ORTHOPAEDIC SURGERY

## 2019-03-19 PROCEDURE — 71000014 ZZH RECOVERY PHASE 1 LEVEL 2 FIRST HR: Performed by: ORTHOPAEDIC SURGERY

## 2019-03-19 PROCEDURE — 25000125 ZZHC RX 250: Performed by: NURSE ANESTHETIST, CERTIFIED REGISTERED

## 2019-03-19 PROCEDURE — 80048 BASIC METABOLIC PNL TOTAL CA: CPT | Performed by: FAMILY MEDICINE

## 2019-03-19 PROCEDURE — 37000009 ZZH ANESTHESIA TECHNICAL FEE, EACH ADDTL 15 MIN: Performed by: ORTHOPAEDIC SURGERY

## 2019-03-19 PROCEDURE — 36000058 ZZH SURGERY LEVEL 3 EA 15 ADDTL MIN: Performed by: ORTHOPAEDIC SURGERY

## 2019-03-19 PROCEDURE — 99232 SBSQ HOSP IP/OBS MODERATE 35: CPT | Mod: 57 | Performed by: ORTHOPAEDIC SURGERY

## 2019-03-19 PROCEDURE — 25000128 H RX IP 250 OP 636: Performed by: ORTHOPAEDIC SURGERY

## 2019-03-19 PROCEDURE — 36415 COLL VENOUS BLD VENIPUNCTURE: CPT | Performed by: FAMILY MEDICINE

## 2019-03-19 PROCEDURE — 36000060 ZZH SURGERY LEVEL 3 W FLUORO 1ST 30 MIN: Performed by: ORTHOPAEDIC SURGERY

## 2019-03-19 PROCEDURE — 40000277 XR SURGERY CARM FLUORO LESS THAN 5 MIN W STILLS

## 2019-03-19 PROCEDURE — 25000128 H RX IP 250 OP 636: Performed by: FAMILY MEDICINE

## 2019-03-19 PROCEDURE — 96365 THER/PROPH/DIAG IV INF INIT: CPT | Mod: 59 | Performed by: FAMILY MEDICINE

## 2019-03-19 PROCEDURE — 25000566 ZZH SEVOFLURANE, EA 15 MIN: Performed by: ORTHOPAEDIC SURGERY

## 2019-03-19 PROCEDURE — 71000027 ZZH RECOVERY PHASE 2 EACH 15 MINS: Performed by: ORTHOPAEDIC SURGERY

## 2019-03-19 PROCEDURE — 25000132 ZZH RX MED GY IP 250 OP 250 PS 637: Performed by: ORTHOPAEDIC SURGERY

## 2019-03-19 RX ORDER — LORAZEPAM 2 MG/ML
0.5 INJECTION INTRAMUSCULAR EVERY 4 HOURS PRN
Status: DISCONTINUED | OUTPATIENT
Start: 2019-03-19 | End: 2019-03-19 | Stop reason: HOSPADM

## 2019-03-19 RX ORDER — DIMENHYDRINATE 50 MG/ML
25 INJECTION, SOLUTION INTRAMUSCULAR; INTRAVENOUS
Status: DISCONTINUED | OUTPATIENT
Start: 2019-03-19 | End: 2019-03-19 | Stop reason: HOSPADM

## 2019-03-19 RX ORDER — ONDANSETRON 4 MG/1
4 TABLET, ORALLY DISINTEGRATING ORAL EVERY 6 HOURS PRN
Status: DISCONTINUED | OUTPATIENT
Start: 2019-03-19 | End: 2019-03-19 | Stop reason: HOSPADM

## 2019-03-19 RX ORDER — ACETAMINOPHEN 650 MG/1
650 SUPPOSITORY RECTAL EVERY 4 HOURS PRN
Status: DISCONTINUED | OUTPATIENT
Start: 2019-03-19 | End: 2019-03-19 | Stop reason: HOSPADM

## 2019-03-19 RX ORDER — OXYCODONE HYDROCHLORIDE 5 MG/1
5-10 TABLET ORAL
Status: DISCONTINUED | OUTPATIENT
Start: 2019-03-19 | End: 2019-03-19 | Stop reason: HOSPADM

## 2019-03-19 RX ORDER — HYDROMORPHONE HYDROCHLORIDE 1 MG/ML
.3-.5 INJECTION, SOLUTION INTRAMUSCULAR; INTRAVENOUS; SUBCUTANEOUS EVERY 10 MIN PRN
Status: DISCONTINUED | OUTPATIENT
Start: 2019-03-19 | End: 2019-03-19 | Stop reason: HOSPADM

## 2019-03-19 RX ORDER — ONDANSETRON 2 MG/ML
INJECTION INTRAMUSCULAR; INTRAVENOUS PRN
Status: DISCONTINUED | OUTPATIENT
Start: 2019-03-19 | End: 2019-03-19

## 2019-03-19 RX ORDER — SODIUM CHLORIDE 9 MG/ML
INJECTION, SOLUTION INTRAVENOUS CONTINUOUS
Status: DISCONTINUED | OUTPATIENT
Start: 2019-03-19 | End: 2019-03-19 | Stop reason: HOSPADM

## 2019-03-19 RX ORDER — FENTANYL CITRATE 50 UG/ML
25-50 INJECTION, SOLUTION INTRAMUSCULAR; INTRAVENOUS
Status: DISCONTINUED | OUTPATIENT
Start: 2019-03-19 | End: 2019-03-19 | Stop reason: HOSPADM

## 2019-03-19 RX ORDER — SODIUM CHLORIDE, SODIUM LACTATE, POTASSIUM CHLORIDE, CALCIUM CHLORIDE 600; 310; 30; 20 MG/100ML; MG/100ML; MG/100ML; MG/100ML
INJECTION, SOLUTION INTRAVENOUS CONTINUOUS
Status: DISCONTINUED | OUTPATIENT
Start: 2019-03-19 | End: 2019-03-19 | Stop reason: HOSPADM

## 2019-03-19 RX ORDER — ACETAMINOPHEN 325 MG/1
650 TABLET ORAL EVERY 4 HOURS PRN
Status: DISCONTINUED | OUTPATIENT
Start: 2019-03-19 | End: 2019-03-19 | Stop reason: HOSPADM

## 2019-03-19 RX ORDER — MEPERIDINE HYDROCHLORIDE 25 MG/ML
12.5 INJECTION INTRAMUSCULAR; INTRAVENOUS; SUBCUTANEOUS
Status: DISCONTINUED | OUTPATIENT
Start: 2019-03-19 | End: 2019-03-19 | Stop reason: HOSPADM

## 2019-03-19 RX ORDER — LIDOCAINE HYDROCHLORIDE 20 MG/ML
INJECTION, SOLUTION INFILTRATION; PERINEURAL PRN
Status: DISCONTINUED | OUTPATIENT
Start: 2019-03-19 | End: 2019-03-19

## 2019-03-19 RX ORDER — KETOROLAC TROMETHAMINE 30 MG/ML
INJECTION, SOLUTION INTRAMUSCULAR; INTRAVENOUS PRN
Status: DISCONTINUED | OUTPATIENT
Start: 2019-03-19 | End: 2019-03-19

## 2019-03-19 RX ORDER — ONDANSETRON 2 MG/ML
4 INJECTION INTRAMUSCULAR; INTRAVENOUS EVERY 6 HOURS PRN
Status: DISCONTINUED | OUTPATIENT
Start: 2019-03-19 | End: 2019-03-19 | Stop reason: HOSPADM

## 2019-03-19 RX ORDER — HYDROCODONE BITARTRATE AND ACETAMINOPHEN 5; 325 MG/1; MG/1
1-2 TABLET ORAL EVERY 6 HOURS PRN
Status: DISCONTINUED | OUTPATIENT
Start: 2019-03-19 | End: 2019-03-19 | Stop reason: HOSPADM

## 2019-03-19 RX ORDER — ONDANSETRON 2 MG/ML
4 INJECTION INTRAMUSCULAR; INTRAVENOUS EVERY 30 MIN PRN
Status: DISCONTINUED | OUTPATIENT
Start: 2019-03-19 | End: 2019-03-19 | Stop reason: HOSPADM

## 2019-03-19 RX ORDER — LIDOCAINE 40 MG/G
CREAM TOPICAL
Status: DISCONTINUED | OUTPATIENT
Start: 2019-03-19 | End: 2019-03-19 | Stop reason: HOSPADM

## 2019-03-19 RX ORDER — NALOXONE HYDROCHLORIDE 0.4 MG/ML
.1-.4 INJECTION, SOLUTION INTRAMUSCULAR; INTRAVENOUS; SUBCUTANEOUS
Status: DISCONTINUED | OUTPATIENT
Start: 2019-03-19 | End: 2019-03-19 | Stop reason: HOSPADM

## 2019-03-19 RX ORDER — FENTANYL CITRATE 50 UG/ML
INJECTION, SOLUTION INTRAMUSCULAR; INTRAVENOUS PRN
Status: DISCONTINUED | OUTPATIENT
Start: 2019-03-19 | End: 2019-03-19

## 2019-03-19 RX ORDER — PROPOFOL 10 MG/ML
INJECTION, EMULSION INTRAVENOUS PRN
Status: DISCONTINUED | OUTPATIENT
Start: 2019-03-19 | End: 2019-03-19

## 2019-03-19 RX ORDER — ONDANSETRON 4 MG/1
4 TABLET, ORALLY DISINTEGRATING ORAL EVERY 30 MIN PRN
Status: DISCONTINUED | OUTPATIENT
Start: 2019-03-19 | End: 2019-03-19 | Stop reason: HOSPADM

## 2019-03-19 RX ORDER — HYDROCODONE BITARTRATE AND ACETAMINOPHEN 5; 325 MG/1; MG/1
1-2 TABLET ORAL EVERY 4 HOURS PRN
Qty: 15 TABLET | Refills: 0 | Status: SHIPPED | OUTPATIENT
Start: 2019-03-19 | End: 2019-05-06

## 2019-03-19 RX ORDER — HYDROMORPHONE HYDROCHLORIDE 1 MG/ML
.3-.5 INJECTION, SOLUTION INTRAMUSCULAR; INTRAVENOUS; SUBCUTANEOUS
Status: DISCONTINUED | OUTPATIENT
Start: 2019-03-19 | End: 2019-03-19 | Stop reason: HOSPADM

## 2019-03-19 RX ORDER — ROPINIROLE 0.25 MG/1
0.75 TABLET, FILM COATED ORAL 3 TIMES DAILY
Status: DISCONTINUED | OUTPATIENT
Start: 2019-03-19 | End: 2019-03-19 | Stop reason: HOSPADM

## 2019-03-19 RX ORDER — NICOTINE 21 MG/24HR
1 PATCH, TRANSDERMAL 24 HOURS TRANSDERMAL DAILY
Status: DISCONTINUED | OUTPATIENT
Start: 2019-03-19 | End: 2019-03-19 | Stop reason: HOSPADM

## 2019-03-19 RX ADMIN — OXYCODONE HYDROCHLORIDE 5 MG: 5 TABLET ORAL at 13:33

## 2019-03-19 RX ADMIN — KETOROLAC TROMETHAMINE 30 MG: 30 INJECTION, SOLUTION INTRAMUSCULAR at 18:55

## 2019-03-19 RX ADMIN — SODIUM CHLORIDE: 9 INJECTION, SOLUTION INTRAVENOUS at 11:13

## 2019-03-19 RX ADMIN — NICOTINE 1 PATCH: 21 PATCH TRANSDERMAL at 08:15

## 2019-03-19 RX ADMIN — SODIUM CHLORIDE: 9 INJECTION, SOLUTION INTRAVENOUS at 01:20

## 2019-03-19 RX ADMIN — Medication 0.5 MG: at 01:06

## 2019-03-19 RX ADMIN — Medication 0.5 MG: at 10:03

## 2019-03-19 RX ADMIN — CEFAZOLIN SODIUM 2 G: 2 INJECTION, SOLUTION INTRAVENOUS at 16:25

## 2019-03-19 RX ADMIN — Medication 0.5 MG: at 14:03

## 2019-03-19 RX ADMIN — Medication 0.5 MG: at 19:22

## 2019-03-19 RX ADMIN — FENTANYL CITRATE 50 MCG: 50 INJECTION, SOLUTION INTRAMUSCULAR; INTRAVENOUS at 18:11

## 2019-03-19 RX ADMIN — PROPOFOL 200 MG: 10 INJECTION, EMULSION INTRAVENOUS at 18:25

## 2019-03-19 RX ADMIN — ROPINIROLE HYDROCHLORIDE 0.75 MG: 0.25 TABLET, FILM COATED ORAL at 08:15

## 2019-03-19 RX ADMIN — SODIUM CHLORIDE: 9 INJECTION, SOLUTION INTRAVENOUS at 19:00

## 2019-03-19 RX ADMIN — NICOTINE 1 PATCH: 21 PATCH TRANSDERMAL at 01:06

## 2019-03-19 RX ADMIN — Medication 0.5 MG: at 08:15

## 2019-03-19 RX ADMIN — Medication 0.5 MG: at 05:23

## 2019-03-19 RX ADMIN — PHENYLEPHRINE HYDROCHLORIDE 100 MCG: 10 INJECTION, SOLUTION INTRAMUSCULAR; INTRAVENOUS; SUBCUTANEOUS at 18:35

## 2019-03-19 RX ADMIN — CEFAZOLIN SODIUM 2 G: 2 INJECTION, SOLUTION INTRAVENOUS at 11:13

## 2019-03-19 RX ADMIN — CEFAZOLIN SODIUM 2 G: 2 INJECTION, SOLUTION INTRAVENOUS at 05:24

## 2019-03-19 RX ADMIN — Medication 0.5 MG: at 16:25

## 2019-03-19 RX ADMIN — Medication 0.5 MG: at 11:51

## 2019-03-19 RX ADMIN — FENTANYL CITRATE 25 MCG: 50 INJECTION INTRAMUSCULAR; INTRAVENOUS at 19:47

## 2019-03-19 RX ADMIN — ROPINIROLE HYDROCHLORIDE 0.75 MG: 0.25 TABLET, FILM COATED ORAL at 14:03

## 2019-03-19 RX ADMIN — ONDANSETRON 4 MG: 2 INJECTION INTRAMUSCULAR; INTRAVENOUS at 18:53

## 2019-03-19 RX ADMIN — FENTANYL CITRATE 50 MCG: 50 INJECTION, SOLUTION INTRAMUSCULAR; INTRAVENOUS at 18:10

## 2019-03-19 RX ADMIN — MIDAZOLAM 2 MG: 1 INJECTION INTRAMUSCULAR; INTRAVENOUS at 18:09

## 2019-03-19 RX ADMIN — FENTANYL CITRATE 25 MCG: 50 INJECTION INTRAMUSCULAR; INTRAVENOUS at 19:23

## 2019-03-19 RX ADMIN — HYDROCODONE BITARTRATE AND ACETAMINOPHEN 2 TABLET: 5; 325 TABLET ORAL at 20:30

## 2019-03-19 RX ADMIN — LORAZEPAM 0.5 MG: 2 INJECTION, SOLUTION INTRAMUSCULAR; INTRAVENOUS at 12:13

## 2019-03-19 RX ADMIN — LIDOCAINE HYDROCHLORIDE 40 MG: 20 INJECTION, SOLUTION INFILTRATION; PERINEURAL at 18:25

## 2019-03-19 ASSESSMENT — LIFESTYLE VARIABLES: TOBACCO_USE: 1

## 2019-03-19 ASSESSMENT — MIFFLIN-ST. JEOR: SCORE: 1562.38

## 2019-03-19 NOTE — PROGRESS NOTES
S- Transfer to OR from [facility].    B- Nail in Knee    A- Brief systems assessment: Pt having left knee pain.  VSS. Afebrile.  Having knee surgery at this time.    R- Transfer to OR per physician orders. Continue to monitor pt and update physician as needed.      Code status: Full Code  Skin: No skin issues  Fall Risk: Yes- Department fall risk interventions implemented. and No  Isolation: None  Core Measures: No  Medication drips upon transfer:

## 2019-03-19 NOTE — ANESTHESIA PREPROCEDURE EVALUATION
Anesthesia Pre-Procedure Evaluation    Patient: Alejandro Alves   MRN: [id number] : 1965          Preoperative Diagnosis: foreign body left knee    Procedure(s):  FOREIGN BODY REMOVAL LEFT KNEE    History reviewed. No pertinent past medical history.  Past Surgical History:   Procedure Laterality Date     COLONOSCOPY N/A 2016    Procedure: COMBINED COLONOSCOPY, SINGLE OR MULTIPLE BIOPSY/POLYPECTOMY BY BIOPSY;  Surgeon: Theron Vigil MD;  Location:  GI       Anesthesia Evaluation     . Pt has had prior anesthetic. Type: MAC and General    No history of anesthetic complications          ROS/MED HX    ENT/Pulmonary:     (+)tobacco use, Current use , . .    Neurologic:  - neg neurologic ROS     Cardiovascular:  - neg cardiovascular ROS   (+) ----. : . . . :. . Previous cardiac testing date:results:date: results:ECG reviewed date:3/18/19 results:SR date: results:          METS/Exercise Tolerance:     Hematologic:  - neg hematologic  ROS       Musculoskeletal:   (+) , , other musculoskeletal- Left knee pain- FB      GI/Hepatic:  - neg GI/hepatic ROS       Renal/Genitourinary:  - ROS Renal section negative       Endo:  - neg endo ROS       Psychiatric:  - neg psychiatric ROS       Infectious Disease:  - neg infectious disease ROS       Malignancy:      - no malignancy   Other: Comment: H/O cocaine use   - neg other ROS                      Physical Exam  Normal systems: cardiovascular, pulmonary and dental    Airway   Mallampati: II  TM distance: >3 FB  Neck ROM: full    Dental     Cardiovascular   Rhythm and rate: regular and normal      Pulmonary    breath sounds clear to auscultation            Lab Results   Component Value Date    WBC 8.9 2019    HGB 13.4 2019    HCT 40.0 2019     2019     2019    POTASSIUM 4.1 2019    CHLORIDE 107 2019    CO2 24 2019    BUN 26 2019    CR 0.72 2019     (H) 2019    SUJEY  "8.2 (L) 03/19/2019    ALBUMIN 4.0 04/20/2016    PROTTOTAL 7.0 04/20/2016    ALT 56 04/20/2016    AST 22 04/20/2016    ALKPHOS 67 04/20/2016    BILITOTAL 0.4 04/20/2016    LIPASE 129 04/15/2015       Preop Vitals  BP Readings from Last 3 Encounters:   03/19/19 115/71   11/26/18 112/62   11/16/18 106/64    Pulse Readings from Last 3 Encounters:   03/19/19 59   11/26/18 86   11/16/18 99      Resp Readings from Last 3 Encounters:   03/19/19 16   11/26/18 16   08/27/18 20    SpO2 Readings from Last 3 Encounters:   03/19/19 99%   11/16/18 96%   11/09/18 96%      Temp Readings from Last 1 Encounters:   03/19/19 97.1  F (36.2  C) (Oral)    Ht Readings from Last 1 Encounters:   03/18/19 1.753 m (5' 9\")      Wt Readings from Last 1 Encounters:   03/19/19 73.2 kg (161 lb 6 oz)    Estimated body mass index is 23.83 kg/m  as calculated from the following:    Height as of this encounter: 1.753 m (5' 9\").    Weight as of this encounter: 73.2 kg (161 lb 6 oz).       Anesthesia Plan      History & Physical Review  History and physical reviewed and following examination; no interval change.    ASA Status:  2 .    NPO Status:  > 8 hours    Plan for General and LMA with Intravenous and Propofol induction. Maintenance will be Balanced.    PONV prophylaxis:  Ondansetron (or other 5HT-3)       Postoperative Care  Postoperative pain management:  IV analgesics and Oral pain medications.      Consents  Anesthetic plan, risks, benefits and alternatives discussed with:  Patient.  Use of blood products discussed: No .   .                 JOSELINE Colin CRNA  "

## 2019-03-19 NOTE — PLAN OF CARE
S-(situation): shift note    B-(background): Nail in knee    A-(assessment): Pt is A&O.  VSS.  Afebrile.  Repositioned per pt's request.  Pain in left knee, elevated on pillow.  Dilaudid given for his pain and this has helped along with ativan.  Having muscle spasms.  CMS intact - good pulses.      R-(recommendations): Will cont to monitor the above.

## 2019-03-19 NOTE — ED PROVIDER NOTES
History   No chief complaint on file.    The history is provided by the patient and the EMS personnel.     Alejandro Alves is a 53 year old male who presents to the emergency department via EMS for a trauma. Patient reports being at home working in his workshop with a nail gun when it went off and a 2 inch nail went into the top of his left knee. Unable to see the nail, however, you can see the hole where the nail went in. The bleeding is under control. He is unable to move his knee. When his muscles tighten up the pain is at a 10/10, when he is just laying there the pain is at a 7/10. Patient's last Tdap was 11/26/18.    He was using a nail gun and had finished and was trying to uncoupled from the air hose.  His hands have been weak for the past couple of weeks ever since suffering what he thought was frostbite.  Because of this, he had braced against his leg to undo the  when the gun fired.  He fell to the ground and tried to crawl over to his walkie-talkie to call for help.  That took about 10 minutes or so.  He thinks his knee was slightly flexed at the time of the injury.  He has severe pain with any kind of movement but especially with any attempt at flexion.  He was brought in by EMS.    States that his tetanus is up-to-date.  Otherwise in fairly good health.  Had a hernia repair just this past year.    Has pain in both hands and wrists especially at night.  Numbness in the median nerve distribution.  It starting to affect his lifestyle as he has difficulty picking things up.    Allergies:  No Known Allergies    Problem List:    Patient Active Problem List    Diagnosis Date Noted     Foreign body of knee, left, initial encounter 03/19/2019     Priority: Medium     Acute foreign body of left knee, initial encounter 03/18/2019     Priority: Medium     Subacromial impingement, right 02/09/2017     Priority: Medium     Superior glenoid labrum lesion of right shoulder, initial encounter 02/09/2017  "    Priority: Medium     Incomplete tear of right rotator cuff 02/09/2017     Priority: Medium     Restless legs syndrome (RLS) 04/20/2016     Priority: Medium     Hip pain, right 04/20/2016     Priority: Medium     Tobacco use disorder 04/11/2016     Priority: Medium        Past Medical History:    History reviewed. No pertinent past medical history.    Past Surgical History:    Past Surgical History:   Procedure Laterality Date     COLONOSCOPY N/A 6/6/2016    Procedure: COMBINED COLONOSCOPY, SINGLE OR MULTIPLE BIOPSY/POLYPECTOMY BY BIOPSY;  Surgeon: Theron Vigil MD;  Location:  GI       Family History:    Family History   Problem Relation Age of Onset     Hypertension Father      Heart Disease Father      Heart Disease Paternal Grandfather        Social History:  Marital Status:  Single [1]  Social History     Tobacco Use     Smoking status: Current Every Day Smoker     Packs/day: 1.00     Smokeless tobacco: Never Used     Tobacco comment: patient states he would like to    Substance Use Topics     Alcohol use: No     Drug use: Yes     Types: Cocaine     Comment: minimal        Medications:      No current outpatient medications on file.      Review of Systems   All other systems reviewed and are negative.      Physical Exam   BP: 116/82  Pulse: 75  Heart Rate: 84  Temp: 96.5  F (35.8  C)  Resp: 19  Height: 175.3 cm (5' 9\")  Weight: 72.6 kg (160 lb)  SpO2: 97 %      Physical Exam   Constitutional: He is oriented to person, place, and time. He appears well-developed and well-nourished. He appears distressed (mild at rest, mod/sev with any movement).   HENT:   Head: Normocephalic and atraumatic.   Eyes: EOM are normal.   Neck: Neck supple.   Cardiovascular: Normal rate, regular rhythm and intact distal pulses.   Pulmonary/Chest: Effort normal and breath sounds normal.   Musculoskeletal:        Left knee: He exhibits decreased range of motion and swelling ( around entrance wound).        " Legs:  Neurological: He is alert and oriented to person, place, and time. A sensory deficit ( Decreased sensation in the median nerve distribution of both hands.  Suspect bilateral carpal tunnel syndrome.) is present.   Skin: Skin is warm and dry.   Psychiatric: He has a normal mood and affect.   Nursing note and vitals reviewed.      ED Course  (with Medical Decision Making)    53-year-old gentleman suffered a nail gun injury to the left leg just above the knee on the lateral aspect.  The nail is totally embedded and there is a small entrance wound is not bleeding.  X-ray shows greater than 90 degree bend in the nail.  It appears to involve bone and likely the patellofemoral joint.    Spoke with Dr. Watkins, on-call for Orth [facility].  He reviewed the x-rays and will plan on someone operating on him tomorrow.  If Dr. Walker or larger available that could happen in the morning, if they are not, he will come up and do it later in the day.  In the meantime his tetanus is up-to-date.  He was started on Ancef 2 g IV every 6 hours and did receive a couple of doses of fentanyl along with Ativan for pain.  I spoke with Dr. Marin, the hospitalist on call.  He has assumed his care and wrote orders.    I suspect he has bilateral carpal tunnel syndrome which can be worked up further in the clinic.            Procedures               Critical Care time:  none               Results for orders placed or performed during the hospital encounter of 03/18/19 (from the past 24 hour(s))   XR Knee Left 3 Views    Narrative    XR LEFT KNEE THREE VIEWS   3/18/2019 10:31 PM     HISTORY: Nail gun staple in left knee.      Impression    IMPRESSION: There is a bent nail foreign body. This could be located  within the patellofemoral joint space laterally, or within the lateral  trochlea (anterior portion of the lateral femoral condyle).    ELISSA DEL ROSARIO MD   CBC with platelets   Result Value Ref Range    WBC 8.9 4.0 - 11.0 10e9/L    RBC Count 4.36 (L)  4.4 - 5.9 10e12/L    Hemoglobin 13.4 13.3 - 17.7 g/dL    Hematocrit 40.0 40.0 - 53.0 %    MCV 92 78 - 100 fl    MCH 30.7 26.5 - 33.0 pg    MCHC 33.5 31.5 - 36.5 g/dL    RDW 13.1 10.0 - 15.0 %    Platelet Count 258 150 - 450 10e9/L   XR Chest Port 1 View    Narrative    XR CHEST PORT 1 VW  3/19/2019 12:01 AM     INDICATION: Preop.    COMPARISON: 11/26/2018.      Impression    IMPRESSION: No infiltrates or other acute findings. Heart size is  within normal limits.    PAUL HEAD MD   Basic metabolic panel   Result Value Ref Range    Sodium 141 133 - 144 mmol/L    Potassium 4.1 3.4 - 5.3 mmol/L    Chloride 107 94 - 109 mmol/L    Carbon Dioxide 24 20 - 32 mmol/L    Anion Gap 10 3 - 14 mmol/L    Glucose 139 (H) 70 - 99 mg/dL    Urea Nitrogen 26 7 - 30 mg/dL    Creatinine 0.72 0.66 - 1.25 mg/dL    GFR Estimate >90 >60 mL/min/[1.73_m2]    GFR Estimate If Black >90 >60 mL/min/[1.73_m2]    Calcium 8.2 (L) 8.5 - 10.1 mg/dL         Assessments & Plan      I have reviewed the nursing notes.    I have reviewed the findings, diagnosis, plan and need for follow up with the patient.       ED to Inpatient Handoff:    Discussed with Dr Marin  at 11:11 PM   Patient accepted for Observation Stay  Pending studies include none  Code Status: Not Addressed              Medication List      There are no discharge medications for this visit.         Final diagnoses:   Foreign body of knee, left, initial encounter - nail into bone/joint     This document serves as a record of services personally performed by Del Colon MD. It was created on their behalf by Donna Bowers, a trained medical scribe. The creation of this record is based on the provider's personal observations and the statements of the patient. This document has been checked and approved by the attending provider.    Note: Chart documentation done in part with Dragon Voice Recognition software. Although reviewed after completion, some word and grammatical errors may  remain.    3/18/2019   [facility] EMERGENCY DEPARTMENT     Cornelio Colon MD  03/19/19 0143

## 2019-03-19 NOTE — CONSULTS
[facility] Orthopedic Consultation    Alejandro Alves MRN# [id number]   Age: 53 year old YOB: 1965     Date of Admission:  3/18/2019    Reason for consult: Nail gun injury to left knee.       Requesting physician: Dr Marin       Level of consult: Consult, follow and place orders           Assessment and Plan:   Assessment:   Nail gun injury to left knee with penetration of lateral quadriceps and into distal femur.   The nail is bent at the level of the patella.  The nail protrudes through the femur back into the joint.      Plan:   Removal of nail from left femur and knee.  I+D.            Chief Complaint:   Nail gun injury to left knee.       History is obtained from the patient         History of Present Illness:   This patient is a 53 year old male who presents with the following condition requiring a hospital admission:      He was working on a home project last night.  Due to recent frostbite his fingers do not work well.  He could not uncouple the airgun.  He placed it against his thigh for leverage, but it fired a nail into his left femur.  X-ray shows the nail imbedded in the distal femur with penetration through into the joint.           Past Medical History:   History reviewed. No pertinent past medical history.          Past Surgical History:     Past Surgical History:   Procedure Laterality Date     COLONOSCOPY N/A 6/6/2016    Procedure: COMBINED COLONOSCOPY, SINGLE OR MULTIPLE BIOPSY/POLYPECTOMY BY BIOPSY;  Surgeon: Theron Vigil MD;  Location:  GI             Social History:     Social History     Tobacco Use     Smoking status: Current Every Day Smoker     Packs/day: 1.00     Smokeless tobacco: Never Used     Tobacco comment: patient states he would like to    Substance Use Topics     Alcohol use: No             Family History:     Family History   Problem Relation Age of Onset     Hypertension Father      Heart Disease Father      Heart Disease  Paternal Grandfather      Family history reviewed          Immunizations:     Immunization History   Administered Date(s) Administered     Influenza Quad, Recombinant, p-free (RIV4) 11/26/2018     Pneumo Conj 13-V (2010&after) 11/26/2018     TD (ADULT, 7+) 11/26/2018             Allergies:   No Known Allergies          Medications:     Medications Prior to Admission   Medication Sig Dispense Refill Last Dose     NAPROXEN PO Take 500 mg by mouth   3/18/2019 at 1400     rOPINIRole (REQUIP) 0.25 MG tablet Take 1 tab three times daily for 1 week, then 2 tabs three times daily for 1 week, then 3 tabs three times daily 189 tablet 3 3/18/2019 at 1400     aspirin-acetaminophen-caffeine (EXCEDRIN MIGRAINE) 250-250-65 MG per tablet Take 1 tablet by mouth every 6 hours as needed for headaches   Past Week at Unknown time     ibuprofen (ADVIL/MOTRIN) 600 MG tablet Take 1 tablet (600 mg) by mouth every 6 hours as needed for moderate pain 30 tablet 0 Past Week at Unknown time             Review of Systems:   The Review of Systems is negative other than noted in the HPI.  Main concern is the frostbite.          Physical Exam:   Temp: 97.1  F (36.2  C) Temp src: Oral BP: 115/71 Pulse: 59 Heart Rate: 58 Resp: 16 SpO2: 99 % O2 Device: None (Room air)    All vitals have been reviewed  Musculoskeletal:   LEFT KNEE:  Held in 70 degrees flexion.  Small puncture wound just lateral and superior to patella. Very tender in this area.  No warmth or erythema.  Sensation, motor and circulation are intact.             Data:   All laboratory data reviewed  Lab Results   Component Value Date    WBC 8.9 03/18/2019    HGB 13.4 03/18/2019    HCT 40.0 03/18/2019     03/18/2019     03/19/2019    POTASSIUM 4.1 03/19/2019    CHLORIDE 107 03/19/2019    CO2 24 03/19/2019    BUN 26 03/19/2019    CR 0.72 03/19/2019     (H) 03/19/2019    AST 22 04/20/2016    ALT 56 04/20/2016    ALKPHOS 67 04/20/2016    BILITOTAL 0.4 04/20/2016      Extremity x-ray of the left knee::   No fracture, effusions, masses or lesions  Hardware in lateral femur.  Bent nail.  Penetrates through bone into joint distally.        Attestation:  Amount of time performed on this consult: 20 minutes.    Bon Watkins MD

## 2019-03-19 NOTE — H&P
[facility]    History and Physical - Hospitalist Service       Date of Admission:  3/18/2019    Assessment & Plan   Alejandro Alves is a 53 year old male admitted on 3/18/2019. He presents after accidentally embedding a nail in his knee area this evening.  X-ray is showing an embedded nail in the left distal femur with exam not showing an exposed head of the nail.  Patient will be registered observation, orthopedic surgery is planning surgical removal of the foreign body tomorrow either in the morning or early afternoon.  Patient will be kept n.p.o. post midnight with IV fluids, IV narcotics for pain.  Patient is otherwise healthy and is medically cleared for surgery and anesthesia.  Lab work including BMP, CBC, chest x-ray and EKG have been ordered and will be reviewed later.    Acute foreign body of left knee, initial encounter  Accidentally shot a nail into his left knee at home  Imaging is showing an embedded nail of the left distal femur, no signs of exposed nail on the skin surface  Orthopedics has been consulted and they are planning a surgical removal tomorrow  [place] observation, n.p.o. post midnight, IV fluids with IV narcotics ordered for pain  Ancef will be given every 6 hours for infection at this time  Patient is up-to-date on his tetanus booster having received in December of last year    Tobacco use disorder  Smoking 1 pack cigarettes per day  Nicotine patch ordered    Restless legs syndrome (RLS)  Controlled, taking Requip 3 times daily  Continue Requip         Diet: Regular, n.p.o. after midnight  DVT Prophylaxis: Observation status  Van Catheter: not present  Code Status: Full code    Disposition Plan   Expected discharge: Tomorrow, recommended to prior living arrangement once Once the nail has been removed from his knee by orthopedic surgery.  Entered: Liam Marin MD 03/18/2019, 11:41 PM     The patient's care was discussed with the Patient and  Patient's wife.    Liam Marin MD  [facility]    ______________________________________________________________________    Chief Complaint   53-year-old male who accidentally embedded a nail in his knee area with a nail gun this evening    History is obtained from the patient, electronic health record, emergency department physician and patient's spouse    History of Present Illness   Alejandro Alves is a 53 year old male who accidentally shot a nail into his left knee this evening.  He was out in his workshop, attempting to remove the air hose from the nail gun when it actually went off and shot a nail into his left knee.  He had immediate pain and has been unable to bend or extend the knee since that time.  The head of the nail is underneath the skin.  Patient prior to this was feeling otherwise well.  He does smoke 1 pack cigarettes a day.  He complains of restless leg syndrome taking Requip and he has chronic carpal tunnel syndrome involving both wrists.  Does have a hernia in his left groin area that is to be fixed at some point, but he is been putting this off.  Prior to this day he was otherwise feeling well with no cold symptoms, denies any heart history denies any chest pain, shortness of breath, nausea or vomiting.  Last tetanus booster was in December of last year    Review of Systems    The 10 point Review of Systems is negative other than noted in the HPI or here.     Past Medical History    I have reviewed this patient's medical history and updated it with pertinent information if needed.   History reviewed. No pertinent past medical history.    Past Surgical History   I have reviewed this patient's surgical history and updated it with pertinent information if needed.  Past Surgical History:   Procedure Laterality Date     COLONOSCOPY N/A 6/6/2016    Procedure: COMBINED COLONOSCOPY, SINGLE OR MULTIPLE BIOPSY/POLYPECTOMY BY BIOPSY;  Surgeon: Musa  Theron Crockett MD;  Location:  GI       Social History   I have reviewed this patient's social history and updated it with pertinent information if needed.  Social History     Tobacco Use     Smoking status: Current Every Day Smoker     Packs/day: 1.00     Smokeless tobacco: Never Used     Tobacco comment: patient states he would like to    Substance Use Topics     Alcohol use: No     Drug use: Yes     Types: Cocaine     Comment: minimal       Family History   I have reviewed this patient's family history and updated it with pertinent information if needed.   Family History   Problem Relation Age of Onset     Hypertension Father      Heart Disease Father      Heart Disease Paternal Grandfather        Prior to Admission Medications   Prior to Admission Medications   Prescriptions Last Dose Informant Patient Reported? Taking?   NAPROXEN PO 3/18/2019 at 1400  Yes Yes   Sig: Take 500 mg by mouth   aspirin-acetaminophen-caffeine (EXCEDRIN MIGRAINE) 250-250-65 MG per tablet   Yes No   Sig: Take 1 tablet by mouth every 6 hours as needed for headaches   ibuprofen (ADVIL/MOTRIN) 600 MG tablet   No No   Sig: Take 1 tablet (600 mg) by mouth every 6 hours as needed for moderate pain   rOPINIRole (REQUIP) 0.25 MG tablet 3/18/2019 at 1400  No Yes   Sig: Take 1 tab three times daily for 1 week, then 2 tabs three times daily for 1 week, then 3 tabs three times daily      Facility-Administered Medications: None     Allergies   No Known Allergies    Physical Exam   Vital Signs:     BP: 116/82 Pulse: 75   Resp: 19 SpO2: 98 % O2 Device: None (Room air)    Weight: 160 lbs 0 oz    Constitutional: awake, alert, cooperative, no apparent distress, and appears stated age  Eyes: Lids and lashes normal, pupils equal, round and reactive to light, extra ocular muscles intact, sclera clear, conjunctiva normal  ENT: Normocephalic, without obvious abnormality, atraumatic, sinuses nontender on palpation, external ears without lesions, oral  pharynx with moist mucous membranes, tonsils without erythema or exudates, gums normal and good dentition.  Hematologic / Lymphatic: no cervical lymphadenopathy and no supraclavicular lymphadenopathy  Respiratory: No increased work of breathing, good air exchange, clear to auscultation bilaterally, no crackles or wheezing  Cardiovascular: Normal apical impulse, regular rate and rhythm, normal S1 and S2, no S3 or S4, and no murmur noted  GI: No scars, normal bowel sounds, soft, non-distended, non-tender, no masses palpated, no hepatosplenomegally  Skin: no bruising or bleeding and normal skin color, texture, turgor  Musculoskeletal: Has what appears to be a small puncture wound just above and lateral to his left knee.  Very tender in that spot.  Cannot palpate an obvious foreign body.  He cannot move that knee without lots of pain  Neurologic: Awake, alert, oriented to name, place and time.  Cranial nerves II-XII are grossly intact.  Motor is 5 out of 5 bilaterally.   Data   Data reviewed today: I reviewed all medications, new labs and imaging results over the last 24 hours. I personally reviewed .    Labs are pending including EKG, chest x-ray, CBC and BMP.

## 2019-03-19 NOTE — PROGRESS NOTES
Notified MD at 1200 PM regarding new orders.      Spoke with: June    Orders were obtained.    Comments: pain management

## 2019-03-19 NOTE — PROGRESS NOTES
SPIRITUAL HEALTH SERVICES  SPIRITUAL ASSESSMENT Progress Note  [facility]      Pre-Surgery:  provided a prayer for Reilly Alves.   is available for pt/family needs.    Lobito Wild M.Div., [facility]  Staff   Office tel: [phone]

## 2019-03-19 NOTE — PROGRESS NOTES
Lab work including CBC and BMP were reviewed and were normal.  EKG shows regular sinus rhythm with no changes and chest x-ray is normal.  Patient is medically cleared for surgery and anesthesia later today to remove foreign body in the left knee.  Electronically signed by SRIKANTH Marin on March 19, 2019

## 2019-03-19 NOTE — ED NOTES
ED Nursing criteria listed below was addressed during verbal handoff:     Abnormal vitals: Yes  Abnormal results: Yes  Med Reconciliation completed: Yes  Meds given in ED: Yes  Any Overdue Meds: N/A  Core Measures: Yes  Isolation: N/A  Special needs: Yes  Skin assessment: Yes    Observation Patient  Education provided: Yes, handout given to pt and discussed.      Report given to Trina HOLGUIN.  Pt to room 270.

## 2019-03-19 NOTE — ED NOTES
Pt requesting that ortho address his concerns in the right hand.  Pt states that possible carpal tunnel and frost bite.  Pain in the knuckles, especially at night.

## 2019-03-19 NOTE — ASSESSMENT & PLAN NOTE
Accidentally shot a nail into his left knee at home  Imaging is showing an embedded nail of the left distal femur, no signs of exposed nail on the skin surface  Orthopedics has been consulted and they are planning a surgical removal tomorrow  [place] observation, n.p.o. post midnight, IV fluids with IV narcotics ordered for pain  Ancef will be given every 6 hours for infection at this time  Patient is up-to-date on his tetanus booster having received in December of last year

## 2019-03-19 NOTE — PROGRESS NOTES
S-(situation): Patient registered to Observation. Patient arrived to room 270 via cart from ED    B-(background): Accidentally shot a nail into his left knee at home    A-(assessment): Alert and oriented x4. VSS. Pleasant and cooperative. Afebrile. Tolerating RA with SATS mid to upper 90's. Patient reported pain in left knee. Dilaudid was administered q3hrs and was effective in managing pain to comfortable levels while at rest but still 8-10/10 pain with movement. Patient unable to bend left knee and refuses to reposition from his right side.  Patient has been NPO diet. Patient on bed rest. CMS intact in BLE. Puncture site on left knee scabbed with no drainage.    R-(recommendations): Monitor VS, I&O, Labs, weight    R-(recommendations): Orders and observation goals reviewed with patient and significant other    Nursing Observation criteria listed below was met:    Skin issues/needs documented:Yes  Isolation needs addressed, if appropriate: NA  Fall Prevention: Education given and documented: Yes  Education Assessment documented:Yes  Education Documented (Pre-existing chronic infection such as, MRSA/VRE need education on admission): Yes  OBS video/handout Reviewed & DocumentedYes  Medication Reconciliation Complete: Yes  New medication patient education completed and documented (Possible Side Effects of Common Medications handout): Yes  Home medications if not able to send immediately home with family stored here: NA  Reminder note placed in discharge instructions: NA  Patient has discharge needs (If yes, please explain): Yes

## 2019-03-20 ENCOUNTER — TELEPHONE (OUTPATIENT)
Dept: FAMILY MEDICINE | Facility: OTHER | Age: 54
End: 2019-03-20

## 2019-03-20 ENCOUNTER — NURSE TRIAGE (OUTPATIENT)
Dept: NURSING | Facility: CLINIC | Age: 54
End: 2019-03-20

## 2019-03-20 NOTE — OP NOTE
Procedure Date: 03/19/2019      PREOPERATIVE DIAGNOSIS:  Foreign body in left femur.      POSTOPERATIVE DIAGNOSIS:  Foreign body in left femur.      PROCEDURE:  Removal of foreign body, left femur.      HISTORY:  This is a 53-year-old male working on a home project last, night who shot himself in the left knee with a nail gun.  The nail penetrated into the femur and is in the knee joint.  He is unable to ambulate and in extreme pain.  He presents now for nail removal with irrigation and debridement.      DESCRIPTION OF PROCEDURE:  After a smooth general anesthetic, the patient's left leg was prepped and draped in a sterile fashion.  Pause was performed for patient verification.  The leg was exsanguinated and tourniquet inflated to 300 mmHg. I can palpate the prominent nail just superior and lateral to the patella.        A small incision was made directly over this, carried through subcutaneous tissue to expose the portion of the nail, bent anteriorly.  I exposed the nail, entered the joint and drained bloody fluid from the joint.  I then used a large needle frank to grasp the nail and extract it superiorly.  The entire nail appeared to come out.  A C-arm was used to verify that the entire nail had been removed.  I irrigated the knee joint with antibiotic saline using 1 liter.        At this point, the deep fascia capsule was closed with interrupted 2-0 Vicryl suture.  The subcutaneous tissue was closed with interrupted 2-0 Vicryl suture.  The skin edges were closed with a running 3-0 nylon vertical mattress suture.  Sterile dressings were applied with the tourniquet deflated and Ace wrap was applied.        He will be allowed weightbearing as tolerated.  He will return to clinic in 9 days for suture removal.  He was taken to the Recovery Room in stable condition.         CORA HAYS MD             D: 03/19/2019   T: 03/20/2019   MT:       Name:     LAVELLE NAIR   MRN:      [id number]         Account:        [id number]   :      1965           Procedure Date: 2019      Document: [id number]

## 2019-03-20 NOTE — ANESTHESIA CARE TRANSFER NOTE
Patient: Alejandro Alves    Procedure(s):  FOREIGN BODY REMOVAL LEFT KNEE    Diagnosis: foreign body left knee  Diagnosis Additional Information: No value filed.    Anesthesia Type:   General, LMA     Note:  Anesthesia Care Transfer Notewriter    Vitals: (Last set prior to Anesthesia Care Transfer)    CRNA VITALS  3/19/2019 1843 - 3/19/2019 1925      3/19/2019             Pulse:  61    SpO2:  99 %    Resp Rate (observed):  8                Electronically Signed By: JOSELINE Colin CRNA  March 19, 2019  7:25 PM

## 2019-03-20 NOTE — PROGRESS NOTES
[facility] Orthopedic Brief Operative Note    Pre-operative diagnosis: foreign body left femur   Post-operative diagnosis: Same   Procedure: Removal nail from left femur.   Surgeon: Bon Watkins MD   Assistant(s): None   Anesthesia: General mask   Estimated blood loss: Minimal   Total IV fluids: (See anesthesia record)   Blood transfusion: No transfusion was given during surgery   Total urine output: (See anesthesia record)   Drains: None   Specimens: None   Implants: None   Findings: Nail imbedded in left anterior lateral femur.  Entire nail removed.  C-arm verified   Complications: None   Condition: Stable   Weight bearing status: Weight bearing as tolerated   Activity: Activity as tolerated  Patient may move about with assist as indicated or with supervision   Orthotic management: Not applicable   Comments: See dictated operative report for full details

## 2019-03-20 NOTE — ANESTHESIA CARE TRANSFER NOTE
Patient: Alejandro Alves    Procedure(s):  FOREIGN BODY REMOVAL LEFT KNEE    Diagnosis: foreign body left knee  Diagnosis Additional Information: No value filed.    Anesthesia Type:   General, LMA     Note:  Airway :Nasal Cannula  Patient transferred to:PACU  Handoff Report: Identifed the Patient, Identified the Reponsible Provider, Reviewed the pertinent medical history, Discussed the surgical course, Reviewed Intra-OP anesthesia mangement and issues during anesthesia, Set expectations for post-procedure period and Allowed opportunity for questions and acknowledgement of understanding      Vitals: (Last set prior to Anesthesia Care Transfer)    CRNA VITALS  3/19/2019 1843 - 3/19/2019 1924      3/19/2019             Pulse:  61    SpO2:  99 %    Resp Rate (observed):  8                Electronically Signed By: JOSELINE Colin CRNA  March 19, 2019  7:24 PM

## 2019-03-20 NOTE — ANESTHESIA POSTPROCEDURE EVALUATION
Patient: Alejandro Alves    Procedure(s):  FOREIGN BODY REMOVAL LEFT KNEE    Diagnosis:foreign body left knee  Diagnosis Additional Information: No value filed.    Anesthesia Type:  General, LMA    Note:  Anesthesia Post Evaluation    Patient location during evaluation: Phase 2 and Bedside  Patient participation: Able to fully participate in evaluation  Level of consciousness: awake  Pain management: adequate  Airway patency: patent  Cardiovascular status: acceptable and hemodynamically stable  Respiratory status: acceptable, room air and nonlabored ventilation  Hydration status: stable  PONV: none     Anesthetic complications: None    Comments: Patient was happy with the anesthesia care received and no anesthesia related complications were noted.  I will follow up with the patient again if it is needed.        Last vitals:  Vitals:    03/19/19 2000 03/19/19 2002 03/19/19 2013   BP: 109/67 102/67    Pulse: 66 66    Resp: 8 13 14   Temp:      SpO2: 99% 99% 96%         Electronically Signed By: JOSELINE Colin CRNA  March 19, 2019  8:14 PM

## 2019-03-20 NOTE — TELEPHONE ENCOUNTER
Reason for follow up call: Alejandro Alves appeared on our list for being seen in and recenlty discharge from the Hospital.    Chief Complaint   Patient presents with     Hospital F/U     Foreign Body Of Knee, Left, Initial Encounter       Encounter routed for Clinic RN to call for follow up

## 2019-03-20 NOTE — DISCHARGE INSTRUCTIONS
[facility] Same-Day Surgery   Adult Discharge Orders & Instructions     For 24 hours after surgery    1. Get plenty of rest.  A responsible adult must stay with you for at least 24 hours after you leave the hospital.   2. Do not drive or use heavy equipment.  If you have weakness or tingling, don't drive or use heavy equipment until this feeling goes away.  3. Do not drink alcohol.  4. Avoid strenuous or risky activities.  Ask for help when climbing stairs.   5. You may feel lightheaded.  If so, sit for a few minutes before standing.  Have someone help you get up.   6. You may have a slight fever. Call the doctor if your fever is over 100 F (37.7 C) (taken under the tongue) or lasts longer than 24 hours.  7. You may have a dry mouth, a sore throat, muscle aches or trouble sleeping.  These should go away after 24 hours.  8. Do not make important or legal decisions.  We don t expect you to have any problems from the surgery or treatment you had today. Just in case, here s what to do if you have pain, upset stomach (nausea), bleeding or infection:  Pain:  Take medicines your doctor has prescribed or over-the-counter medicine they have suggested. Resting and using ice packs can help, too. For surgery on an arm or leg, raise it on a pillow to ease swelling. Call your doctor if these methods don t work.  Copyright Beny Gambino, Licensed under [employer]  Upset stomach (nausea):  Take anti-nausea medicine approved by your doctor. Drink clear liquids like apple juice, ginger ale, broth or 7-Up. Be sure to drink enough fluids. Rest can help, too. Move to normal foods when you re ready. Bleeding:  In the first 24 hours, you may see a little blood on your dressing, about the size of a quarter. You don t need to worry about this much blood, but if the blood spot keeps getting bigger:    Put pressure on the wound if you can, AND    Call your doctor.  Copyright [employer], Licensed under [employer]  Fever/Infection: Please call your doctor if you have any of these signs:    Redness    Swelling    Wound feels warm    Pain gets worse    Bad-smelling fluid leaks from wound    Fever or chills  Call your doctor for any of the followin.  It has been over 8 to 10 hours since surgery and you are still not able to urinate (pass water).    2.  Headache for over 24 hours.    Nurse advice line: [phone]

## 2019-03-21 NOTE — TELEPHONE ENCOUNTER
"Dr. Nguyen called patient back and recommended NSAID like Aleve.  Wife of patient calling back saying patient is having \"bad spasms\" and wondering if Aleve will help that, and asking that the provider be paged again.  Patient took Aleve just 15 minutes ago, and this nurse suggested sometimes inflammation can cause spasms, and perhaps the Aleve will help.  But caller asking for MD to ask muscle relaxers.  Paged Dr. Nguyen to [phone].  Patricia Christian RN  [facility]  "

## 2019-03-21 NOTE — TELEPHONE ENCOUNTER
"Dr. Nguyen called patient back and recommended NSAID like Aleve.  Wife of patient calling back saying patient is having \"bad spasms\" and wondering if Aleve will help that, and asking that the provider be paged again.  Patient took Aleve just 15 minutes ago, and this nurse suggested sometimes inflammation can cause spasms, and perhaps the Aleve will help.  But caller asking for MD to ask muscle relaxers.  Paged Dr. Nguyen to [phone].  Patricia Christian RN  [facility]    "

## 2019-03-21 NOTE — TELEPHONE ENCOUNTER
ED / Discharge Outreach Protocol    Patient Contact    Attempt # 2    Was call answered?  No.  Unable to leave message. Mailbox is full    Mellisa Mueller RN, BSN

## 2019-03-27 NOTE — DISCHARGE SUMMARY
[facility] Discharge Summary    Alejandro Alves MRN# [id number]   Age: 53 year old YOB: 1965     Date of Admission:  3/18/2019  Date of Discharge::  3/19/2019  8:48 PM  Admitting Physician:  Liam Marin MD  Discharge Physician:  Bon Watkins MD     Home clinic: [facility]          Admission Diagnoses:   Foreign body of knee, left, initial encounter [S80.252A]          Discharge Diagnosis:   Patient Active Problem List    Diagnosis     Foreign body of knee, left, initial encounter     Acute foreign body of left knee, initial encounter                                           Procedures:   Procedure(s): Removal foreign body left knee.                Medications Prior to Admission:     No medications prior to admission.             Discharge Medications:     Discharge Medication List as of 3/19/2019  8:14 PM      START taking these medications    Details   HYDROcodone-acetaminophen (NORCO) 5-325 MG tablet Take 1-2 tablets by mouth every 4 hours as needed for moderate to severe pain, Disp-15 tablet, R-0, Local Print         CONTINUE these medications which have NOT CHANGED    Details   aspirin-acetaminophen-caffeine (EXCEDRIN MIGRAINE) 250-250-65 MG per tablet Take 1 tablet by mouth every 6 hours as needed for headaches, Historical      ibuprofen (ADVIL/MOTRIN) 600 MG tablet Take 1 tablet (600 mg) by mouth every 6 hours as needed for moderate pain, Disp-30 tablet, R-0, E-Prescribe      NAPROXEN PO Take 500 mg by mouth, Historical      rOPINIRole (REQUIP) 0.25 MG tablet Take 1 tab three times daily for 1 week, then 2 tabs three times daily for 1 week, then 3 tabs three times daily, Disp-189 tablet, R-3, E-Prescribe                   Consultations:   Consultation during this admission received from orthopedics          Brief History of Illness:   Reason for admission requiring a surgical or invasive procedure:   Nail gun deployed into his left femur at knee.    The patient underwent the following procedure(s):   Removal nail from left femur.   There were no immediate complications during this procedure.    Please refer to the full operative summary for details.             Hospital Course:   The patient's hospital course was unremarkable.  He recovered as anticipated and experienced no post-operative complications.           Discharge Instructions and Follow-Up:   Discharge diet: Regular   Discharge activity: Activity as tolerated   Discharge follow-up: Follow up with me in 10 days   Wound care: Apply bandage daily  Keep wound clean and dry           Discharge Disposition:   Discharged to home      Attestation:  I have reviewed today's vital signs, notes, medications, labs and imaging.    Bon Watkins MD

## 2019-04-02 NOTE — PROGRESS NOTES
[facility]  [address] [facility] [address]  [phone]  Dept: [phone]    PRE-OP EVALUATION:  Today's date: 4/3/2019    Alejandro Alves (: 1965) presents for pre-operative evaluation assessment.  He requires evaluation and anesthesia risk assessment prior to undergoing surgery/procedure for treatment of hernia .    Fax number for surgical facility: [facility]  Primary Physician: Janelle Ramachandran  Type of Anesthesia Anticipated: to be determined    Patient has a Health Care Directive or Living Will:  NO    Preop Questions 4/3/2019   Who [facility] surgery[facility]   What are you having done? hernia   Date of Surgery/Procedure: [facility]   Facility or Hospital where procedure/surgery will be performed: [facility]   1.  Do you have a history of Heart attack, stroke, stent, coronary bypass surgery, or other heart surgery? No   2.  Do you ever have any pain or discomfort in your chest? No   3.  Do you have a history of  Heart Failure? No   4.   Are you troubled by shortness of breath when:  walking on a level surface, or up a slight hill, or at night? No   5.  Do you currently have a cold, bronchitis or other respiratory infection? No   6.  Do you have a cough, shortness of breath, or wheezing? No   7.  Do you sometimes get pains in the calves of your legs when you walk? No   8. Do you or anyone in your family have previous history of blood clots? No   9.  Do you or does anyone in your family have a serious bleeding problem such as prolonged bleeding following surgeries or cuts? No   10. Have you ever had problems with anemia or been told to take iron pills? No   11. Have you had any abnormal blood loss such as black, tarry or bloody stools? No   12. Have you ever had a blood transfusion? No   13. Have you or any of your relatives ever had problems with anesthesia? No   14. Do you have sleep apnea, excessive snoring or daytime drowsiness? No   15.  Do you have any prosthetic heart valves? No   16. Do you have prosthetic joints? No         HPI:     HPI related to upcoming procedure: Patient with right inguinal hernia.       See problem list for active medical problems.  Problems all longstanding and stable, except as noted/documented.  See ROS for pertinent symptoms related to these conditions.                                                                                                                                                          .    MEDICAL HISTORY:     Patient Active Problem List    Diagnosis Date Noted     Foreign body of knee, left, initial encounter 03/19/2019     Priority: Medium     Acute foreign body of left knee, initial encounter 03/18/2019     Priority: Medium     Subacromial impingement, right 02/09/2017     Priority: Medium     Superior glenoid labrum lesion of right shoulder, initial encounter 02/09/2017     Priority: Medium     Incomplete tear of right rotator cuff 02/09/2017     Priority: Medium     Restless legs syndrome (RLS) 04/20/2016     Priority: Medium     Hip pain, right 04/20/2016     Priority: Medium     Tobacco use disorder 04/11/2016     Priority: Medium      No past medical history on file.  Past Surgical History:   Procedure Laterality Date     COLONOSCOPY N/A 6/6/2016    Procedure: COMBINED COLONOSCOPY, SINGLE OR MULTIPLE BIOPSY/POLYPECTOMY BY BIOPSY;  Surgeon: Theron Vigil MD;  Location:  GI     REMOVE FOREIGN BODY LOWER EXTREMITY Left 3/19/2019    Procedure: FOREIGN BODY REMOVAL LEFT KNEE;  Surgeon: Bon Watkins MD;  Location:  OR     Current Outpatient Medications   Medication Sig Dispense Refill     aspirin-acetaminophen-caffeine (EXCEDRIN MIGRAINE) 250-250-65 MG per tablet Take 1 tablet by mouth every 6 hours as needed for headaches       ibuprofen (ADVIL/MOTRIN) 600 MG tablet Take 1 tablet (600 mg) by mouth every 6 hours as needed for moderate pain 30 tablet 0     rOPINIRole (REQUIP)  "0.25 MG tablet Take 1 tab three times daily for 1 week, then 2 tabs three times daily for 1 week, then 3 tabs three times daily (Patient taking differently: 3 tabs three times daily) 189 tablet 3     HYDROcodone-acetaminophen (NORCO) 5-325 MG tablet Take 1-2 tablets by mouth every 4 hours as needed for moderate to severe pain (Patient not taking: Reported on 4/3/2019) 15 tablet 0     NAPROXEN PO Take 500 mg by mouth       OTC products: none    No Known Allergies     Latex Allergy: NO    Social History     Tobacco Use     Smoking status: Current Every Day Smoker     Packs/day: 1.00     Smokeless tobacco: Never Used     Tobacco comment: patient states he would like to    Substance Use Topics     Alcohol use: No     History   Drug Use     Types: Cocaine     Comment: minimal       REVIEW OF SYSTEMS:   CONSTITUTIONAL: NEGATIVE for fever, chills, change in weight  INTEGUMENTARY/SKIN: NEGATIVE for worrisome rashes, moles or lesions  EYES: NEGATIVE for vision changes or irritation  ENT/MOUTH: NEGATIVE for ear, mouth and throat problems  RESP: NEGATIVE for significant cough or SOB  BREAST: NEGATIVE for masses, tenderness or discharge  CV: NEGATIVE for chest pain, palpitations or peripheral edema  GI: NEGATIVE for nausea, abdominal pain, heartburn, or change in bowel habits  : NEGATIVE for frequency, dysuria, or hematuria  MUSCULOSKELETAL: NEGATIVE for significant arthralgias or myalgia  NEURO: NEGATIVE for weakness, dizziness or paresthesias  ENDOCRINE: NEGATIVE for temperature intolerance, skin/hair changes  HEME: NEGATIVE for bleeding problems  PSYCHIATRIC: NEGATIVE for changes in mood or affect    EXAM:   /68   Pulse 96   Temp 99  F (37.2  C) (Temporal)   Resp 16   Ht 1.727 m (5' 8\")   Wt 70.8 kg (156 lb)   SpO2 97%   BMI 23.72 kg/m      GENERAL APPEARANCE: healthy, alert and no distress     EYES: EOMI,  PERRL     HENT: ear canals and TM's normal and nose and mouth without ulcers or lesions     NECK: no " adenopathy, no asymmetry, masses, or scars and thyroid normal to palpation     RESP: lungs clear to auscultation - no rales, rhonchi or wheezes     CV: regular rates and rhythm, normal S1 S2, no S3 or S4 and no murmur, click or rub     ABDOMEN:  soft, nontender, no HSM or masses and bowel sounds normal     MS: extremities normal- no gross deformities noted, no evidence of inflammation in joints, FROM in all extremities.     SKIN: no suspicious lesions or rashes     NEURO: Normal strength and tone, sensory exam grossly normal, mentation intact and speech normal     PSYCH: mentation appears normal. and affect normal/bright     LYMPHATICS: No cervical adenopathy    DIAGNOSTICS:   EKG: appears normal, NSR, normal axis, normal intervals, no acute ST/T changes c/w ischemia, no LVH by voltage criteria, unchanged from previous tracings    Recent Labs   Lab Test 03/19/19  0105 03/18/19  2346 11/26/18  1512   HGB  --  13.4 14.8   PLT  --  258 242     --  141   POTASSIUM 4.1  --  4.1   CR 0.72  --  0.76     IMPRESSION:   Reason for surgery/procedure: right hernia    The proposed surgical procedure is considered INTERMEDIATE risk.    REVISED CARDIAC RISK INDEX  The patient has the following serious cardiovascular risks for perioperative complications such as (MI, PE, VFib and 3  AV Block):  No serious cardiac risks  INTERPRETATION: 1 risks: Class II (low risk - 0.9% complication rate)    The patient has the following additional risks for perioperative complications:  No identified additional risks      ICD-10-CM    1. Preop general physical exam Z01.818        RECOMMENDATIONS:     --Consult hospital rounder / IM to assist post-op medical management    --Patient is to take all scheduled medications on the day of surgery EXCEPT for modifications listed below.    APPROVAL GIVEN to proceed with proposed procedure, without further diagnostic evaluation       Signed Electronically by: Juliana Menjivar PA-C    Copy of this  evaluation report is provided to requesting physician.    [place] Preop Guidelines    Revised Cardiac Risk Index

## 2019-04-03 ENCOUNTER — OFFICE VISIT (OUTPATIENT)
Dept: FAMILY MEDICINE | Facility: OTHER | Age: 54
End: 2019-04-03
Payer: COMMERCIAL

## 2019-04-03 VITALS
SYSTOLIC BLOOD PRESSURE: 102 MMHG | TEMPERATURE: 99 F | RESPIRATION RATE: 16 BRPM | DIASTOLIC BLOOD PRESSURE: 68 MMHG | WEIGHT: 156 LBS | OXYGEN SATURATION: 97 % | BODY MASS INDEX: 23.64 KG/M2 | HEIGHT: 68 IN | HEART RATE: 96 BPM

## 2019-04-03 DIAGNOSIS — K40.30 NON-RECURRENT UNILATERAL INGUINAL HERNIA WITH OBSTRUCTION WITHOUT GANGRENE: ICD-10-CM

## 2019-04-03 DIAGNOSIS — Z01.818 PREOP GENERAL PHYSICAL EXAM: Primary | ICD-10-CM

## 2019-04-03 PROCEDURE — 99214 OFFICE O/P EST MOD 30 MIN: CPT | Performed by: PHYSICIAN ASSISTANT

## 2019-04-03 ASSESSMENT — MIFFLIN-ST. JEOR: SCORE: 1522.11

## 2019-04-03 ASSESSMENT — PAIN SCALES - GENERAL: PAINLEVEL: MODERATE PAIN (4)

## 2019-04-08 ENCOUNTER — TELEPHONE (OUTPATIENT)
Dept: SURGERY | Facility: CLINIC | Age: 54
End: 2019-04-08

## 2019-04-08 ENCOUNTER — OFFICE VISIT (OUTPATIENT)
Dept: SURGERY | Facility: OTHER | Age: 54
End: 2019-04-08
Payer: COMMERCIAL

## 2019-04-08 VITALS
BODY MASS INDEX: 24.1 KG/M2 | SYSTOLIC BLOOD PRESSURE: 108 MMHG | RESPIRATION RATE: 18 BRPM | HEART RATE: 80 BPM | WEIGHT: 158.5 LBS | TEMPERATURE: 97.9 F | DIASTOLIC BLOOD PRESSURE: 80 MMHG

## 2019-04-08 DIAGNOSIS — F17.200 TOBACCO USE DISORDER: ICD-10-CM

## 2019-04-08 DIAGNOSIS — K40.20 NON-RECURRENT BILATERAL INGUINAL HERNIA WITHOUT OBSTRUCTION OR GANGRENE: Primary | ICD-10-CM

## 2019-04-08 PROCEDURE — 99215 OFFICE O/P EST HI 40 MIN: CPT | Performed by: SURGERY

## 2019-04-08 NOTE — PROGRESS NOTES
[facility] FOLLOW-UP NOTE  GENERAL SURGERY    PCP: Janelle Ramachandran         Assessment and Plan:   Assessment:   Alejandro Alves is a 53 year old male who presented for re-evaluation of his inguinal hernia      ICD-10-CM    1. Non-recurrent bilateral inguinal hernia without obstruction or gangrene K40.20 Catherine-Operative Worksheet   2. Tobacco use disorder F17.200        Plan:  -Reilly is now ready for his hernia surgery.     The patient was thoroughly counseled regarding Non-recurrent bilateral inguinal hernia without obstruction or gangrene [K40.20].     The patient was informed that the proposed procedure or medical intervention involves reduction and repair with or without mesh and does offer a very good likelihood of symptom relief. We also discussed the options of repairing the hernia laparoscopically versus open. Patient opted for laparoscopic repair.      The patient was made aware of the risks of the procedure, including but not limited to:  nerve entrapment or injury, persistence of pain (10%), injury to the bowel/bladder, infertility, ischemic orchitis (rare), Injury to the vas deferens (rare), hematoma, mesh migration, mesh infection, cardiac or pulmonary complication and anesthesia related complications also that difficulties may be encountered during recovery to include: wound infection, recurrence (5-10%), seroma, hematoma and chronic pain.     In the course of the evaluation we did discuss other therapeutic options with the patient, including continued watchful waiting. The risks and benefits of these options were also discussed which include but are not limited to: incarceration and/or strangulation..     Also discussed were possible problems or difficulties the patient may encounter if treatment was not pursued at this time.     The [facility] responsible for the procedure. Assistance during the procedure and during  hospitalization may also be provided by other physicians, nurses and technicians.     The patient was also informed that if exposure to the patient s blood or body fluids occurs during the procedure, HIV testing of the patient will occur unless they refuse at this time. Risk of blood transfusion is minimal.     The patient will be provided additional education resources by the support staff. If there are ever any questions regarding their diagnosis or the procedure, the patient is encouraged to ask.     All of the patient s or their legal representative s questions have been answered to their satisfaction and they have indicated a clear understanding of this discussion.   Alejandro expressed understanding of risks, benefits and alternatives and wished to proceed.     All findings, test results, and diagnosis were discussed with the patient. Alejandro participated in the decision making process and agreed with the plan of care. Questions were sought and answered.     40 mins visit, more than 50% of face to face time was spent in counseling and coordinating care as discussed above.             Subjective:     Alejandro Alves is a 53 year old male who presents for reevaluation of his right inguinal.  He is previously seen by me back in 2018 and had surgery scheduled but then backed out due to anxiety.  Patient signs have had numerous orthopedic surgery and feels that he is ready to undergo another surgery.  Patient stated that his right hernia has increased in pain.  He does not know if it has gotten bigger but there is still a bulge and he is able to push it back in.  He will no issues with urinating or bowel movements.  Not know if he has another hernia on the left groin.  No pain in that particular area.    On exam today I noted an obvious right inguinal hernia which is reducible; Likely direct.  Also a small reducible bulge on the left inguinal area likely direct.           Objective:       /80    Pulse 80   Temp 97.9  F (36.6  C) (Temporal)   Resp 18   Wt 158 lb 8 oz (71.9 kg)   BMI 24.10 kg/m     Constitutional: Awake, alert, in no acute distress.  Respiratory: Non-labored.   Cardiovascular: Regular rate and rhythm.   Abdomen: soft.  Reducible right inguinal hernia - likely direct; small left reducible inguinal hernia.      [facility]

## 2019-04-08 NOTE — TELEPHONE ENCOUNTER
Left message for return call to schedule surgery  Suazo suggested 4/15.  Marta GARCES'd to add case.

## 2019-04-08 NOTE — LETTER
4/8/2019         RE: Alejandro Alves  [address] [address] [facility] [address]        Dear Colleague,    Thank you for referring your patient, Alejandro Alves, to the [facility]. Please see a copy of my visit note below.    [facility] FOLLOW-UP NOTE  GENERAL SURGERY    PCP: Janelle Ramachandran         Assessment and Plan:   Assessment:   Alejandro Alves is a 53 year old male who presented for re-evaluation of his inguinal hernia      ICD-10-CM    1. Non-recurrent bilateral inguinal hernia without obstruction or gangrene K40.20 Catherine-Operative Worksheet   2. Tobacco use disorder F17.200        Plan:  -Reilly is now ready for his hernia surgery.     The patient was thoroughly counseled regarding Non-recurrent bilateral inguinal hernia without obstruction or gangrene [K40.20].     The patient was informed that the proposed procedure or medical intervention involves reduction and repair with or without mesh and does offer a very good likelihood of symptom relief. We also discussed the options of repairing the hernia laparoscopically versus open. Patient opted for laparoscopic repair.      The patient was made aware of the risks of the procedure, including but not limited to:  nerve entrapment or injury, persistence of pain (10%), injury to the bowel/bladder, infertility, ischemic orchitis (rare), Injury to the vas deferens (rare), hematoma, mesh migration, mesh infection, cardiac or pulmonary complication and anesthesia related complications also that difficulties may be encountered during recovery to include: wound infection, recurrence (5-10%), seroma, hematoma and chronic pain.     In the course of the evaluation we did discuss other therapeutic options with the patient, including continued watchful waiting. The risks and benefits of these options were also discussed which include but are not limited to: incarceration and/or strangulation..     Also discussed were  possible problems or difficulties the patient may encounter if treatment was not pursued at this time.     The patient was [facility] responsible for the procedure. Assistance during the procedure and during hospitalization may also be provided by other physicians, nurses and technicians.     The patient was also informed that if exposure to the patient s blood or body fluids occurs during the procedure, HIV testing of the patient will occur unless they refuse at this time. Risk of blood transfusion is minimal.     The patient will be provided additional education resources by the support staff. If there are ever any questions regarding their diagnosis or the procedure, the patient is encouraged to ask.     All of the patient s or their legal representative s questions have been answered to their satisfaction and they have indicated a clear understanding of this discussion.   Alejadnro expressed understanding of risks, benefits and alternatives and wished to proceed.     All findings, test results, and diagnosis were discussed with the patient. Alejandro participated in the decision making process and agreed with the plan of care. Questions were sought and answered.     40 mins visit, more than 50% of face to face time was spent in counseling and coordinating care as discussed above.             Subjective:     Alejandro Alves is a 53 year old male who presents for reevaluation of his right inguinal.  He is previously seen by me back in 2018 and had surgery scheduled but then backed out due to anxiety.  Patient signs have had numerous orthopedic surgery and feels that he is ready to undergo another surgery.  Patient stated that his right hernia has increased in pain.  He does not know if it has gotten bigger but there is still a bulge and he is able to push it back in.  He will no issues with urinating or bowel movements.  Not know if he has another hernia on the left  groin.  No pain in that particular area.    On exam today I noted an obvious right inguinal hernia which is reducible; Likely direct.  Also a small reducible bulge on the left inguinal area likely direct.           Objective:       /80   Pulse 80   Temp 97.9  F (36.6  C) (Temporal)   Resp 18   Wt 158 lb 8 oz (71.9 kg)   BMI 24.10 kg/m      Constitutional: Awake, alert, in no acute distress.  Respiratory: Non-labored.   Cardiovascular: Regular rate and rhythm.   Abdomen: soft.  Reducible right inguinal hernia - likely direct; small left reducible inguinal hernia.      AnthonyPrince Suazo DO  [facility]                Again, thank you for allowing me to participate in the care of your patient.        Sincerely,        Mercedes Suazo MD

## 2019-04-08 NOTE — TELEPHONE ENCOUNTER
Type of surgery: laparoscopic inguinal hernia repair with mesh  Location of surgery: [facility]  Date and time of surgery: 4/15  Surgeon: Lakeisha  Pre-Op Appt Date: 4/3  Post-Op Appt Date: 5/6   Packet sent out: Yes  Pre-cert/Authorization completed:  Not Applicable  Date: na

## 2019-04-15 ENCOUNTER — ANESTHESIA EVENT (OUTPATIENT)
Dept: SURGERY | Facility: CLINIC | Age: 54
End: 2019-04-15
Payer: COMMERCIAL

## 2019-04-15 ENCOUNTER — ANESTHESIA (OUTPATIENT)
Dept: SURGERY | Facility: CLINIC | Age: 54
End: 2019-04-15
Payer: COMMERCIAL

## 2019-04-15 ENCOUNTER — HOSPITAL ENCOUNTER (OUTPATIENT)
Facility: CLINIC | Age: 54
Discharge: HOME | End: 2019-04-15
Attending: SURGERY | Admitting: SURGERY
Payer: COMMERCIAL

## 2019-04-15 VITALS
TEMPERATURE: 97.7 F | HEART RATE: 86 BPM | RESPIRATION RATE: 14 BRPM | DIASTOLIC BLOOD PRESSURE: 68 MMHG | SYSTOLIC BLOOD PRESSURE: 109 MMHG | OXYGEN SATURATION: 95 %

## 2019-04-15 DIAGNOSIS — Z98.890 S/P LAPAROSCOPIC HERNIA REPAIR: Primary | ICD-10-CM

## 2019-04-15 DIAGNOSIS — Z87.19 S/P LAPAROSCOPIC HERNIA REPAIR: Primary | ICD-10-CM

## 2019-04-15 PROCEDURE — 25800030 ZZH RX IP 258 OP 636: Performed by: NURSE ANESTHETIST, CERTIFIED REGISTERED

## 2019-04-15 PROCEDURE — 25000128 H RX IP 250 OP 636: Performed by: SURGERY

## 2019-04-15 PROCEDURE — 27211024 ZZHC OR SUPPLY OTHER OPNP: Performed by: SURGERY

## 2019-04-15 PROCEDURE — 25000566 ZZH SEVOFLURANE, EA 15 MIN: Performed by: SURGERY

## 2019-04-15 PROCEDURE — 36000056 ZZH SURGERY LEVEL 3 1ST 30 MIN: Performed by: SURGERY

## 2019-04-15 PROCEDURE — 71000014 ZZH RECOVERY PHASE 1 LEVEL 2 FIRST HR: Performed by: SURGERY

## 2019-04-15 PROCEDURE — 25000128 H RX IP 250 OP 636: Performed by: NURSE ANESTHETIST, CERTIFIED REGISTERED

## 2019-04-15 PROCEDURE — 37000008 ZZH ANESTHESIA TECHNICAL FEE, 1ST 30 MIN: Performed by: SURGERY

## 2019-04-15 PROCEDURE — 25000132 ZZH RX MED GY IP 250 OP 250 PS 637: Performed by: SURGERY

## 2019-04-15 PROCEDURE — 27110038 ZZH RX 271: Performed by: SURGERY

## 2019-04-15 PROCEDURE — 25000132 ZZH RX MED GY IP 250 OP 250 PS 637: Performed by: NURSE ANESTHETIST, CERTIFIED REGISTERED

## 2019-04-15 PROCEDURE — 37000009 ZZH ANESTHESIA TECHNICAL FEE, EACH ADDTL 15 MIN: Performed by: SURGERY

## 2019-04-15 PROCEDURE — 40000306 ZZH STATISTIC PRE PROC ASSESS II: Performed by: SURGERY

## 2019-04-15 PROCEDURE — 71000015 ZZH RECOVERY PHASE 1 LEVEL 2 EA ADDTL HR: Performed by: SURGERY

## 2019-04-15 PROCEDURE — 49650 LAP ING HERNIA REPAIR INIT: CPT | Mod: 50 | Performed by: SURGERY

## 2019-04-15 PROCEDURE — 27210794 ZZH OR GENERAL SUPPLY STERILE: Performed by: SURGERY

## 2019-04-15 PROCEDURE — 36000058 ZZH SURGERY LEVEL 3 EA 15 ADDTL MIN: Performed by: SURGERY

## 2019-04-15 PROCEDURE — 71000027 ZZH RECOVERY PHASE 2 EACH 15 MINS: Performed by: SURGERY

## 2019-04-15 PROCEDURE — C1727 CATH, BAL TIS DIS, NON-VAS: HCPCS | Performed by: SURGERY

## 2019-04-15 PROCEDURE — 25000125 ZZHC RX 250: Performed by: NURSE ANESTHETIST, CERTIFIED REGISTERED

## 2019-04-15 PROCEDURE — 25000125 ZZHC RX 250: Performed by: SURGERY

## 2019-04-15 PROCEDURE — C1781 MESH (IMPLANTABLE): HCPCS | Performed by: SURGERY

## 2019-04-15 DEVICE — MESH 3DMAX LEFT LG 0115311: Type: IMPLANTABLE DEVICE | Site: INGUINAL | Status: FUNCTIONAL

## 2019-04-15 DEVICE — MESH 3DMAX RIGHT LG 0115321: Type: IMPLANTABLE DEVICE | Site: INGUINAL | Status: FUNCTIONAL

## 2019-04-15 RX ORDER — EPHEDRINE SULFATE 50 MG/ML
INJECTION, SOLUTION INTRAMUSCULAR; INTRAVENOUS; SUBCUTANEOUS PRN
Status: DISCONTINUED | OUTPATIENT
Start: 2019-04-15 | End: 2019-04-15

## 2019-04-15 RX ORDER — METOPROLOL TARTRATE 1 MG/ML
1-2 INJECTION, SOLUTION INTRAVENOUS EVERY 5 MIN PRN
Status: DISCONTINUED | OUTPATIENT
Start: 2019-04-15 | End: 2019-04-15 | Stop reason: HOSPADM

## 2019-04-15 RX ORDER — BUPIVACAINE HYDROCHLORIDE AND EPINEPHRINE 5; 5 MG/ML; UG/ML
INJECTION, SOLUTION PERINEURAL PRN
Status: DISCONTINUED | OUTPATIENT
Start: 2019-04-15 | End: 2019-04-15 | Stop reason: HOSPADM

## 2019-04-15 RX ORDER — HYDROMORPHONE HYDROCHLORIDE 1 MG/ML
.3-.5 INJECTION, SOLUTION INTRAMUSCULAR; INTRAVENOUS; SUBCUTANEOUS EVERY 10 MIN PRN
Status: DISCONTINUED | OUTPATIENT
Start: 2019-04-15 | End: 2019-04-15 | Stop reason: HOSPADM

## 2019-04-15 RX ORDER — MEPERIDINE HYDROCHLORIDE 25 MG/ML
25-50 INJECTION INTRAMUSCULAR; INTRAVENOUS; SUBCUTANEOUS
Status: DISCONTINUED | OUTPATIENT
Start: 2019-04-15 | End: 2019-04-15 | Stop reason: HOSPADM

## 2019-04-15 RX ORDER — ONDANSETRON 4 MG/1
4 TABLET, ORALLY DISINTEGRATING ORAL EVERY 30 MIN PRN
Status: DISCONTINUED | OUTPATIENT
Start: 2019-04-15 | End: 2019-04-15 | Stop reason: HOSPADM

## 2019-04-15 RX ORDER — ONDANSETRON 2 MG/ML
4 INJECTION INTRAMUSCULAR; INTRAVENOUS EVERY 30 MIN PRN
Status: DISCONTINUED | OUTPATIENT
Start: 2019-04-15 | End: 2019-04-15 | Stop reason: HOSPADM

## 2019-04-15 RX ORDER — NALOXONE HYDROCHLORIDE 0.4 MG/ML
.1-.4 INJECTION, SOLUTION INTRAMUSCULAR; INTRAVENOUS; SUBCUTANEOUS
Status: DISCONTINUED | OUTPATIENT
Start: 2019-04-15 | End: 2019-04-15 | Stop reason: HOSPADM

## 2019-04-15 RX ORDER — MEPERIDINE HYDROCHLORIDE 25 MG/ML
12.5 INJECTION INTRAMUSCULAR; INTRAVENOUS; SUBCUTANEOUS
Status: DISCONTINUED | OUTPATIENT
Start: 2019-04-15 | End: 2019-04-15 | Stop reason: HOSPADM

## 2019-04-15 RX ORDER — OXYCODONE AND ACETAMINOPHEN 5; 325 MG/1; MG/1
2 TABLET ORAL
Status: COMPLETED | OUTPATIENT
Start: 2019-04-15 | End: 2019-04-15

## 2019-04-15 RX ORDER — KETOROLAC TROMETHAMINE 30 MG/ML
30 INJECTION, SOLUTION INTRAMUSCULAR; INTRAVENOUS EVERY 6 HOURS PRN
Status: DISCONTINUED | OUTPATIENT
Start: 2019-04-15 | End: 2019-04-15 | Stop reason: HOSPADM

## 2019-04-15 RX ORDER — BUPIVACAINE HYDROCHLORIDE 2.5 MG/ML
INJECTION, SOLUTION INFILTRATION; PERINEURAL PRN
Status: DISCONTINUED | OUTPATIENT
Start: 2019-04-15 | End: 2019-04-15 | Stop reason: HOSPADM

## 2019-04-15 RX ORDER — LIDOCAINE HYDROCHLORIDE 20 MG/ML
INJECTION, SOLUTION INFILTRATION; PERINEURAL PRN
Status: DISCONTINUED | OUTPATIENT
Start: 2019-04-15 | End: 2019-04-15

## 2019-04-15 RX ORDER — FENTANYL CITRATE 50 UG/ML
INJECTION, SOLUTION INTRAMUSCULAR; INTRAVENOUS PRN
Status: DISCONTINUED | OUTPATIENT
Start: 2019-04-15 | End: 2019-04-15

## 2019-04-15 RX ORDER — PROPOFOL 10 MG/ML
INJECTION, EMULSION INTRAVENOUS PRN
Status: DISCONTINUED | OUTPATIENT
Start: 2019-04-15 | End: 2019-04-15

## 2019-04-15 RX ORDER — SODIUM CHLORIDE, SODIUM LACTATE, POTASSIUM CHLORIDE, CALCIUM CHLORIDE 600; 310; 30; 20 MG/100ML; MG/100ML; MG/100ML; MG/100ML
INJECTION, SOLUTION INTRAVENOUS CONTINUOUS
Status: DISCONTINUED | OUTPATIENT
Start: 2019-04-15 | End: 2019-04-15 | Stop reason: HOSPADM

## 2019-04-15 RX ORDER — HYDROXYZINE HYDROCHLORIDE 50 MG/ML
50 INJECTION, SOLUTION INTRAMUSCULAR EVERY 6 HOURS PRN
Status: DISCONTINUED | OUTPATIENT
Start: 2019-04-15 | End: 2019-04-15 | Stop reason: HOSPADM

## 2019-04-15 RX ORDER — DEXAMETHASONE SODIUM PHOSPHATE 10 MG/ML
INJECTION, SOLUTION INTRAMUSCULAR; INTRAVENOUS PRN
Status: DISCONTINUED | OUTPATIENT
Start: 2019-04-15 | End: 2019-04-15

## 2019-04-15 RX ORDER — DIMENHYDRINATE 50 MG/ML
INJECTION, SOLUTION INTRAMUSCULAR; INTRAVENOUS PRN
Status: DISCONTINUED | OUTPATIENT
Start: 2019-04-15 | End: 2019-04-15

## 2019-04-15 RX ORDER — ALBUTEROL SULFATE 0.83 MG/ML
2.5 SOLUTION RESPIRATORY (INHALATION) EVERY 4 HOURS PRN
Status: DISCONTINUED | OUTPATIENT
Start: 2019-04-15 | End: 2019-04-15 | Stop reason: HOSPADM

## 2019-04-15 RX ORDER — DIMENHYDRINATE 50 MG/ML
25 INJECTION, SOLUTION INTRAMUSCULAR; INTRAVENOUS
Status: DISCONTINUED | OUTPATIENT
Start: 2019-04-15 | End: 2019-04-15 | Stop reason: HOSPADM

## 2019-04-15 RX ORDER — HEPARIN SODIUM 5000 [USP'U]/.5ML
5000 INJECTION, SOLUTION INTRAVENOUS; SUBCUTANEOUS
Status: COMPLETED | OUTPATIENT
Start: 2019-04-15 | End: 2019-04-15

## 2019-04-15 RX ORDER — CEFAZOLIN SODIUM 2 G/100ML
2 INJECTION, SOLUTION INTRAVENOUS
Status: COMPLETED | OUTPATIENT
Start: 2019-04-15 | End: 2019-04-15

## 2019-04-15 RX ORDER — CEFAZOLIN SODIUM 1 G/3ML
1 INJECTION, POWDER, FOR SOLUTION INTRAMUSCULAR; INTRAVENOUS SEE ADMIN INSTRUCTIONS
Status: DISCONTINUED | OUTPATIENT
Start: 2019-04-15 | End: 2019-04-15 | Stop reason: HOSPADM

## 2019-04-15 RX ORDER — ONDANSETRON 2 MG/ML
INJECTION INTRAMUSCULAR; INTRAVENOUS PRN
Status: DISCONTINUED | OUTPATIENT
Start: 2019-04-15 | End: 2019-04-15

## 2019-04-15 RX ORDER — HYDRALAZINE HYDROCHLORIDE 20 MG/ML
2.5-5 INJECTION INTRAMUSCULAR; INTRAVENOUS EVERY 10 MIN PRN
Status: DISCONTINUED | OUTPATIENT
Start: 2019-04-15 | End: 2019-04-15 | Stop reason: HOSPADM

## 2019-04-15 RX ORDER — FENTANYL CITRATE 50 UG/ML
25-50 INJECTION, SOLUTION INTRAMUSCULAR; INTRAVENOUS
Status: DISCONTINUED | OUTPATIENT
Start: 2019-04-15 | End: 2019-04-15 | Stop reason: HOSPADM

## 2019-04-15 RX ORDER — LIDOCAINE 40 MG/G
CREAM TOPICAL
Status: DISCONTINUED | OUTPATIENT
Start: 2019-04-15 | End: 2019-04-15 | Stop reason: HOSPADM

## 2019-04-15 RX ORDER — BUPIVACAINE HYDROCHLORIDE 5 MG/ML
INJECTION, SOLUTION PERINEURAL PRN
Status: DISCONTINUED | OUTPATIENT
Start: 2019-04-15 | End: 2019-04-15 | Stop reason: HOSPADM

## 2019-04-15 RX ORDER — OXYCODONE AND ACETAMINOPHEN 5; 325 MG/1; MG/1
1 TABLET ORAL EVERY 6 HOURS PRN
Qty: 16 TABLET | Refills: 0 | Status: SHIPPED | OUTPATIENT
Start: 2019-04-15 | End: 2020-10-19

## 2019-04-15 RX ORDER — HYDROXYZINE HYDROCHLORIDE 25 MG/1
25 TABLET, FILM COATED ORAL
Status: COMPLETED | OUTPATIENT
Start: 2019-04-15 | End: 2019-04-15

## 2019-04-15 RX ADMIN — FENTANYL CITRATE 50 MCG: 50 INJECTION INTRAMUSCULAR; INTRAVENOUS at 17:48

## 2019-04-15 RX ADMIN — PROPOFOL 200 MG: 10 INJECTION, EMULSION INTRAVENOUS at 14:46

## 2019-04-15 RX ADMIN — HYDROXYZINE HYDROCHLORIDE 25 MG: 25 TABLET ORAL at 19:42

## 2019-04-15 RX ADMIN — Medication: at 16:57

## 2019-04-15 RX ADMIN — FENTANYL CITRATE 100 MCG: 50 INJECTION, SOLUTION INTRAMUSCULAR; INTRAVENOUS at 14:41

## 2019-04-15 RX ADMIN — FENTANYL CITRATE 50 MCG: 50 INJECTION INTRAMUSCULAR; INTRAVENOUS at 18:45

## 2019-04-15 RX ADMIN — MIDAZOLAM 2 MG: 1 INJECTION INTRAMUSCULAR; INTRAVENOUS at 14:41

## 2019-04-15 RX ADMIN — SODIUM CHLORIDE, POTASSIUM CHLORIDE, SODIUM LACTATE AND CALCIUM CHLORIDE: 600; 310; 30; 20 INJECTION, SOLUTION INTRAVENOUS at 14:11

## 2019-04-15 RX ADMIN — SODIUM CHLORIDE, POTASSIUM CHLORIDE, SODIUM LACTATE AND CALCIUM CHLORIDE: 600; 310; 30; 20 INJECTION, SOLUTION INTRAVENOUS at 15:39

## 2019-04-15 RX ADMIN — LIDOCAINE HYDROCHLORIDE 60 MG: 20 INJECTION, SOLUTION INFILTRATION; PERINEURAL at 14:46

## 2019-04-15 RX ADMIN — Medication 5 MG: at 14:57

## 2019-04-15 RX ADMIN — KETOROLAC TROMETHAMINE 30 MG: 30 INJECTION INTRAMUSCULAR; INTRAVENOUS at 17:24

## 2019-04-15 RX ADMIN — SUGAMMADEX 200 MG: 100 INJECTION, SOLUTION INTRAVENOUS at 16:47

## 2019-04-15 RX ADMIN — ROCURONIUM BROMIDE 10 MG: 10 INJECTION INTRAVENOUS at 15:36

## 2019-04-15 RX ADMIN — OXYCODONE HYDROCHLORIDE AND ACETAMINOPHEN 2 TABLET: 5; 325 TABLET ORAL at 18:22

## 2019-04-15 RX ADMIN — HYDROMORPHONE HYDROCHLORIDE 0.5 MG: 1 INJECTION, SOLUTION INTRAMUSCULAR; INTRAVENOUS; SUBCUTANEOUS at 18:21

## 2019-04-15 RX ADMIN — PHENYLEPHRINE HYDROCHLORIDE 200 MCG: 10 INJECTION, SOLUTION INTRAMUSCULAR; INTRAVENOUS; SUBCUTANEOUS at 15:04

## 2019-04-15 RX ADMIN — ROCURONIUM BROMIDE 10 MG: 10 INJECTION INTRAVENOUS at 16:08

## 2019-04-15 RX ADMIN — FENTANYL CITRATE 50 MCG: 50 INJECTION, SOLUTION INTRAMUSCULAR; INTRAVENOUS at 17:07

## 2019-04-15 RX ADMIN — ROCURONIUM BROMIDE 20 MG: 10 INJECTION INTRAVENOUS at 15:53

## 2019-04-15 RX ADMIN — ROCURONIUM BROMIDE 40 MG: 10 INJECTION INTRAVENOUS at 14:46

## 2019-04-15 RX ADMIN — FENTANYL CITRATE 50 MCG: 50 INJECTION INTRAMUSCULAR; INTRAVENOUS at 17:25

## 2019-04-15 RX ADMIN — FENTANYL CITRATE 50 MCG: 50 INJECTION, SOLUTION INTRAMUSCULAR; INTRAVENOUS at 16:06

## 2019-04-15 RX ADMIN — HEPARIN SODIUM 5000 UNITS: 10000 INJECTION, SOLUTION INTRAVENOUS; SUBCUTANEOUS at 14:54

## 2019-04-15 RX ADMIN — LIDOCAINE HYDROCHLORIDE 1 ML: 10 INJECTION, SOLUTION EPIDURAL; INFILTRATION; INTRACAUDAL; PERINEURAL at 14:10

## 2019-04-15 RX ADMIN — ONDANSETRON 4 MG: 2 INJECTION INTRAMUSCULAR; INTRAVENOUS at 16:46

## 2019-04-15 RX ADMIN — PHENYLEPHRINE HYDROCHLORIDE 200 MCG: 10 INJECTION, SOLUTION INTRAMUSCULAR; INTRAVENOUS; SUBCUTANEOUS at 15:23

## 2019-04-15 RX ADMIN — DIMENHYDRINATE 25 MG: 50 INJECTION, SOLUTION INTRAMUSCULAR; INTRAVENOUS at 14:57

## 2019-04-15 RX ADMIN — HYDROMORPHONE HYDROCHLORIDE 0.5 MG: 1 INJECTION, SOLUTION INTRAMUSCULAR; INTRAVENOUS; SUBCUTANEOUS at 17:59

## 2019-04-15 RX ADMIN — DEXAMETHASONE SODIUM PHOSPHATE 6 MG: 10 INJECTION, SOLUTION INTRAMUSCULAR; INTRAVENOUS at 14:57

## 2019-04-15 RX ADMIN — CEFAZOLIN SODIUM 2 G: 2 INJECTION, SOLUTION INTRAVENOUS at 14:50

## 2019-04-15 RX ADMIN — Medication 5 MG: at 15:02

## 2019-04-15 ASSESSMENT — LIFESTYLE VARIABLES: TOBACCO_USE: 1

## 2019-04-15 NOTE — DISCHARGE INSTRUCTIONS
[facility]    Home Care Following Hernia Repair    [facility] Suazo, DO      Hernia Type: bilateral Inguinal    Pain meds: You receive one Percocet at 6:15 PM, the second Percocet was given at 7:15 PM    600 mg Ibuprofen every 6 hrs as needed for pain.  The IV form was given at 5:30.  Delay taking Ibuprofen until 11:30 pm tonight.    You have indicated you like Aleve.  Aleve is in the same family of NSAIDs as Ibuprofen.  Do not take both Aleve and Ibuprofen.  Aleve dosing is 1-2 tablets every 12 hours.  You can take the Aleve tonight instead of the Ibuprofen.    650 mg of Tylenol/Acetaminophen every 6 hrs as needed for pain    You can alternate these meds so that you take one every 3 hrs.      Make sure you do not go over 4000 mg of Tylenol/Acetaminophen every 24 hrs, especially if you are taking Percocet 5/325mg.  Each of the Percocet contains  Care of the Incision:    Remove gauze dressing (if present) after 48 hours.    Surgical glue was used along with absorbable suture, keep your incision dry for 24 hours.   You may shower in 24 hours, but don t submerge under water for at least 2 weeks.  Gently pat your incision dry with a freshly laundered towel.    Do not touch your incision with bare hands or pick at scabs.    Leave your incision open to air.  Cover it only if clothing rubs or irritates it.  Activity:    Gradually increase your activity.  Walk short distances several times each day and increase the distance as your strength allows.    To promote circulation, do not cross your legs while sitting.    No strenuous lifting or straining for 2-3 weeks.   Do not lift anything over 10-20 pounds until your doctor approves an increase.    Return to work will be determined by the type of work you do and should be discussed with your physician.    Do not drive or operate equipment while taking prescription pain medicines.  You may drive 1 week after surgery if you have stopped taking prescription pain  medicines and are pain-free enough to react quickly and make an emergency stop if necessary.    Diet:    Return to the diet you were on before surgery.    Drink plenty of  water.    Avoid foods that cause constipation.      REMEMBER--most prescription pain pills cause constipation.  Walking, extra fluids, and increased fiber (fresh fruits and vegetables, etc.) are natural remedies for constipation.  You can also take mineral oil, 1-2 Tablespoons per day.  If still constipated you may try a stool softener such as Colace or Miralax.    Call Your Physician if You Have:    Redness, increased swelling or cloudy drainage from your incision.    A temperature of more than 101 degrees F.    Worsening pain in your incision not relieved by your prescription pain pills and/or a short rest.    Any questions or concerns about your recovery, please call         Business hours [phone]    After hours [phone] Nurse Advice Line (24 hours a day)    Follow-up Care:    Your follow up appointment is already scheduled.  See future appointments.   Call [phone] if questions or concerns.        [facility]    On-Q Pump Instructions     Dr. [facility] Suazo    What is the ON-Q Pump?  The On-Q pump is a small pump (balloon) filled with medicine to treat your pain.    How Does it Work?  It is attached to a catheter (plastic tube) and automatically delivers the medicine slowly.  Do not squeeze the pump.  You may clip it to your clothes or carry it in a small pouch.        The pump will slowly decrease in size over 2-5 days    You will not be able to see the medicine moving through the tube    Make sure the white clamp on the tubing is not clamped closed    Make sure the tubing is not kinked    Do not put tape on the filter  What should I do with my pump when I m sleeping?    Place the pump on a bedside table, or on top of the covers    Do not place the pump under the covers as it may become too warm    Do not place the  pump on the floor or hang it on a bedpost   Can I take a shower with the pump?    Follow you doctor s instructions for showering    Do not submerge the pump under water  Removal of Catheter    Wash your hands with soap and warm water.  Dry with a clean towel    Remove the dressing from the catheter site    Grasp the catheter close to the skin, and gently pull on the catheter    It should be easy to remove and not painful.  Do not tug or pull quickly.  If it does not remove easily, STOP. Call your doctor.  Continued pulling can break the catheter.  Do not cut the catheter    Place a bandage over the catheter site  WARNING: After you remove the catheter, check the catheter tip for the black marking to make sure the entire catheter was removed.  Call your doctor if you do not see the black tip.     Things to be aware of when using the ON-Q* Pain Relief System:  The following symptoms may represent a serious medical condition. Immediately close the clamp on the pump tubing and call your doctor or 911 in case of an emergency to prevent serious injury.  *Increase in pain  *Fever, chills, sweats  *Bowel or bladder changes  *Difficulty breathing  *Redness, warmth, discharge or excessive bleeding from the catheter site  *Pain, swelling or a large bruise around the catheter site  *Dizziness, lightheadedness   *Blurred vision  *Ringing, buzzing in your ears  *Metal taste in your mouth  *Numbness and/or tingling around your mouth, fingers or toes  *Drowsiness  *Confusin

## 2019-04-15 NOTE — ANESTHESIA CARE TRANSFER NOTE
Patient: Alejandro Alves    Procedure(s):  Laparoscopic Bilateral Inguinal Herniorrhaphy with Mesh    Diagnosis: symptomatic right inguinal  Diagnosis Additional Information: No value filed.    Anesthesia Type:   General     Note:  Airway :Face Mask  Patient transferred to:PACU  Handoff Report: Identifed the Patient, Identified the Reponsible Provider, Reviewed the pertinent medical history, Discussed the surgical course, Reviewed Intra-OP anesthesia mangement and issues during anesthesia, Set expectations for post-procedure period and Allowed opportunity for questions and acknowledgement of understanding      Vitals: (Last set prior to Anesthesia Care Transfer)    CRNA VITALS  4/15/2019 1635 - 4/15/2019 1727      4/15/2019             Pulse:  74    SpO2:  98 %                Electronically Signed By: JOSELINE Alves CRNA  April 15, 2019  5:27 PM

## 2019-04-15 NOTE — ANESTHESIA PREPROCEDURE EVALUATION
Anesthesia Pre-Procedure Evaluation    Patient: Alejandro Alves   MRN: [id number] : 1965          Preoperative Diagnosis: foreign body left knee    Procedure(s):  FOREIGN BODY REMOVAL LEFT KNEE    No past medical history on file.  Past Surgical History:   Procedure Laterality Date     COLONOSCOPY N/A 2016    Procedure: COMBINED COLONOSCOPY, SINGLE OR MULTIPLE BIOPSY/POLYPECTOMY BY BIOPSY;  Surgeon: Theron Vigil MD;  Location: [facility]     REMOVE FOREIGN BODY LOWER EXTREMITY Left 3/19/2019    Procedure: FOREIGN BODY REMOVAL LEFT KNEE;  Surgeon: Bon Watkins MD;  Location: [facility]       Anesthesia Evaluation     . Pt has had prior anesthetic. Type: MAC and General    No history of anesthetic complications          ROS/MED HX    ENT/Pulmonary:     (+)tobacco use, Current use , . .    Neurologic:  - neg neurologic ROS     Cardiovascular:  - neg cardiovascular ROS   (+) ----. : . . . :. . Previous cardiac testing date:results:date: results:ECG reviewed date:3/18/19 results:SR date: results:          METS/Exercise Tolerance:     Hematologic:  - neg hematologic  ROS       Musculoskeletal:   (+)  other musculoskeletal- right shoulder pain at times      GI/Hepatic:  - neg GI/hepatic ROS      (-) GERD   Renal/Genitourinary:  - ROS Renal section negative       Endo:  - neg endo ROS       Psychiatric: Comment: Hx cocaine use        Infectious Disease:  - neg infectious disease ROS       Malignancy:      - no malignancy   Other: Comment: H/O cocaine use   - neg other ROS                        Physical Exam  Normal systems: cardiovascular, pulmonary and dental    Airway   Mallampati: II  TM distance: >3 FB  Neck ROM: full    Dental     Cardiovascular   Rhythm and rate: regular and normal      Pulmonary    breath sounds clear to auscultation            Lab Results   Component Value Date    WBC 8.9 2019    HGB 13.4 2019    HCT 40.0 2019     2019      "03/19/2019    POTASSIUM 4.1 03/19/2019    CHLORIDE 107 03/19/2019    CO2 24 03/19/2019    BUN 26 03/19/2019    CR 0.72 03/19/2019     (H) 03/19/2019    SUJEY 8.2 (L) 03/19/2019    ALBUMIN 4.0 04/20/2016    PROTTOTAL 7.0 04/20/2016    ALT 56 04/20/2016    AST 22 04/20/2016    ALKPHOS 67 04/20/2016    BILITOTAL 0.4 04/20/2016    LIPASE 129 04/15/2015       Preop Vitals  BP Readings from Last 3 Encounters:   04/15/19 113/82   04/08/19 108/80   04/03/19 102/68    Pulse Readings from Last 3 Encounters:   04/15/19 75   04/08/19 80   04/03/19 96      Resp Readings from Last 3 Encounters:   04/15/19 16   04/08/19 18   04/03/19 16    SpO2 Readings from Last 3 Encounters:   04/15/19 99%   04/03/19 97%   03/19/19 96%      Temp Readings from Last 1 Encounters:   04/15/19 97.6  F (36.4  C) (Oral)    Ht Readings from Last 1 Encounters:   04/03/19 1.727 m (5' 8\")      Wt Readings from Last 1 Encounters:   04/08/19 71.9 kg (158 lb 8 oz)    Estimated body mass index is 24.1 kg/m  as calculated from the following:    Height as of 4/3/19: 1.727 m (5' 8\").    Weight as of 4/8/19: 71.9 kg (158 lb 8 oz).       Anesthesia Plan      History & Physical Review  History and physical reviewed and following examination; no interval change.    ASA Status:  2 .    NPO Status:  > 8 hours    Plan for General and ETT with Intravenous and Propofol induction. Maintenance will be Balanced and Inhalation.    PONV prophylaxis:  Ondansetron (or other 5HT-3), Meclizine or Dimenhydrinate and Dexamethasone or Solumedrol  Pt was anxious about surgery and had rescheduled this multiple times.  He is at risk for post-operative pain issues per his history and pt report of this.      Postoperative Care  Postoperative pain management:  IV analgesics and Oral pain medications.      Consents  Anesthetic plan, risks, benefits and alternatives discussed with:  Patient.  Use of blood products discussed: No .   .                   JOSELINE Alves CRNA  "

## 2019-04-15 NOTE — OP NOTE
Laparoscopic Inguinal Herniorrhaphy Procedure Note      OPERATIVE NOTE  [facility] SURGERY    DATE:  4/15/2019                Pre-operative Diagnosis: bilateral inguinal hernia    Post-operative Diagnosis: bilateral pantaloon inguinal hernia    Procedure: 1.  Laparoscopic bilateral inguinal herniorrhaphy with mesh    2.  Placement of On-Q pain pump    Surgeon: Mercedes Suazo DO    Assistant: Oswaldo Champion DO - assisted with retraction, mobilization, and camera holding    Anesthesia: GENERAL     Indications: Alejandro Alves is a 53 year old male with a symptomatic bilateral inguinal hernia.    Procedure Details:   The patient was seen in the pre-operative holding area. The risks, benefits, complications, treatment options, and expected outcomes were discussed with the patient. The possibilities of reaction to medication, pain, infection, bleeding, heart attack, death, injury to internal organs, recurrence, testicular damage, infertility, or need for further surgery were discussed with the patient. The patient concurred with the proposed plan, giving informed consent.  The site of surgery was properly noted/marked and pre-operative antibiotics were administered. The patient was taken to back to the operating room and placed on the table in the supine position.  General anesthesia was administered by the Department of Anesthesia via endotracheal tube.  A Time Out was held and the above information confirmed.    The infraumbilical region was infiltrated with 0.5% marcaine local anesthetic.  An incision was then made using a 15 blade scalpel.  Dissection was carried down through the soft tissues using S-retractors to the anterior rectus fascia.  An 11 blade scalpel was then used to make a vertical incision in the fascia.  S-retractors were used to gently sweep the rectus muscles aside.  A long Shayla clamp was then passed along the preperitoneal space down to the level of the pubic symphysis.  A 10 mm  dissecting balloon was then placed through this tract and insufflated under direct visualization with a 10 mm laparoscope, creating a preperitoneal dissection plane.  This was then switched out for a 10 mm structural balloon and a 30-degree laparoscope was inserted.  CO2 insufflation was performed to a pressure of 15 mm Hg, thus creating pneumopreperitoneum.  The pubic symphysis was identified.  Under direct visualization, two 5 mm trocars were inserted along the midline between the umbilical port and the pubic symphysis.      Attention was then turned to the right groin.  Dolphin-tipped graspers were used to dissect in a medial to lateral dissection out to the ASIS.  The inferior epigastric vessel was identified and protected.  An direct hernia and a large indirect hernia was identified. The hernia sac was mobilized from the surrounding cord structures until it was freed and able to lie along the floor of the inguinal canal.  A rent was made in the indirect hernia sac, thus a Veress needle was used to help release the pneumoperitonum; it was placed at the LUQ.  A large Bard 3DMax mesh was then introduced via the umbilical port and positioned properly along the anterior abdominal wall.  The mesh was affixed using the Secure strap tacking device medially above the pubic symphysis and laterally near the ASIS.  This yielded an adequate appearing repair.     Attention was then turned to the left groin.  Dolphin-tipped graspers were used to dissect in a medial to lateral dissection out to the ASIS.  The inferior epigastric vessel was identified and protected.  A direct hernia was identified along with a medium size indirect hernia. The hernia sac was mobilized from the surrounding cord structures until it was freed and able to lie along the floor of the inguinal canal.  We did get into the peritoneum thus an endooop was used to closed this rent prior to placement of the mesh A large Bard 3DMax mesh was then introduced  via the umbilical port and positioned properly along the anterior abdominal wall.  The mesh was affixed using the Securestrap tacking device medially above the pubic symphysis and laterally near the ASIS.  This yielded an adequate appearing repair.      The On-Q pain catheter were then placed via a separate stab incision.  The pneumopreperitoneum was evacuated and the mesh was noted to lie in good position along the anterior abdominal wall.  All trocars were removed and the testicle was noted to lie normally within the hemiscrotum.    The fascia at the umbilical site was closed with a single figure-of-eight suture of 0-Vicryl. The skin at all sites was closed with subcuticular sutures of 3-0 Monocryl in a buriied interrupted fashion.  Dermabond were applied as well as a Tegaderm dressing over the On-Q pain catheter site.  The patient tolerated the procedure well with no complications and was sent to the Postanesthesia Care Unit in stable condition.  All needle, sponge, and instruments counts were correct at the conclusion of the case.    Estimated Blood Loss:  Minimal    Specimens: none           Implants: Bard 3DMax mesh 2x           Complications:  None; patient tolerated the procedure well.    Disposition: PACU - hemodynamically stable.           Condition: stable      Electronically signed by: [facility] DO Lakeisha; [facility] 4/15/2019

## 2019-04-16 NOTE — ANESTHESIA POSTPROCEDURE EVALUATION
Patient: Alejandro Alves    Procedure(s):  Laparoscopic Bilateral Inguinal Herniorrhaphy with Mesh    Diagnosis:symptomatic right inguinal  Diagnosis Additional Information: No value filed.    Anesthesia Type:  General    Note:  Anesthesia Post Evaluation    Patient location during evaluation: Phase 2  Patient participation: Able to fully participate in evaluation  Level of consciousness: awake  Pain management: adequate  Airway patency: patent  Cardiovascular status: acceptable and hemodynamically stable  Respiratory status: acceptable, room air and nonlabored ventilation  Hydration status: stable  PONV: none     Anesthetic complications: None    Comments: Patient was happy with the anesthesia care received and no anesthesia related complications were noted.  I will follow up with the patient again if it is needed.        Last vitals:  Vitals:    04/15/19 1830 04/15/19 1845 04/15/19 1851   BP: 107/76 116/78 118/74   Pulse: 69     Resp: 9 12 12   Temp:      SpO2: 98% 95% 95%         Electronically Signed By: JOSELINE Alves CRNA  April 15, 2019  7:02 PM

## 2019-04-16 NOTE — PROGRESS NOTES
[facility] Same Day Surgery  Discharge Call Back  Alejandro Alves  1965  MRN: [id number]  Home: [phone] (home)   PCP: Janelle Ramachandran    We are calling to see how you're doing since your surgery/procedure with us?   Comments: good  Clinical Questions  1. Have you had time to look at your discharge instructions? Do you have any questions in regards to the instructions?   Comment: yes, no  2. Do you feel your pain is being controlled with the regimen the surgeon sent you home on? (ie: prescription medications, over the counter pain medications, ice packs)   Comments: yes  3. Have you noticed any drainage on your dressing? Do you know what to do if you have bleeding as a result of your procedure?   Comments: no  4. Have you had any nausea/vomiting? Do you know how to treat this?   Comment: no  5. Have you had any signs/symptoms of infection? (ie: fever, swelling, heat, drainage or redness) Do you know what to do if you have?   Comment: no  6. Do you have a follow up appointment made with your surgeon? Do you have a number to contact them at if you need it?   Comment: yes, yes  Retained Foreign Object (BOAZ, Hemovac, Penrose, Wound Packing, Vaginal Packing, Nasal Splints, Urethral Stents, Van Catheter)  1. Do you still have na in place?   2. If the item is still in place, can you review the plan for removal with me? na      You may be randomly selected to fill out a [facility] survey. We would appreciate you taking the time to fill this out. It is important to us if you would answer all of the questions on the survey.

## 2019-04-17 ENCOUNTER — TELEPHONE (OUTPATIENT)
Dept: SURGERY | Facility: CLINIC | Age: 54
End: 2019-04-17

## 2019-04-17 NOTE — TELEPHONE ENCOUNTER
Patient's wife, Alee called along with pt in background. Pt is s/p laparoscopic bilateral inguinal herniorrhaphy with mesh (bilat groin) on 4/15. They report pt is having pain near incision site intermittently; pain rated as 7/10 at times of increased pain that mostly increases with movement, so pt tries to rest and ice area. Wife states there is some minimal swelling at incision site as well as small amount of blood in tubing of On-Q pain catheter. Pt denies kinking of tubing. Pt has been alternating Advil and Percocet Q 3 hours. He does state that Percocet is managing pain for now. He denies fever, excessive swelling, or drainage from incision. Pt and wife assured this sounds appropriate for post op day 2; monitor for increased pain, swelling, drainage, fever. Continue to alternate medications as well as resting and icing. Routing to Dr. Suazo to advise. Please see attached picture in Mychart.    Elisa Doe RN on 4/17/2019 at 9:05 AM

## 2019-04-19 ENCOUNTER — MYC REFILL (OUTPATIENT)
Dept: SURGERY | Facility: CLINIC | Age: 54
End: 2019-04-19

## 2019-04-19 DIAGNOSIS — Z98.890 S/P LAPAROSCOPIC HERNIA REPAIR: ICD-10-CM

## 2019-04-19 DIAGNOSIS — Z87.19 S/P LAPAROSCOPIC HERNIA REPAIR: ICD-10-CM

## 2019-04-20 ENCOUNTER — NURSE TRIAGE (OUTPATIENT)
Dept: NURSING | Facility: CLINIC | Age: 54
End: 2019-04-20

## 2019-04-20 NOTE — TELEPHONE ENCOUNTER
"S/p hernia repair 4/15. Nearly out of Percocet. Sent [employer] message to doctor yesterday but did not get response. Having moderate pain even with the Percocet 1 tab q 6 hr and Advil as advised by  Explained to pt unfortunately we have no ability to refill narcotic pain meds on the weekend when clinic is closed as these can only be filled w/ hard copy Rx. Advised ED if pain cannot be controlled at home w/ Advil and Tylenol. Pam Ramey RN/FNA      Reason for Disposition    [1] Request for URGENT new prescription or refill of \"essential\" medication (i.e., likelihood of harm to patient if not taken) AND [2] triager unable to fill per unit policy    Additional Information    Negative: Drug overdose and nurse unable to answer question    Negative: Caller requesting information not related to medicine    Negative: Caller requesting a prescription for Strep throat and has a positive culture result    Negative: Rash while taking a medication or within 3 days of stopping it    Negative: Immunization reaction suspected    Negative: [1] Asthma and [2] having symptoms of asthma (cough, wheezing, etc)    Negative: MORE THAN A DOUBLE DOSE of a prescription or over-the-counter (OTC) drug    Negative: [1] DOUBLE DOSE (an extra dose or lesser amount) of over-the-counter (OTC) drug AND [2] any symptoms (e.g., dizziness, nausea, pain, sleepiness)    Negative: [1] DOUBLE DOSE (an extra dose or lesser amount) of prescription drug AND [2] any symptoms (e.g., dizziness, nausea, pain, sleepiness)    Negative: Took another person's prescription drug    Negative: [1] DOUBLE DOSE (an extra dose or lesser amount) of prescription drug AND [2] NO symptoms (Exception: a double dose of antibiotics)    Negative: Diabetes drug error or overdose (e.g., insulin or extra dose)    Protocols used: MEDICATION QUESTION CALL-ADULT-      "

## 2019-04-23 RX ORDER — OXYCODONE AND ACETAMINOPHEN 5; 325 MG/1; MG/1
1 TABLET ORAL EVERY 6 HOURS PRN
Qty: 16 TABLET | Refills: 0 | OUTPATIENT
Start: 2019-04-23

## 2019-04-23 NOTE — TELEPHONE ENCOUNTER
Patient read [employer] message but has not responded or called. Denied RX, can re assess should patient reach out again.     Lolita BERRY RN. . .  4/23/2019, 2:18 PM

## 2019-04-23 NOTE — TELEPHONE ENCOUNTER
Sent mm to patient to see if he is still having pain. He spoke with oncall over the weekend.     Lolita BERRY RN. . .  4/23/2019, 9:09 AM

## 2019-04-28 NOTE — TELEPHONE ENCOUNTER
"\"I had surgery on my knee yesterday 3/20 (see epic). I had a nail removed. The MD gave me   HYDROcodone-acetaminophen (NORCO) 5-325 MG tablet 15 tablet 0 3/19/2019  --   Sig - Route: Take 1-2 tablets by mouth every 4 hours as needed for moderate to severe        It's not helping at all. I still have it wrapped, I am getting muscle spasms that are brutal.\" Patient rates knee pain 9/10, denies other sx. Paged on call Dr. Nguyen( ortho group) through page op at 7:17 pm to call pt at [phone]. Call back if needed.  Sun Arteaga RN [place]        Reason for Disposition    [1] Post-op pain AND [2] not controlled with pain medications    Protocols used: POST-OP INCISION SYMPTOMS-ADULT-AH      " SUBJECTIVE:    Patient is a 83y old Male who presents with a chief complaint of Confusion (28 Apr 2019 05:49)    Currently admitted to medicine with the primary diagnosis of Neurological deficit present     Today is hospital day 4d. This morning he is resting comfortably in bed and reports no new issues or overnight events.     PAST MEDICAL & SURGICAL HISTORY  Mild dementia  Chronic right-sided heart failure  Depression  Liver cirrhosis, alcoholic  Borderline diabetes mellitus  HTN (hypertension)  Afib  Presence of Watchman left atrial appendage closure device  AICD (automatic cardioverter/defibrillator) present  History of cholecystectomy  History of appendectomy  History of knee surgery  Deviated nasal septum    SOCIAL HISTORY:  Negative for smoking/alcohol/drug use.     ALLERGIES:  No Known Allergies    MEDICATIONS:  STANDING MEDICATIONS  aspirin 325 milliGRAM(s) Oral daily  atorvastatin 80 milliGRAM(s) Oral at bedtime  chlorhexidine 4% Liquid 1 Application(s) Topical <User Schedule>  clopidogrel Tablet 75 milliGRAM(s) Oral daily  donepezil 10 milliGRAM(s) Oral at bedtime  enoxaparin Injectable 40 milliGRAM(s) SubCutaneous every 24 hours  ferrous    sulfate 325 milliGRAM(s) Oral daily  furosemide    Tablet 40 milliGRAM(s) Oral daily  losartan 100 milliGRAM(s) Oral daily  melatonin 5 milliGRAM(s) Oral at bedtime  metoprolol succinate ER 25 milliGRAM(s) Oral daily  spironolactone 50 milliGRAM(s) Oral daily    PRN MEDICATIONS  acetaminophen   Tablet .. 650 milliGRAM(s) Oral every 6 hours PRN    VITALS:   T(F): 97.3  HR: 60  BP: 100/53  RR: 18  SpO2: 99%    LABS:                        10.6   5.64  )-----------( 199      ( 28 Apr 2019 04:38 )             34.1     04-28    138  |  103  |  23<H>  ----------------------------<  111<H>  4.2   |  23  |  1.2    Ca    9.9      28 Apr 2019 04:38                    RADIOLOGY:    PHYSICAL EXAM:  GEN: No acute distress  LUNGS: Clear to auscultation bilaterally   HEART: Regular  ABD: Soft, non-tender, non-distended.  EXT: NC/NC/NE/2+PP/SEPULVEDA/Skin Intact.   NEURO: AAOX3    83M with pmhx of A-Fib s/p Watchman and AICD (not on A/C) Cirrhosis (Previous EtOH abuse sober for years now), CVA without residual symptoms, DM II, HTN, Mild Dementia, PTSD, Iron Deficiency anemia (no source identified after extensive work ups), Chronic Lower back pain secondary to Arthritis and DJD presents from home for increasing confusion and strange behavior.       #) Confusion possibly secondary to stroke  -ppm not MRI compatible  -CT H chronic microvascular changes and chornic L frontal lobe infarction.   -f/u CT head repeat  - CTA H&N w/o vascular stenosis.   -TSH 0.65 , B12 1820 , Folate 14.3 , and Ammonia 30 (hx of cirrhosis) all wnl  -Holding tramadol   -UA negative, UCx and BCx negative  -Continue with home   -c/w plavis  -resume donepezil  -c/w lipitor 80  -REEG borderline generalized slowing.   -Echo with severe MR, severe pulm htn   -PT/Rehab   Speech and Swallow eval    #)A-Fib: Currently ventricularly paced   -No A/C s/p watchman   -Cont metoprolol   -f/u EP  -possible JL on Monday to r/o intracardiac thrombus    #)DM II: Diet controlled at home  -Hemoglobin A1C, Whole Blood: 6.4:    #)HTN  -Cont losartan     #)PTSD  -Cont Mirtazepine     #)JEREMY  - no clinical evidence of bleeding  -Continue Iron     #GI PPX: Not indicated  #DVT ppx: Lovenox  Full code   Diet: DASH carb consistent dysphagia 3  Activity: As tolerated SUBJECTIVE:    Patient is a 83y old Male who presents with a chief complaint of Confusion (28 Apr 2019 05:49)  Currently admitted to medicine with the primary diagnosis of Neurological deficit present  Today is hospital day 4d. This morning he is resting comfortably in bed and reports no new issues or overnight events.   Pt feels well, no longer incontinent, AAOx4.     PAST MEDICAL & SURGICAL HISTORY  Mild dementia  Chronic right-sided heart failure  Depression  Liver cirrhosis, alcoholic  Borderline diabetes mellitus  HTN (hypertension)  Afib  Presence of Watchman left atrial appendage closure device  AICD (automatic cardioverter/defibrillator) present  History of cholecystectomy  History of appendectomy  History of knee surgery  Deviated nasal septum    SOCIAL HISTORY:  Negative for smoking/alcohol/drug use.     ALLERGIES:  No Known Allergies    MEDICATIONS:  STANDING MEDICATIONS  aspirin 325 milliGRAM(s) Oral daily  atorvastatin 80 milliGRAM(s) Oral at bedtime  chlorhexidine 4% Liquid 1 Application(s) Topical <User Schedule>  clopidogrel Tablet 75 milliGRAM(s) Oral daily  donepezil 10 milliGRAM(s) Oral at bedtime  enoxaparin Injectable 40 milliGRAM(s) SubCutaneous every 24 hours  ferrous    sulfate 325 milliGRAM(s) Oral daily  furosemide    Tablet 40 milliGRAM(s) Oral daily  losartan 100 milliGRAM(s) Oral daily  melatonin 5 milliGRAM(s) Oral at bedtime  metoprolol succinate ER 25 milliGRAM(s) Oral daily  spironolactone 50 milliGRAM(s) Oral daily    PRN MEDICATIONS  acetaminophen   Tablet .. 650 milliGRAM(s) Oral every 6 hours PRN    VITALS:   T(F): 97.3  HR: 60  BP: 100/53  RR: 18  SpO2: 99%    LABS:                        10.6   5.64  )-----------( 199      ( 28 Apr 2019 04:38 )             34.1     04-28    138  |  103  |  23<H>  ----------------------------<  111<H>  4.2   |  23  |  1.2    Ca    9.9      28 Apr 2019 04:38      Thyroid Stimulating Hormone, Serum: 0.65 uIU/mL (04.24.19 @ 17:43)    Vitamin B12, Serum: 1820 pg/mL (04.24.19 @ 17:43)    Folate, Serum: 14.3 ng/mL (04.24.19 @ 17:43)      RADIOLOGY:    PHYSICAL EXAM:  GEN: No acute distress  LUNGS: Clear to auscultation bilaterally   HEART: irregularly irregular, non-tachycardic.   ABD: Soft, non-tender, non-distended. No suprapubic tenderness  EXT: No edema legs. Full ROM b/l   NEURO: AAOX4, NIHSS 0, non-focal neuro exam.     83M with pmhx of A-Fib s/p Watchman and AICD (not on A/C) Cirrhosis (Previous EtOH abuse sober for years now), CVA without residual symptoms, DM II, HTN, Mild Dementia, PTSD, Iron Deficiency anemia (no source identified after extensive work ups), Chronic Lower back pain secondary to Arthritis and DJD presents from home for increasing confusion and strange behavior.       #) Confusion possibly secondary to stroke--resolved.   -ppm not MRI compatible  -CT H chronic microvascular changes and chronic L frontal lobe infarction.   -f/u CT head repeat  - CTA H&N w/o vascular stenosis.   -TSH 0.65 , B12 1820 , Folate 14.3 , and Ammonia 30 (hx of cirrhosis) all wnl  -LDL 6.4  -Holding tramadol   -UA negative, UCx and BCx negative  -Continue with home   -c/w plavix  -resumed donepezil  -c/w lipitor 80  -REEG borderline generalized slowing.   -Echo with severe MR, severe pulm htn       #)A-Fib: Currently ventricularly paced   -No A/C s/p watchman   -Cont metoprolol   -f/u EP  -possible JL on Monday to r/o intracardiac thrombus    #)DM II: Diet controlled at home  -Hemoglobin A1C, Whole Blood: 6.4    #)HTN  -Cont losartan     #)PTSD  -Cont Mirtazepine     #)JEREMY  - no clinical evidence of bleeding  -Continue Iron     #GI PPX: Not indicated  #DVT ppx: Lovenox  Full code   Diet: DASH carb consistent dysphagia 3  Activity: As tolerated

## 2019-05-06 ENCOUNTER — OFFICE VISIT (OUTPATIENT)
Dept: SURGERY | Facility: OTHER | Age: 54
End: 2019-05-06
Payer: COMMERCIAL

## 2019-05-06 VITALS
SYSTOLIC BLOOD PRESSURE: 118 MMHG | DIASTOLIC BLOOD PRESSURE: 70 MMHG | TEMPERATURE: 96.8 F | WEIGHT: 159 LBS | BODY MASS INDEX: 24.1 KG/M2 | HEIGHT: 68 IN

## 2019-05-06 DIAGNOSIS — F17.200 TOBACCO USE DISORDER: ICD-10-CM

## 2019-05-06 DIAGNOSIS — Z09 S/P BILATERAL INGUINAL HERNIA REPAIR, FOLLOW-UP EXAM: Primary | ICD-10-CM

## 2019-05-06 DIAGNOSIS — Z87.19 S/P BILATERAL INGUINAL HERNIA REPAIR, FOLLOW-UP EXAM: Primary | ICD-10-CM

## 2019-05-06 PROCEDURE — 99024 POSTOP FOLLOW-UP VISIT: CPT | Performed by: SURGERY

## 2019-05-06 ASSESSMENT — MIFFLIN-ST. JEOR: SCORE: 1535.72

## 2019-05-06 ASSESSMENT — PAIN SCALES - GENERAL: PAINLEVEL: NO PAIN (0)

## 2019-05-06 NOTE — LETTER
"    5/6/2019         RE: Alejandro Alves  [id number] [facility] [id number]        Dear Colleague,    Thank you for referring your patient, Alejandro Alves, to the [facility]. Please see a copy of my visit note below.    [facility] FOLLOW-UP NOTE  GENERAL SURGERY    PCP: Janelle Ramachandran         Assessment and Plan:   Assessment:   Alejandro Alves is a 53 year old male who presented post operatively from laparoscopic bilateral inguinal hernias 4/15/2019 and is doing very well.       ICD-10-CM    1. S/P bilateral inguinal hernia repair, follow-up exam Z09    2. Tobacco use disorder F17.200        Plan:  Reassured patient that soreness and intermittent pain at bilateral groins are normal especially the first 3 months; no signs of recurrent; will have lifting restriction of < 15lbs for 6 weeks from date of surgery.  Follow up prn           Subjective:     Alejandro Alves is a 53 year old male who presents post operatively from laparoscopic bilateral inguinal hernias 4/15/2019. Pain has been controled. Currently is not using pain medications. Eating a Regular and having regular bladder/bowel function. Overall doing well.           Objective:       /70   Temp 96.8  F (36  C) (Temporal)   Ht 1.727 m (5' 8\")   Wt 72.1 kg (159 lb)   BMI 24.18 kg/m      Constitutional: Awake, alert, in no acute distress.  Respiratory: Non-labored.   Cardiovascular: Regular rate and rhythm.   Abdomen: soft; ND; NT. Incision is Healing well.  No signs of recurrent hernias; no seromas    [facility] [place], DO  [facility]              Again, thank you for allowing me to participate in the care of your patient.        Sincerely,        AnthonyShebah MD [place]    "

## 2019-05-07 NOTE — PROGRESS NOTES
"[facility] FOLLOW-UP NOTE  GENERAL SURGERY    PCP: Janelle Ramachandran         Assessment and Plan:   Assessment:   Alejandro Alves is a 53 year old male who presented post operatively from laparoscopic bilateral inguinal hernias 4/15/2019 and is doing very well.       ICD-10-CM    1. S/P bilateral inguinal hernia repair, follow-up exam Z09    2. Tobacco use disorder F17.200        Plan:  Reassured patient that soreness and intermittent pain at bilateral groins are normal especially the first 3 months; no signs of recurrent; will have lifting restriction of < 15lbs for 6 weeks from date of surgery.  Follow up prn           Subjective:     Alejandro Alves is a 53 year old male who presents post operatively from laparoscopic bilateral inguinal hernias 4/15/2019. Pain has been controled. Currently is not using pain medications. Eating a Regular and having regular bladder/bowel function. Overall doing well.           Objective:       /70   Temp 96.8  F (36  C) (Temporal)   Ht 1.727 m (5' 8\")   Wt 72.1 kg (159 lb)   BMI 24.18 kg/m     Constitutional: Awake, alert, in no acute distress.  Respiratory: Non-labored.   Cardiovascular: Regular rate and rhythm.   Abdomen: soft; ND; NT. Incision is Healing well.  No signs of recurrent hernias; no seromas    [facility], DO  [facility]            "

## 2019-05-23 DIAGNOSIS — G25.81 RESTLESS LEGS SYNDROME (RLS): ICD-10-CM

## 2019-05-24 RX ORDER — ROPINIROLE 0.25 MG/1
TABLET, FILM COATED ORAL
Qty: 189 TABLET | Refills: 3 | Status: SHIPPED | OUTPATIENT
Start: 2019-05-24 | End: 2020-05-13

## 2019-05-24 NOTE — TELEPHONE ENCOUNTER
"Requested Prescriptions   Pending Prescriptions Disp Refills     rOPINIRole (REQUIP) 0.25 MG tablet [Pharmacy Med Name: ROPINIROLE 0.25MG   TAB] 189 tablet 3     Sig: TAKE 1 TABLET BY MOUTH THREE TIMES DAILY  FOR 7 DAYS THEN 2 THREE TIMES DAILY FOR 7 DAYS THEN 3 THREE TIMES DAILY   Last Written Prescription Date:  11/9/2018  Last Fill Quantity: 189,  # refills: 3   Last office visit: 11/09/2018 with prescribing provider:  0   Future Office Visit:        Antiparkinson's Agents Protocol Failed - 5/23/2019  6:43 PM        Failed - ALT on record in past 12 months         Recent Labs   Lab Test 04/20/16  1010   ALT 56           Passed - Blood pressure under 140/90 in past 12 months     BP Readings from Last 3 Encounters:   05/06/19 118/70   04/15/19 109/68   04/08/19 108/80           Passed - CBC on record in past 12 months     Recent Labs   Lab Test 03/18/19  2346   WBC 8.9   RBC 4.36*   HGB 13.4   HCT 40.0              Passed - Serum Creatinine on file in past 12 months     Recent Labs   Lab Test 03/19/19  0105   CR 0.72             Passed - Medication is active on med list        Passed - Patient is age 18 or older        Passed - Recent (6 mo) or future (30 days) visit within the authorizing provider's specialty     Patient had office visit in the last 6 months or has a visit in the next 30 days with authorizing provider or within the authorizing provider's specialty.  See \"Patient Info\" tab in inbasket, or \"Choose Columns\" in Meds & Orders section of the refill encounter.          Routing refill request to provider for review/approval because:  Labs not current:  DELMI Weiss RN              "

## 2019-09-30 ENCOUNTER — HEALTH MAINTENANCE LETTER (OUTPATIENT)
Age: 54
End: 2019-09-30

## 2020-05-13 ENCOUNTER — VIRTUAL VISIT (OUTPATIENT)
Dept: FAMILY MEDICINE | Facility: CLINIC | Age: 55
End: 2020-05-13
Payer: COMMERCIAL

## 2020-05-13 DIAGNOSIS — G25.81 RESTLESS LEGS SYNDROME (RLS): ICD-10-CM

## 2020-05-13 PROCEDURE — 99213 OFFICE O/P EST LOW 20 MIN: CPT | Mod: TEL | Performed by: NURSE PRACTITIONER

## 2020-05-13 RX ORDER — ROPINIROLE 0.25 MG/1
TABLET, FILM COATED ORAL
Qty: 189 TABLET | Refills: 3 | Status: ON HOLD | OUTPATIENT
Start: 2020-05-13 | End: 2023-05-10

## 2020-05-13 NOTE — PROGRESS NOTES
"Alejandro Alves is a 54 year old male who is being evaluated via a billable telephone visit.      The patient has been notified of following:     \"This telephone visit will be conducted via a call between you and your physician/provider. We have found that certain health care needs can be provided without the need for a physical exam.  This service lets us provide the care you need with a short phone conversation.  If a prescription is necessary we can send it directly to your pharmacy.  If lab work is needed we can place an order for that and you can then stop by our lab to have the test done at a later time.    Telephone visits are billed at different rates depending on your insurance coverage. During this emergency period, for some insurers they may be billed the same as an in-person visit.  Please reach out to your insurance provider with any questions.    If during the course of the call the physician/provider feels a telephone visit is not appropriate, you will not be charged for this service.\"    Patient has given verbal consent for Telephone visit?  Yes    What phone number would you like to be contacted at? Home     How would you like to obtain your AVS? Mail a copy    Subjective     Alejandro Alves is a 54 year old male who presents to clinic today for the following health issues:    HPI  Medication Followup of Ropinirole     Taking Medication as prescribed: yes    Side Effects:  None    Medication Helping Symptoms:  yes              Patient Active Problem List   Diagnosis     Tobacco use disorder     Restless legs syndrome (RLS)     Hip pain, right     Subacromial impingement, right     Superior glenoid labrum lesion of right shoulder, initial encounter     Incomplete tear of right rotator cuff     Acute foreign body of left knee, initial encounter     Foreign body of knee, left, initial encounter     Past Surgical History:   Procedure Laterality Date     COLONOSCOPY N/A 6/6/2016    " Procedure: COMBINED COLONOSCOPY, SINGLE OR MULTIPLE BIOPSY/POLYPECTOMY BY BIOPSY;  Surgeon: Theron Vigil MD;  Location: [facility]     LAPAROSCOPIC HERNIORRHAPHY INGUINAL BILATERAL Bilateral 4/15/2019    Procedure: Laparoscopic Bilateral Inguinal Herniorrhaphy with Mesh;  Surgeon: Mercedes Suazo MD;  Location: [facility]     REMOVE FOREIGN BODY LOWER EXTREMITY Left 3/19/2019    Procedure: FOREIGN BODY REMOVAL LEFT KNEE;  Surgeon: Bon Watkins MD;  Location: [facility]       Social History     Tobacco Use     Smoking status: Current Every Day Smoker     Packs/day: 1.00     Smokeless tobacco: Never Used     Tobacco comment: patient states he would like to    Substance Use Topics     Alcohol use: No     Family History   Problem Relation Age of Onset     Hypertension Father      Heart Disease Father      Heart Disease Paternal Grandfather          Current Outpatient Medications   Medication Sig Dispense Refill     ibuprofen (ADVIL/MOTRIN) 600 MG tablet Take 1 tablet (600 mg) by mouth every 6 hours as needed for moderate pain 30 tablet 0     rOPINIRole (REQUIP) 0.25 MG tablet 3 tabs three times daily 189 tablet 3     aspirin-acetaminophen-caffeine (EXCEDRIN MIGRAINE) 250-250-65 MG per tablet Take 1 tablet by mouth every 6 hours as needed for headaches       NAPROXEN PO Take 500 mg by mouth       oxyCODONE-acetaminophen (PERCOCET) 5-325 MG tablet Take 1 tablet by mouth every 6 hours as needed for moderate to severe pain (Patient not taking: Reported on 5/6/2019) 16 tablet 0     No Known Allergies    Reviewed and updated as needed this visit by Provider    Called patient to review restless legs and medications. He is currently taking 0.25 mg 2-3 times daily. This controls the symptoms and allows him to work and do normal activities without feeling agitated from the discomfort from his legs. He has no other acute symptoms of concern. No fevers chills. Eating well and weight is stable. Mood is good with no new anxiety  or depression.          Review of Systems   Constitutional, HEENT, cardiovascular, pulmonary, gi and gu systems are negative, except as otherwise noted.       Objective   Reported vitals:  There were no vitals taken for this visit.   healthy, alert and no distress  PSYCH: Alert and oriented times 3; coherent speech, normal   rate and volume, able to articulate logical thoughts, able   to abstract reason, no tangential thoughts, no hallucinations   or delusions  His affect is normal and pleasant  RESP: No cough, no audible wheezing, able to talk in full sentences  Remainder of exam unable to be completed due to telephone visits            Assessment/Plan:  1. Restless legs syndrome (RLS)  - Symptoms well controlled will continue with current plan of care.   - Discussed signs and symptoms to report should symptoms become worse or sever.   - rOPINIRole (REQUIP) 0.25 MG tablet; 3 tabs three times daily  Dispense: 189 tablet; Refill: 3    Patient instructed to call clinic with new or worsening symptoms prior to her next follow up appointment.   Phone call duration:   7 minutes    Robert Ramirez NP

## 2020-08-30 ENCOUNTER — NURSE TRIAGE (OUTPATIENT)
Dept: NURSING | Facility: CLINIC | Age: 55
End: 2020-08-30

## 2020-08-31 ENCOUNTER — OFFICE VISIT (OUTPATIENT)
Dept: FAMILY MEDICINE | Facility: OTHER | Age: 55
End: 2020-08-31
Payer: COMMERCIAL

## 2020-08-31 VITALS
HEIGHT: 67 IN | SYSTOLIC BLOOD PRESSURE: 100 MMHG | RESPIRATION RATE: 14 BRPM | TEMPERATURE: 97.8 F | WEIGHT: 160 LBS | DIASTOLIC BLOOD PRESSURE: 62 MMHG | BODY MASS INDEX: 25.11 KG/M2 | HEART RATE: 76 BPM

## 2020-08-31 DIAGNOSIS — R10.30 LOWER ABDOMINAL PAIN: ICD-10-CM

## 2020-08-31 DIAGNOSIS — Z98.890 S/P BILATERAL INGUINAL HERNIA REPAIR: ICD-10-CM

## 2020-08-31 DIAGNOSIS — L03.311 CELLULITIS OF ABDOMINAL WALL: Primary | ICD-10-CM

## 2020-08-31 DIAGNOSIS — Z87.19 S/P BILATERAL INGUINAL HERNIA REPAIR: ICD-10-CM

## 2020-08-31 PROCEDURE — 99214 OFFICE O/P EST MOD 30 MIN: CPT | Performed by: PHYSICIAN ASSISTANT

## 2020-08-31 RX ORDER — CEPHALEXIN 500 MG/1
500 CAPSULE ORAL 3 TIMES DAILY
Qty: 21 CAPSULE | Refills: 0 | Status: SHIPPED | OUTPATIENT
Start: 2020-08-31 | End: 2020-09-07

## 2020-08-31 ASSESSMENT — MIFFLIN-ST. JEOR: SCORE: 1515.77

## 2020-08-31 NOTE — TELEPHONE ENCOUNTER
"Caller is Alejandra and pt Reilly  Hx of quadruple hernia surgery last year, has a hernia mesh  Have been experiencing some pain in the last few weeks, depending on what pt wears or any pressure. Also some pain if pt gets up and gets active, Pt states he's been doing hard work but nothing strenuous.  A couple weeks ago, pt has noticed a bulge below the belly button, itchy, a little tender but not much, not very painful, pain comes and goes \"awkward looking\", not symmetrical, Pain level currently a 2 or 3.   Pt reports bulge is growing. Larger today than yesterday.   There is one spot on the bulge felt hard yesterday, not as hard today  There is swelling outlined on the abdomen.  No fever  No vomiting    Care Disposition: See Physician within 24 hours. Care advice given per protocol. Pt verbalizes understanding. Will call back if symptoms worsen. Transferred pt to scheduling for OV tomorrow.     Neela Jamison RN Triage Nurse Advisor 11:03 PM      Reason for Disposition    Can't reduce the hernia (NO pain, local tenderness, or vomiting)    Additional Information    Negative: SEVERE abdominal pain    Negative: Hernia is painful or tender to touch    Negative: [1] Vomiting AND [2] can't reduce the hernia    Negative: [1] Vomiting AND [2] abdomen looks much more swollen than usual    Negative: [1] Swollen lump in groin AND [2] pulsating (like heartbeat)    Negative: Patient sounds very sick or weak to the triager    Negative: [1] Constant abdominal pain AND [2] present > 2 hours  (NO pain or tenderness of hernia)    Protocols used: HERNIA-A-AH      "

## 2020-08-31 NOTE — PROGRESS NOTES
"Subjective     Alejandro Alves is a 54 year old male who presents to clinic today for the following health issues:    HPI       Concern - possible hernia concerns  Onset: 3 days  Description: had surgery on hernia 1 year ago now that area is \"looking funny\"  Intensity: mild  Progression of Symptoms:    Accompanying Signs & Symptoms: tender to the touch  Previous history of similar problem: no  Precipitating factors:        Worsened by: nothing  Alleviating factors:        Improved by: nothing  Therapies tried and outcome:  none     Patient reports he has bilateral inguinal hernia repair in May of 2019. He was seen by general surgery for follow-up shortly after. He reports he has had some ongoing discomfort that would come and go - nothing like it was prior to the surgery. He reports in the last 3 days it looks like his lower abdomen especially on the right side is sticking out more. At times it is tender. He also notice a small sore on his abdomen in the same area that is red and feels hard around it. He denies any testicular pain. No changes with urination or bowel movements. No fevers. Very concerned the hernia recurred or he has a new one.     Review of Systems   Constitutional, HEENT, cardiovascular, pulmonary, gi and gu systems are negative, except as otherwise noted.      Objective    /62   Pulse 76   Temp 97.8  F (36.6  C) (Oral)   Resp 14   Ht 1.696 m (5' 6.77\")   Wt 72.6 kg (160 lb)   BMI 25.23 kg/m    Body mass index is 25.23 kg/m .  Physical Exam   GENERAL: healthy, alert and no distress  ABDOMEN: soft, mild right lower quadrant tenderness around the sore (see physical exam findings below), no rebound or guarding. No evidence of hernia present.    (male): normal male genitalia without lesions or urethral discharge, no hernia  SKIN: Approximately dime sized area of erythema present on the right lower abdomen - this is warm and indurated. No central fluctuance appreciated.  PSYCH: " mentation appears normal and affect normal/bright    No results found for this or any previous visit (from the past 24 hour(s)).        Assessment & Plan     Alejandro was seen today for hernia.    Diagnoses and all orders for this visit:    Cellulitis of abdominal wall  -     cephALEXin (KEFLEX) 500 MG capsule; Take 1 capsule (500 mg) by mouth 3 times daily for 7 days    S/P bilateral inguinal hernia repair  -     CT Abdomen Pelvis w Contrast; Future    Lower abdominal pain  -     CT Abdomen Pelvis w Contrast; Future    Discussed with patient that there are no obvious signs of hernia present today. I do question if the cellulitis and induration from this as causing his symptoms. Will start patient on antibiotics at this time. Discussed keeping affected area clean and dry. We discussed if symptoms not seeming to resolve towards the end of the week he will then scheduled a CT scan for further evaluation. He was advised to monitor for signs of worsening infection including increased redness, warmth, tenderness, or fevers. He will follow-up sooner if this occurs.      The patient indicates understanding of these issues and agrees with the plan.    Juliana Menjivar PA-C  [facility]

## 2020-08-31 NOTE — PATIENT INSTRUCTIONS
Start Keflex three times daily for the next 7 days    If pain not improving over the week - schedule the CT scan    Imaging department in [place]: [phone]    Go into the ER if severe pain, fevers, trouble passing stools or urine

## 2020-10-12 ENCOUNTER — HOSPITAL ENCOUNTER (OUTPATIENT)
Dept: CT IMAGING | Facility: CLINIC | Age: 55
Discharge: HOME | End: 2020-10-12
Attending: PHYSICIAN ASSISTANT | Admitting: PHYSICIAN ASSISTANT
Payer: COMMERCIAL

## 2020-10-12 DIAGNOSIS — R10.30 LOWER ABDOMINAL PAIN: ICD-10-CM

## 2020-10-12 DIAGNOSIS — Z87.19 S/P BILATERAL INGUINAL HERNIA REPAIR: ICD-10-CM

## 2020-10-12 DIAGNOSIS — Z98.890 S/P BILATERAL INGUINAL HERNIA REPAIR: ICD-10-CM

## 2020-10-12 PROCEDURE — 74177 CT ABD & PELVIS W/CONTRAST: CPT

## 2020-10-12 PROCEDURE — 250N000011 HC RX IP 250 OP 636: Performed by: RADIOLOGY

## 2020-10-12 PROCEDURE — 250N000009 HC RX 250: Performed by: RADIOLOGY

## 2020-10-12 RX ORDER — IOPAMIDOL 755 MG/ML
500 INJECTION, SOLUTION INTRAVASCULAR ONCE
Status: COMPLETED | OUTPATIENT
Start: 2020-10-12 | End: 2020-10-12

## 2020-10-12 RX ADMIN — IOPAMIDOL 80 ML: 755 INJECTION, SOLUTION INTRAVENOUS at 15:15

## 2020-10-12 RX ADMIN — SODIUM CHLORIDE 60 ML: 9 INJECTION, SOLUTION INTRAVENOUS at 15:15

## 2020-10-16 NOTE — PROGRESS NOTES
"Subjective     Alejandro Alves is a 54 year old male who presents to clinic today for the following health issues:    History of Present Illness       He eats 0-1 servings of fruits and vegetables daily.He consumes 3 sweetened beverage(s) daily.He exercises with enough effort to increase his heart rate 9 or less minutes per day.  He exercises with enough effort to increase his heart rate 3 or less days per week. He is missing 7 dose(s) of medications per week.  He is not taking prescribed medications regularly due to remembering to take.           Patient would like to discuss CT scan results.      Musculoskeletal problem/pain  Onset/Duration: 1 week  Description  Location: toe - right  Joint Swelling: YES  Redness: YES  Pain: YES  Warmth: YES  Intensity:  5/10  Progression of Symptoms:  improving and same  Accompanying signs and symptoms:   Fevers: no  Numbness/tingling/weakness: no  History  Trauma to the area: YES- stubbed toe  Recent illness:  no  Previous similar problem: no  Previous evaluation:  no  Precipitating or alleviating factors:  Aggravating factors include: wearing shoes  Therapies tried and outcome: aspirin    Review of Systems   Constitutional, HEENT, cardiovascular, pulmonary, gi and gu systems are negative, except as otherwise noted.      Objective    /62   Pulse 85   Temp 97.6  F (36.4  C) (Temporal)   Resp 16   Ht 1.696 m (5' 6.77\")   Wt 74.4 kg (164 lb)   SpO2 99%   BMI 25.86 kg/m    Body mass index is 25.86 kg/m .  Physical Exam   GENERAL: healthy, alert and no distress  MS: significant soft tissue swelling affecting the right small toe, toe is tender to the touch.   SKIN: no suspicious lesions or rashes  PSYCH: mentation appears normal, affect normal/bright    Results for orders placed or performed in visit on 10/19/20   XR Toe Right G/E 2 Views     Status: None    Narrative    TOE RIGHT TWO OR MORE VIEWS 10/19/2020 3:48 PM    HISTORY: Pain of toe of right " "foot.    COMPARISON: None.    FINDINGS: There is a comminuted diaphyseal/proximal diametaphyseal and  metaphyseal fracture of the proximal phalanx of the right little toe  with adjacent callus formation. This does not appear to be  significantly malaligned. No other fracture or malalignment. Joint  spaces are grossly maintained.      Impression    IMPRESSION: Healing subacute comminuted fracture of the diaphysis,  proximal diametaphysis and proximal metaphysis of the right toe.    RAFFI CALL MD           Assessment & Plan     Pain of toe of right foot  Patient placed in walking boot today. He will otherwise continue supportive cares. Discussed repeat xray in 2 weeks if pain not improving.   - XR Toe Right G/E 2 Views    Scrotal pain  CT scan showing a prominent arterial-type vascularity in the left scrotum and inguinal canal. Patient does report pain in this area. Will refer to urology for further evaluation.   - UROLOGY ADULT REFERRAL; Future    Need for prophylactic vaccination and inoculation against influenza  - INFLUENZA QUAD, RECOMBINANT, P-FREE (RIV4) (FLUBLOCK) [73472]  - Vaccine Administration, Initial [17358]     Tobacco Cessation:   reports that he has been smoking. He has been smoking about 1.00 pack per day. He has never used smokeless tobacco.  Tobacco Cessation Action Plan: Information offered: Patient not interested at this time      BMI:   Estimated body mass index is 25.86 kg/m  as calculated from the following:    Height as of this encounter: 1.696 m (5' 6.77\").    Weight as of this encounter: 74.4 kg (164 lb).   Weight management plan: Discussed healthy diet and exercise guidelines    The patient indicates understanding of these issues and agrees with the plan.    Juliana Menjivar PA-C  [facility]    "

## 2020-10-19 ENCOUNTER — ANCILLARY PROCEDURE (OUTPATIENT)
Dept: GENERAL RADIOLOGY | Facility: OTHER | Age: 55
End: 2020-10-19
Attending: PHYSICIAN ASSISTANT
Payer: COMMERCIAL

## 2020-10-19 ENCOUNTER — OFFICE VISIT (OUTPATIENT)
Dept: FAMILY MEDICINE | Facility: OTHER | Age: 55
End: 2020-10-19
Payer: COMMERCIAL

## 2020-10-19 VITALS
HEIGHT: 67 IN | DIASTOLIC BLOOD PRESSURE: 62 MMHG | SYSTOLIC BLOOD PRESSURE: 126 MMHG | WEIGHT: 164 LBS | RESPIRATION RATE: 16 BRPM | HEART RATE: 85 BPM | TEMPERATURE: 97.6 F | OXYGEN SATURATION: 99 % | BODY MASS INDEX: 25.74 KG/M2

## 2020-10-19 DIAGNOSIS — Z23 NEED FOR PROPHYLACTIC VACCINATION AND INOCULATION AGAINST INFLUENZA: ICD-10-CM

## 2020-10-19 DIAGNOSIS — M79.674 PAIN OF TOE OF RIGHT FOOT: Primary | ICD-10-CM

## 2020-10-19 DIAGNOSIS — N50.82 SCROTAL PAIN: ICD-10-CM

## 2020-10-19 PROCEDURE — 99213 OFFICE O/P EST LOW 20 MIN: CPT | Mod: 25 | Performed by: PHYSICIAN ASSISTANT

## 2020-10-19 PROCEDURE — 90471 IMMUNIZATION ADMIN: CPT | Performed by: PHYSICIAN ASSISTANT

## 2020-10-19 PROCEDURE — 90682 RIV4 VACC RECOMBINANT DNA IM: CPT | Performed by: PHYSICIAN ASSISTANT

## 2020-10-19 PROCEDURE — 73660 X-RAY EXAM OF TOE(S): CPT | Mod: RT | Performed by: RADIOLOGY

## 2020-10-19 ASSESSMENT — MIFFLIN-ST. JEOR: SCORE: 1528.91

## 2020-10-19 ASSESSMENT — PAIN SCALES - GENERAL: PAINLEVEL: MODERATE PAIN (5)

## 2020-11-24 ENCOUNTER — VIRTUAL VISIT (OUTPATIENT)
Dept: FAMILY MEDICINE | Facility: OTHER | Age: 55
End: 2020-11-24

## 2020-11-24 ENCOUNTER — NURSE TRIAGE (OUTPATIENT)
Dept: NURSING | Facility: CLINIC | Age: 55
End: 2020-11-24

## 2020-11-24 NOTE — PROGRESS NOTES
"Date: 2020 16:28:12  Clinician: Ector Betancourt  Clinician NPI: [id number]  Patient: Alejandro Alves  Patient : 1965  Patient Address: [address] [place] [address]  Patient Phone: [phone]  Visit Protocol: URI  Patient Summary:  Alejandro is a 55 year old ( : 1965 ) male who initiated a [facility] Visit for COVID-19 (Coronavirus) evaluation and screening. When asked the question \"Please sign me up to receive news, health information and promotions from [facility].\", Alejandro responded \"Yes\".    Alejandro states his symptoms started gradually 3-4 days ago.   His symptoms consist of rhinitis, myalgia, chills, malaise, a headache, and a cough. He is experiencing difficulty breathing due to nasal congestion but he is not short of breath. Alejandro also feels feverish.   Symptom details     Nasal secretions: The color of his mucus is clear.    Cough: Alejandro coughs a few times an hour and his cough is more bothersome at night. Phlegm does not come into his throat when he coughs. He does not believe his cough is caused by post-nasal drip.     Temperature: His current temperature is 98.5 degrees Fahrenheit.     Headache: He states the headache is mild (1-3 on a 10 point pain scale).      Alejandro denies having vomiting, facial pain or pressure, sore throat, teeth pain, ageusia, diarrhea, ear pain, wheezing, nasal congestion, nausea, and anosmia. He also denies taking antibiotic medication in the past month, having recent facial or sinus surgery in the past 60 days, double sickening (worsening symptoms after initial improvement), and having a sinus infection within the past year.   Precipitating events  He has not recently been exposed to someone with influenza. Alejandro has not been in close contact with any high risk individuals.   Pertinent COVID-19 (Coronavirus) information  Alejandro does not work or volunteer as healthcare worker or a . In the past 14 " days, Alejandro has not worked or volunteered at a healthcare facility or group living setting.   In the past 14 days, he also has not lived in a congregate living setting.   Alejandro has not had a close contact with a laboratory-confirmed COVID-19 patient within 14 days of symptom onset.    Since December 2019, Alejandro has not been tested for COVID-19 and has not had upper respiratory infection or influenza-like illness.   Pertinent medical history  Alejandro does not need a return to work/school note.   Weight: 160 lbs   Alejandro smokes or uses smokeless tobacco.   Additional information as reported by the patient (free text): quad hernia surgery April 2019.  currently waiting to be seen for blood vessel issues in groin area   Weight: 160 lbs    MEDICATIONS: Excedrin Migraine oral, ALLERGIES: NKDA  Clinician Response:  Dear Jeromebrandojuan luis,   Your symptoms show that you may have coronavirus (COVID-19). This illness can cause fever, cough and trouble breathing. Many people get a mild case and get better on their own. Some people can get very sick.  What should I do?  We would like to test you for this virus.   1. Please call [phone] to schedule your visit. Explain that you were referred by [facility] to have a COVID-19 test. Be ready to share your [facility] visit ID number.  * If you need to schedule in [facility] please call [phone] or for [employer] employees please call [phone].  * If you need to schedule in the [place] area please call [phone]. [place] employees call [phone].  The following will serve as your written order for this COVID Test, ordered by me, for the indication of suspected COVID [Z20.828]: The test will be ordered in [facility], our electronic health record, after you are scheduled. It will show as ordered and authorized by Emiliano Jamison MD.  Order: COVID-19 (Coronavirus) PCR for SYMPTOMATIC testing from [facility].   2. When it's time for your COVID test:  Stay at least 6 feet  "away from others. (If someone will drive you to your test, stay in the backseat, as far away from the  as you can.)   Cover your mouth and nose with a mask, tissue or washcloth.  Go straight to the testing site. Don't make any stops on the way there or back.      3.Starting now: Stay home and away from others (self-isolate) until:   You've had no fever---and no medicine that reduces fever---for one full day (24 hours). And...   Your other symptoms have gotten better. For example, your cough or breathing has improved. And...   At least 10 days have passed since your symptoms started.       During this time, don't leave the house except for testing or medical care.   Stay in your own room, even for meals. Use your own bathroom if you can.   Stay away from others in your home. No hugging, kissing or shaking hands. No visitors.  Don't go to work, school or anywhere else.    Clean \"high touch\" surfaces often (doorknobs, counters, handles, etc.). Use a household cleaning spray or wipes. You'll find a full list of  on the EPA website: www.epa.gov/pesticide-registration/list-n-disinfectants-use-against-sars-cov-2.   Cover your mouth and nose with a mask, tissue or washcloth to avoid spreading germs.  Wash your hands and face often. Use soap and water.  Caregivers in these groups are at risk for severe illness due to COVID-19:  o People 65 years and older  o People who live in a nursing home or long-term care facility  o People with chronic disease (lung, heart, cancer, diabetes, kidney, liver, immunologic)  o People who have a weakened immune system, including those who:   Are in cancer treatment  Take medicine that weakens the immune system, such as corticosteroids  Had a bone marrow or organ transplant  Have an immune deficiency  Have poorly controlled HIV or AIDS  Are obese (body mass index of 40 or higher)  Smoke regularly   o Caregivers should wear gloves while washing dishes, handling laundry and " cleaning bedrooms and bathrooms.  o Use caution when washing and drying laundry: Don't shake dirty laundry, and use the warmest water setting that you can.  o For more tips, go to www.cdc.gov/coronavirus/2019-ncov/downloads/10Things.pdf.    4.Sign up for [facility]. We know it's scary to hear that you might have COVID-19. We want to track your symptoms to make sure you're okay over the next 2 weeks. Please look for an email from [facility]---this is a free, online program that we'll use to keep in touch. To sign up, follow the link in the email. Learn more at [email or web address]  How can I take care of myself?   Get lots of rest. Drink extra fluids (unless a doctor has told you not to).   Take Tylenol (acetaminophen) for fever or pain. If you have liver or kidney problems, ask your family doctor if it's okay to take Tylenol.   Adults can take either:    650 mg (two 325 mg pills) every 4 to 6 hours, or...   1,000 mg (two 500 mg pills) every 8 hours as needed.    Note: Don't take more than 3,000 mg in one day. Acetaminophen is found in many medicines (both prescribed and over-the-counter medicines). Read all labels to be sure you don't take too much.   For children, check the Tylenol bottle for the right dose. The dose is based on the child's age or weight.    If you have other health problems (like cancer, heart failure, an organ transplant or severe kidney disease): Call your specialty clinic if you don't feel better in the next 2 days.       Know when to call 911. Emergency warning signs include:    Trouble breathing or shortness of breath Pain or pressure in the chest that doesn't go away Feeling confused like you haven't felt before, or not being able to wake up Bluish-colored lips or face.  Where can I get more information?    [facility] -- About COVID-19: [email or web address]   CDC -- What to Do If You're Sick: www.cdc.gov/coronavirus/2019-ncov/about/steps-when-sick.html    CDC -- Ending Home Isolation: www.cdc.gov/coronavirus/2019-ncov/hcp/disposition-in-home-patients.html   CDC -- Caring for Someone: www.cdc.gov/coronavirus/2019-ncov/if-you-are-sick/care-for-someone.html   [facility] -- Interim Guidance for Hospital Discharge to Home: www.health.[facility].[place]./diseases/coronavirus/hcp/hospdischarge.pdf   [facility] clinical trials (COVID-19 research studies): clinicalaffairs.[facility]-clinical-trials    Below are the COVID-19 hotlines at the [place] Department of [facility] ([facility]). Interpreters are available.    For health questions: Call 130-291-8929 or 1-601.919.3028 (7 a.m. to 7 p.m.) For questions about schools and childcare: Call 982-928-6329 or 1-401.432.7720 (7 a.m. to 7 p.m.)    Diagnosis: Contact with and (suspected) exposure to other viral communicable diseases  Diagnosis ICD: Z20.828

## 2020-11-25 DIAGNOSIS — Z20.822 SUSPECTED COVID-19 VIRUS INFECTION: Primary | ICD-10-CM

## 2020-11-28 ENCOUNTER — APPOINTMENT (OUTPATIENT)
Dept: FAMILY MEDICINE | Facility: CLINIC | Age: 55
End: 2020-11-28
Payer: COMMERCIAL

## 2020-11-30 DIAGNOSIS — Z20.822 SUSPECTED COVID-19 VIRUS INFECTION: ICD-10-CM

## 2020-11-30 DIAGNOSIS — Z20.822 SUSPECTED 2019 NOVEL CORONAVIRUS INFECTION: Primary | ICD-10-CM

## 2020-11-30 PROCEDURE — U0003 INFECTIOUS AGENT DETECTION BY NUCLEIC ACID (DNA OR RNA); SEVERE ACUTE RESPIRATORY SYNDROME CORONAVIRUS 2 (SARS-COV-2) (CORONAVIRUS DISEASE [COVID-19]), AMPLIFIED PROBE TECHNIQUE, MAKING USE OF HIGH THROUGHPUT TECHNOLOGIES AS DESCRIBED BY CMS-2020-01-R: HCPCS | Performed by: FAMILY MEDICINE

## 2020-12-02 LAB
SARS-COV-2 RNA SPEC QL NAA+PROBE: ABNORMAL
SPECIMEN SOURCE: ABNORMAL

## 2021-01-15 ENCOUNTER — HEALTH MAINTENANCE LETTER (OUTPATIENT)
Age: 56
End: 2021-01-15

## 2021-05-29 ENCOUNTER — RECORDS - HEALTHEAST (OUTPATIENT)
Dept: ADMINISTRATIVE | Facility: CLINIC | Age: 56
End: 2021-05-29

## 2021-10-24 ENCOUNTER — HEALTH MAINTENANCE LETTER (OUTPATIENT)
Age: 56
End: 2021-10-24

## 2022-02-13 ENCOUNTER — HEALTH MAINTENANCE LETTER (OUTPATIENT)
Age: 57
End: 2022-02-13

## 2022-09-14 NOTE — OR NURSING
Attempted to call pt. x3 listed numbers unable to reach pt, left message re: Dr. Suazo request to start surgery earlier at 1300 instead of 1430. Awaiting return call. Miriam HOLGUIN  
.

## 2022-10-15 ENCOUNTER — HEALTH MAINTENANCE LETTER (OUTPATIENT)
Age: 57
End: 2022-10-15

## 2023-03-26 ENCOUNTER — HEALTH MAINTENANCE LETTER (OUTPATIENT)
Age: 58
End: 2023-03-26

## 2023-05-09 ENCOUNTER — HOSPITAL ENCOUNTER (EMERGENCY)
Facility: CLINIC | Age: 58
Discharge: ANOTHER HOSPITAL | End: 2023-05-10
Attending: NURSE PRACTITIONER | Admitting: NURSE PRACTITIONER
Payer: COMMERCIAL

## 2023-05-09 ENCOUNTER — APPOINTMENT (OUTPATIENT)
Dept: CT IMAGING | Facility: CLINIC | Age: 58
End: 2023-05-09
Attending: NURSE PRACTITIONER
Payer: COMMERCIAL

## 2023-05-09 DIAGNOSIS — J36 PERITONSILLAR ABSCESS: ICD-10-CM

## 2023-05-09 LAB
ALBUMIN SERPL BCG-MCNC: 3.7 G/DL (ref 3.5–5.2)
ALP SERPL-CCNC: 83 U/L (ref 40–129)
ALT SERPL W P-5'-P-CCNC: 25 U/L (ref 10–50)
ANION GAP SERPL CALCULATED.3IONS-SCNC: 10 MMOL/L (ref 7–15)
AST SERPL W P-5'-P-CCNC: 19 U/L (ref 10–50)
BASOPHILS # BLD AUTO: 0 10E3/UL (ref 0–0.2)
BASOPHILS NFR BLD AUTO: 0 %
BILIRUB SERPL-MCNC: 0.5 MG/DL
BUN SERPL-MCNC: 8.4 MG/DL (ref 6–20)
CALCIUM SERPL-MCNC: 8.8 MG/DL (ref 8.6–10)
CHLORIDE SERPL-SCNC: 99 MMOL/L (ref 98–107)
CREAT SERPL-MCNC: 0.64 MG/DL (ref 0.67–1.17)
DEPRECATED HCO3 PLAS-SCNC: 25 MMOL/L (ref 22–29)
DEPRECATED S PYO AG THROAT QL EIA: NEGATIVE
EOSINOPHIL # BLD AUTO: 0.1 10E3/UL (ref 0–0.7)
EOSINOPHIL NFR BLD AUTO: 1 %
ERYTHROCYTE [DISTWIDTH] IN BLOOD BY AUTOMATED COUNT: 12.8 % (ref 10–15)
FLUAV RNA SPEC QL NAA+PROBE: NEGATIVE
FLUBV RNA RESP QL NAA+PROBE: NEGATIVE
GFR SERPL CREATININE-BSD FRML MDRD: >90 ML/MIN/1.73M2
GLUCOSE SERPL-MCNC: 113 MG/DL (ref 70–99)
HCT VFR BLD AUTO: 45.4 % (ref 40–53)
HGB BLD-MCNC: 15.1 G/DL (ref 13.3–17.7)
IMM GRANULOCYTES # BLD: 0 10E3/UL
IMM GRANULOCYTES NFR BLD: 0 %
LYMPHOCYTES # BLD AUTO: 1.1 10E3/UL (ref 0.8–5.3)
LYMPHOCYTES NFR BLD AUTO: 10 %
MCH RBC QN AUTO: 31.1 PG (ref 26.5–33)
MCHC RBC AUTO-ENTMCNC: 33.3 G/DL (ref 31.5–36.5)
MCV RBC AUTO: 94 FL (ref 78–100)
MONOCYTES # BLD AUTO: 0.7 10E3/UL (ref 0–1.3)
MONOCYTES NFR BLD AUTO: 7 %
NEUTROPHILS # BLD AUTO: 8.3 10E3/UL (ref 1.6–8.3)
NEUTROPHILS NFR BLD AUTO: 82 %
NRBC # BLD AUTO: 0 10E3/UL
NRBC BLD AUTO-RTO: 0 /100
PLATELET # BLD AUTO: 213 10E3/UL (ref 150–450)
POTASSIUM SERPL-SCNC: 4.2 MMOL/L (ref 3.4–5.3)
PROT SERPL-MCNC: 6.6 G/DL (ref 6.4–8.3)
RBC # BLD AUTO: 4.85 10E6/UL (ref 4.4–5.9)
RSV RNA SPEC NAA+PROBE: NEGATIVE
SARS-COV-2 RNA RESP QL NAA+PROBE: NEGATIVE
SODIUM SERPL-SCNC: 134 MMOL/L (ref 136–145)
WBC # BLD AUTO: 10.3 10E3/UL (ref 4–11)

## 2023-05-09 PROCEDURE — 99285 EMERGENCY DEPT VISIT HI MDM: CPT | Mod: CS,25 | Performed by: NURSE PRACTITIONER

## 2023-05-09 PROCEDURE — C9803 HOPD COVID-19 SPEC COLLECT: HCPCS | Performed by: NURSE PRACTITIONER

## 2023-05-09 PROCEDURE — 99285 EMERGENCY DEPT VISIT HI MDM: CPT | Mod: CS | Performed by: NURSE PRACTITIONER

## 2023-05-09 PROCEDURE — 250N000011 HC RX IP 250 OP 636: Performed by: NURSE PRACTITIONER

## 2023-05-09 PROCEDURE — 70491 CT SOFT TISSUE NECK W/DYE: CPT

## 2023-05-09 PROCEDURE — 96375 TX/PRO/DX INJ NEW DRUG ADDON: CPT | Performed by: NURSE PRACTITIONER

## 2023-05-09 PROCEDURE — 87651 STREP A DNA AMP PROBE: CPT | Performed by: NURSE PRACTITIONER

## 2023-05-09 PROCEDURE — 85025 COMPLETE CBC W/AUTO DIFF WBC: CPT | Performed by: NURSE PRACTITIONER

## 2023-05-09 PROCEDURE — 87637 SARSCOV2&INF A&B&RSV AMP PRB: CPT | Performed by: NURSE PRACTITIONER

## 2023-05-09 PROCEDURE — 96365 THER/PROPH/DIAG IV INF INIT: CPT | Mod: 59 | Performed by: NURSE PRACTITIONER

## 2023-05-09 PROCEDURE — 36415 COLL VENOUS BLD VENIPUNCTURE: CPT | Performed by: NURSE PRACTITIONER

## 2023-05-09 PROCEDURE — 80053 COMPREHEN METABOLIC PANEL: CPT | Performed by: NURSE PRACTITIONER

## 2023-05-09 PROCEDURE — 250N000009 HC RX 250: Performed by: NURSE PRACTITIONER

## 2023-05-09 RX ORDER — DEXAMETHASONE SODIUM PHOSPHATE 10 MG/ML
10 INJECTION, SOLUTION INTRAMUSCULAR; INTRAVENOUS EVERY 8 HOURS
Status: DISCONTINUED | OUTPATIENT
Start: 2023-05-10 | End: 2023-05-09

## 2023-05-09 RX ORDER — KETOROLAC TROMETHAMINE 15 MG/ML
15 INJECTION, SOLUTION INTRAMUSCULAR; INTRAVENOUS ONCE
Status: COMPLETED | OUTPATIENT
Start: 2023-05-09 | End: 2023-05-09

## 2023-05-09 RX ORDER — DEXAMETHASONE SODIUM PHOSPHATE 10 MG/ML
10 INJECTION, SOLUTION INTRAMUSCULAR; INTRAVENOUS EVERY 8 HOURS
Status: DISCONTINUED | OUTPATIENT
Start: 2023-05-10 | End: 2023-05-10 | Stop reason: HOSPADM

## 2023-05-09 RX ORDER — HYDROMORPHONE HYDROCHLORIDE 1 MG/ML
0.5 INJECTION, SOLUTION INTRAMUSCULAR; INTRAVENOUS; SUBCUTANEOUS
Status: COMPLETED | OUTPATIENT
Start: 2023-05-09 | End: 2023-05-10

## 2023-05-09 RX ORDER — ONDANSETRON 2 MG/ML
4 INJECTION INTRAMUSCULAR; INTRAVENOUS ONCE
Status: COMPLETED | OUTPATIENT
Start: 2023-05-09 | End: 2023-05-09

## 2023-05-09 RX ORDER — DEXAMETHASONE SODIUM PHOSPHATE 10 MG/ML
10 INJECTION, SOLUTION INTRAMUSCULAR; INTRAVENOUS EVERY 6 HOURS
Status: DISCONTINUED | OUTPATIENT
Start: 2023-05-10 | End: 2023-05-09

## 2023-05-09 RX ORDER — CLINDAMYCIN PHOSPHATE 900 MG/50ML
900 INJECTION, SOLUTION INTRAVENOUS EVERY 6 HOURS
Status: DISCONTINUED | OUTPATIENT
Start: 2023-05-09 | End: 2023-05-10 | Stop reason: HOSPADM

## 2023-05-09 RX ORDER — IOPAMIDOL 755 MG/ML
500 INJECTION, SOLUTION INTRAVASCULAR ONCE
Status: COMPLETED | OUTPATIENT
Start: 2023-05-09 | End: 2023-05-09

## 2023-05-09 RX ORDER — DEXAMETHASONE SODIUM PHOSPHATE 10 MG/ML
10 INJECTION, SOLUTION INTRAMUSCULAR; INTRAVENOUS ONCE
Status: COMPLETED | OUTPATIENT
Start: 2023-05-09 | End: 2023-05-09

## 2023-05-09 RX ADMIN — CLINDAMYCIN PHOSPHATE 900 MG: 900 INJECTION, SOLUTION INTRAVENOUS at 22:26

## 2023-05-09 RX ADMIN — DEXAMETHASONE SODIUM PHOSPHATE 10 MG: 10 INJECTION, SOLUTION INTRAMUSCULAR; INTRAVENOUS at 20:38

## 2023-05-09 RX ADMIN — ONDANSETRON 4 MG: 2 INJECTION INTRAMUSCULAR; INTRAVENOUS at 20:38

## 2023-05-09 RX ADMIN — KETOROLAC TROMETHAMINE 15 MG: 15 INJECTION, SOLUTION INTRAMUSCULAR; INTRAVENOUS at 20:38

## 2023-05-09 RX ADMIN — IOPAMIDOL 80 ML: 755 INJECTION, SOLUTION INTRAVENOUS at 20:50

## 2023-05-09 RX ADMIN — HYDROMORPHONE HYDROCHLORIDE 0.5 MG: 1 INJECTION, SOLUTION INTRAMUSCULAR; INTRAVENOUS; SUBCUTANEOUS at 20:38

## 2023-05-09 RX ADMIN — SODIUM CHLORIDE 70 ML: 9 INJECTION, SOLUTION INTRAVENOUS at 20:50

## 2023-05-09 ASSESSMENT — ACTIVITIES OF DAILY LIVING (ADL)
ADLS_ACUITY_SCORE: 35
ADLS_ACUITY_SCORE: 35

## 2023-05-10 ENCOUNTER — HOSPITAL ENCOUNTER (INPATIENT)
Facility: CLINIC | Age: 58
LOS: 2 days | Discharge: HOME | DRG: 153 | End: 2023-05-12
Attending: INTERNAL MEDICINE | Admitting: INTERNAL MEDICINE
Payer: COMMERCIAL

## 2023-05-10 VITALS
DIASTOLIC BLOOD PRESSURE: 87 MMHG | HEART RATE: 66 BPM | TEMPERATURE: 98 F | SYSTOLIC BLOOD PRESSURE: 104 MMHG | RESPIRATION RATE: 18 BRPM | WEIGHT: 160 LBS | OXYGEN SATURATION: 95 % | BODY MASS INDEX: 25.23 KG/M2

## 2023-05-10 DIAGNOSIS — J36 PERITONSILLAR ABSCESS: Primary | ICD-10-CM

## 2023-05-10 LAB
CRP SERPL-MCNC: 79.94 MG/L
GROUP A STREP BY PCR: NOT DETECTED

## 2023-05-10 PROCEDURE — 250N000009 HC RX 250: Performed by: OTOLARYNGOLOGY

## 2023-05-10 PROCEDURE — 250N000011 HC RX IP 250 OP 636: Performed by: INTERNAL MEDICINE

## 2023-05-10 PROCEDURE — 250N000011 HC RX IP 250 OP 636: Performed by: NURSE PRACTITIONER

## 2023-05-10 PROCEDURE — 250N000013 HC RX MED GY IP 250 OP 250 PS 637: Performed by: INTERNAL MEDICINE

## 2023-05-10 PROCEDURE — 0CJY8ZZ INSPECTION OF MOUTH AND THROAT, VIA NATURAL OR ARTIFICIAL OPENING ENDOSCOPIC: ICD-10-PCS | Performed by: OTOLARYNGOLOGY

## 2023-05-10 PROCEDURE — 36415 COLL VENOUS BLD VENIPUNCTURE: CPT | Performed by: HOSPITALIST

## 2023-05-10 PROCEDURE — 99222 1ST HOSP IP/OBS MODERATE 55: CPT | Performed by: INTERNAL MEDICINE

## 2023-05-10 PROCEDURE — 120N000001 HC R&B MED SURG/OB

## 2023-05-10 PROCEDURE — 258N000003 HC RX IP 258 OP 636: Performed by: INTERNAL MEDICINE

## 2023-05-10 PROCEDURE — 96366 THER/PROPH/DIAG IV INF ADDON: CPT | Performed by: NURSE PRACTITIONER

## 2023-05-10 PROCEDURE — 86140 C-REACTIVE PROTEIN: CPT | Performed by: HOSPITALIST

## 2023-05-10 PROCEDURE — 96376 TX/PRO/DX INJ SAME DRUG ADON: CPT | Performed by: NURSE PRACTITIONER

## 2023-05-10 PROCEDURE — 99207 PR APP CREDIT; MD BILLING SHARED VISIT: CPT | Performed by: HOSPITALIST

## 2023-05-10 RX ORDER — ONDANSETRON 2 MG/ML
4 INJECTION INTRAMUSCULAR; INTRAVENOUS EVERY 6 HOURS PRN
Status: DISCONTINUED | OUTPATIENT
Start: 2023-05-10 | End: 2023-05-12 | Stop reason: HOSPADM

## 2023-05-10 RX ORDER — LIDOCAINE HYDROCHLORIDE 20 MG/ML
5 SOLUTION OROPHARYNGEAL ONCE
Status: COMPLETED | OUTPATIENT
Start: 2023-05-10 | End: 2023-05-10

## 2023-05-10 RX ORDER — ACETAMINOPHEN 325 MG/1
650 TABLET ORAL EVERY 6 HOURS PRN
Status: DISCONTINUED | OUTPATIENT
Start: 2023-05-10 | End: 2023-05-12 | Stop reason: HOSPADM

## 2023-05-10 RX ORDER — AMPICILLIN AND SULBACTAM 2; 1 G/1; G/1
3 INJECTION, POWDER, FOR SOLUTION INTRAMUSCULAR; INTRAVENOUS EVERY 6 HOURS
Status: DISCONTINUED | OUTPATIENT
Start: 2023-05-10 | End: 2023-05-12 | Stop reason: HOSPADM

## 2023-05-10 RX ORDER — BISACODYL 10 MG
10 SUPPOSITORY, RECTAL RECTAL DAILY PRN
Status: DISCONTINUED | OUTPATIENT
Start: 2023-05-10 | End: 2023-05-12 | Stop reason: HOSPADM

## 2023-05-10 RX ORDER — NALOXONE HYDROCHLORIDE 0.4 MG/ML
0.4 INJECTION, SOLUTION INTRAMUSCULAR; INTRAVENOUS; SUBCUTANEOUS
Status: DISCONTINUED | OUTPATIENT
Start: 2023-05-10 | End: 2023-05-12 | Stop reason: HOSPADM

## 2023-05-10 RX ORDER — NALOXONE HYDROCHLORIDE 0.4 MG/ML
0.2 INJECTION, SOLUTION INTRAMUSCULAR; INTRAVENOUS; SUBCUTANEOUS
Status: DISCONTINUED | OUTPATIENT
Start: 2023-05-10 | End: 2023-05-12 | Stop reason: HOSPADM

## 2023-05-10 RX ORDER — LIDOCAINE 40 MG/G
CREAM TOPICAL
Status: DISCONTINUED | OUTPATIENT
Start: 2023-05-10 | End: 2023-05-12 | Stop reason: HOSPADM

## 2023-05-10 RX ORDER — OXYCODONE HYDROCHLORIDE 5 MG/1
5 TABLET ORAL EVERY 4 HOURS PRN
Status: DISCONTINUED | OUTPATIENT
Start: 2023-05-10 | End: 2023-05-12 | Stop reason: HOSPADM

## 2023-05-10 RX ORDER — SODIUM CHLORIDE, SODIUM LACTATE, POTASSIUM CHLORIDE, CALCIUM CHLORIDE 600; 310; 30; 20 MG/100ML; MG/100ML; MG/100ML; MG/100ML
INJECTION, SOLUTION INTRAVENOUS CONTINUOUS
Status: DISCONTINUED | OUTPATIENT
Start: 2023-05-10 | End: 2023-05-10

## 2023-05-10 RX ORDER — ONDANSETRON 4 MG/1
4 TABLET, ORALLY DISINTEGRATING ORAL EVERY 6 HOURS PRN
Status: DISCONTINUED | OUTPATIENT
Start: 2023-05-10 | End: 2023-05-12 | Stop reason: HOSPADM

## 2023-05-10 RX ORDER — POLYETHYLENE GLYCOL 3350 17 G/17G
17 POWDER, FOR SOLUTION ORAL DAILY PRN
Status: DISCONTINUED | OUTPATIENT
Start: 2023-05-10 | End: 2023-05-12 | Stop reason: HOSPADM

## 2023-05-10 RX ORDER — DEXAMETHASONE SODIUM PHOSPHATE 10 MG/ML
10 INJECTION, SOLUTION INTRAMUSCULAR; INTRAVENOUS EVERY 8 HOURS
Status: DISCONTINUED | OUTPATIENT
Start: 2023-05-10 | End: 2023-05-11

## 2023-05-10 RX ORDER — PROCHLORPERAZINE 25 MG
25 SUPPOSITORY, RECTAL RECTAL EVERY 12 HOURS PRN
Status: DISCONTINUED | OUTPATIENT
Start: 2023-05-10 | End: 2023-05-12 | Stop reason: HOSPADM

## 2023-05-10 RX ORDER — HYDROMORPHONE HCL IN WATER/PF 6 MG/30 ML
0.2 PATIENT CONTROLLED ANALGESIA SYRINGE INTRAVENOUS
Status: DISCONTINUED | OUTPATIENT
Start: 2023-05-10 | End: 2023-05-12 | Stop reason: HOSPADM

## 2023-05-10 RX ORDER — ACETAMINOPHEN 650 MG/1
650 SUPPOSITORY RECTAL EVERY 6 HOURS PRN
Status: DISCONTINUED | OUTPATIENT
Start: 2023-05-10 | End: 2023-05-12 | Stop reason: HOSPADM

## 2023-05-10 RX ORDER — PROCHLORPERAZINE MALEATE 10 MG
10 TABLET ORAL EVERY 6 HOURS PRN
Status: DISCONTINUED | OUTPATIENT
Start: 2023-05-10 | End: 2023-05-12 | Stop reason: HOSPADM

## 2023-05-10 RX ORDER — HYDROMORPHONE HCL IN WATER/PF 6 MG/30 ML
0.4 PATIENT CONTROLLED ANALGESIA SYRINGE INTRAVENOUS
Status: DISCONTINUED | OUTPATIENT
Start: 2023-05-10 | End: 2023-05-12 | Stop reason: HOSPADM

## 2023-05-10 RX ADMIN — DEXAMETHASONE SODIUM PHOSPHATE 10 MG: 10 INJECTION, SOLUTION INTRAMUSCULAR; INTRAVENOUS at 10:37

## 2023-05-10 RX ADMIN — CLINDAMYCIN PHOSPHATE 900 MG: 900 INJECTION, SOLUTION INTRAVENOUS at 03:30

## 2023-05-10 RX ADMIN — DEXAMETHASONE SODIUM PHOSPHATE 10 MG: 10 INJECTION, SOLUTION INTRAMUSCULAR; INTRAVENOUS at 18:14

## 2023-05-10 RX ADMIN — HYDROMORPHONE HYDROCHLORIDE 0.5 MG: 1 INJECTION, SOLUTION INTRAMUSCULAR; INTRAVENOUS; SUBCUTANEOUS at 04:24

## 2023-05-10 RX ADMIN — AMPICILLIN SODIUM AND SULBACTAM SODIUM 3 G: 2; 1 INJECTION, POWDER, FOR SOLUTION INTRAMUSCULAR; INTRAVENOUS at 12:10

## 2023-05-10 RX ADMIN — AMPICILLIN SODIUM AND SULBACTAM SODIUM 3 G: 2; 1 INJECTION, POWDER, FOR SOLUTION INTRAMUSCULAR; INTRAVENOUS at 06:48

## 2023-05-10 RX ADMIN — ACETAMINOPHEN 650 MG: 325 TABLET ORAL at 18:20

## 2023-05-10 RX ADMIN — SODIUM CHLORIDE, POTASSIUM CHLORIDE, SODIUM LACTATE AND CALCIUM CHLORIDE: 600; 310; 30; 20 INJECTION, SOLUTION INTRAVENOUS at 06:48

## 2023-05-10 RX ADMIN — AMPICILLIN SODIUM AND SULBACTAM SODIUM 3 G: 2; 1 INJECTION, POWDER, FOR SOLUTION INTRAMUSCULAR; INTRAVENOUS at 23:46

## 2023-05-10 RX ADMIN — LIDOCAINE HYDROCHLORIDE 5 ML: 20 SOLUTION ORAL; TOPICAL at 13:17

## 2023-05-10 RX ADMIN — DEXAMETHASONE SODIUM PHOSPHATE 10 MG: 10 INJECTION, SOLUTION INTRAMUSCULAR; INTRAVENOUS at 03:25

## 2023-05-10 RX ADMIN — HYDROMORPHONE HYDROCHLORIDE 0.5 MG: 1 INJECTION, SOLUTION INTRAMUSCULAR; INTRAVENOUS; SUBCUTANEOUS at 03:25

## 2023-05-10 RX ADMIN — AMPICILLIN SODIUM AND SULBACTAM SODIUM 3 G: 2; 1 INJECTION, POWDER, FOR SOLUTION INTRAMUSCULAR; INTRAVENOUS at 18:14

## 2023-05-10 ASSESSMENT — ACTIVITIES OF DAILY LIVING (ADL)
ADLS_ACUITY_SCORE: 35

## 2023-05-10 NOTE — ED TRIAGE NOTES
Patient arrives via EMS with sore throat, L ear pain and dizziness.      Triage Assessment     Row Name 05/09/23 1958       Triage Assessment (Adult)    Airway WDL WDL       Respiratory WDL    Respiratory WDL WDL       Skin Circulation/Temperature WDL    Skin Circulation/Temperature WDL WDL       Cardiac WDL    Cardiac WDL WDL

## 2023-05-10 NOTE — PLAN OF CARE
Goal Outcome Evaluation: PT arrived from [facility] via EMS at 0552. Here for peritonsillar abscess. Strict NPO. A&O, VSS on RA. Pain controlled with dilaudid from [facility]. IV abx. /hr. Resting comfortably in bed.       Plan of Care Reviewed With: patient    Overall Patient Progress: no changeOverall Patient Progress: no change

## 2023-05-10 NOTE — PLAN OF CARE
"Goal Outcome Evaluation: Dx: peritonsillar abscess.Pt sleepy most of shift. A/O x4, however, RN notes some inability to stay on topic varying thoughts, slightly rapid speech and comments not pertaining to context of conversation. (Please see note re: sisters concerns) Pt is pleasant and cooperative. ENT consult demonstrates small improving left tonsillar abscess. Conservative mgmt with IV ABX and IV steroids. Adequate UOP, pt reports last BM Sunday 5/7 which is not unusual for him; declines any intervention this time. Pt had Tylenol x1. Per MD- avoid narcotics, administer Tylenol, Oxycodone if no relief w/ tylenol. Up IND in room. Pt endorses some dizziness'- this afternoon pt \"coughed really  hard and got clammy\". VSS at them time, denies chest pain, nausea, tingling, pressure. (Pt has baseline numbness LE's). Plan: continue IV steroids/ABX, OT consult??      Plan of Care Reviewed With: patient, sibling                 "

## 2023-05-10 NOTE — ED PROVIDER NOTES
Patient was signed out to me by my colleague.  I reevaluated him slightly after midnight.  Patient now is able to talk easier and is feeling slightly better.  In general trending in the right direction.  A bed became available at [facility].  I do think he is stable for a medical bed.  Graciously accepted by hospitalist Dr. Ritchie.  Patient transferred.     Shravan Meyer MD  05/10/23 031

## 2023-05-10 NOTE — CONSULTS
"[facility] Consultation [facility]    Alejandro Alves MRN# [id number]   Age: 57 year old YOB: 1965     Date of Admission:  5/10/2023  Date of Consult:    05/10/23    Reason for consult: Peritonsillar Abscess       Requesting physician: Eliu Ritchie MD                           Chief Complaint:   No chief complaint on file.              HPI:      Alejandro Alves is a 57 year old male with past medical history of tobacco use disorder who presents initially to [facility] with left-sided throat pain, difficulty swallowing, transferred to [facility] for ENT evaluation for peritonsillar abscess.  Admitted on 5/10/2023.     Left peritonsillar abscess  *Presents with left-sided throat pain and swelling, difficulty swallowing, difficulty speaking  *CT soft tissue neck with enlargement of left palate teen tonsillar tissues with narrowing of airway at level of oropharynx, irregularly-shaped area of hypoenhancement measuring 2 x 1.5 x 1 cm compatible with developing left peritonsillar abscess, presumed reactive lymphadenopathy  *ENT covering [facility] recommended trial of medical management with IV dexamethasone and IV antibiotics.  *Symptoms significantly improved since initiation of steroids and antibiotics  - NPO until seen by ENT  - ENT consulted  - Dexamethasone 10 mg IV q8H  - Ampicillin-sulbactam IV     Tobacco use disorder  - Encouraged cessation  - Nicotine replacement declined     Today, pt notes improved pain control since Initiation of antibiotic and steroid. He has been kept NPO and has not made efforts to swallow. He has no history of peritonsillar abscess or tonsillitis.  Denies, changes in voice, shortness of breath.  Previous tests and diagnostic procedures: see \"Tests and Procedures\" and \"PFSH\".               Past Medical History:     Past Medical History:   Diagnosis Date     Tobacco use disorder                Past Surgical History: "     Past Surgical History:   Procedure Laterality Date     COLONOSCOPY N/A 6/6/2016    Procedure: COMBINED COLONOSCOPY, SINGLE OR MULTIPLE BIOPSY/POLYPECTOMY BY BIOPSY;  Surgeon: Theron Vigil MD;  Location:  GI     LAPAROSCOPIC HERNIORRHAPHY INGUINAL BILATERAL Bilateral 4/15/2019    Procedure: Laparoscopic Bilateral Inguinal Herniorrhaphy with Mesh;  Surgeon: Mercedes Suazo MD;  Location: [facility] OR     REMOVE FOREIGN BODY LOWER EXTREMITY Left 3/19/2019    Procedure: FOREIGN BODY REMOVAL LEFT KNEE;  Surgeon: Bon Watkins MD;  Location: [facility] OR               Social History:     Social History     Socioeconomic History     Marital status: Single     Spouse name: Not on file     Number of children: Not on file     Years of education: Not on file     Highest education level: Not on file   Occupational History     Not on file   Tobacco Use     Smoking status: Every Day     Packs/day: 1.00     Types: Cigarettes     Smokeless tobacco: Never     Tobacco comments:     patient states he would like to    Vaping Use     Vaping status: Not on file   Substance and Sexual Activity     Alcohol use: No     Drug use: Not Currently     Types: Cocaine     Comment: minimal     Sexual activity: Yes     Partners: Female     Birth control/protection: None   Other Topics Concern     Parent/sibling w/ CABG, MI or angioplasty before 65F 55M? Not Asked   Social History Narrative     Not on file     Social Determinants of Health     Financial Resource Strain: Not on file   Food Insecurity: Not on file   Transportation Needs: Not on file   Physical Activity: Not on file   Stress: Not on file   Social Connections: Not on file   Intimate Partner Violence: Not on file   Housing Stability: Not on file               Family History:     Family History   Problem Relation Age of Onset     Hypertension Father      Heart Disease Father      Heart Disease Paternal Grandfather                Immunizations:     Immunization History    Administered Date(s) Administered     Influenza Vaccine 50-64 or 18-64 w/egg allergy (Flublok) 11/26/2018, 10/19/2020     Pneumo Conj 13-V (2010&after) 11/26/2018     TD,PF 7+ (Tenivac) 11/26/2018               Allergies:   Patient has no known allergies.          Medications:     Current Facility-Administered Medications:      acetaminophen (TYLENOL) tablet 650 mg, 650 mg, Oral, Q6H PRN **OR** acetaminophen (TYLENOL) Suppository 650 mg, 650 mg, Rectal, Q6H PRN, Eliu Ritchie MD     ampicillin-sulbactam (UNASYN) 3 g vial to attach to  mL bag, 3 g, Intravenous, Q6H, Eliu Ritchie MD, 3 g at 05/10/23 0648     bisacodyl (DULCOLAX) suppository 10 mg, 10 mg, Rectal, Daily PRN, Eliu Ritchie MD     dexamethasone PF (DECADRON) injection 10 mg, 10 mg, Intravenous, Q8H, Eliu Ritchie MD     HYDROmorphone (DILAUDID) injection 0.2 mg, 0.2 mg, Intravenous, Q2H PRN, Eliu Ritchie MD     HYDROmorphone (DILAUDID) injection 0.4 mg, 0.4 mg, Intravenous, Q2H PRN, Eliu Ritchie MD     lactated ringers infusion, , Intravenous, Continuous, Eliu Ritchie MD, Last Rate: 125 mL/hr at 05/10/23 0648, New Bag at 05/10/23 0648     lidocaine (LMX4) cream, , Topical, Q1H PRN, Eliu Ritchie MD     lidocaine 1 % 0.1-1 mL, 0.1-1 mL, Other, Q1H PRN, Eliu Ritchie MD     melatonin tablet 1 mg, 1 mg, Oral, At Bedtime PRN, Eliu Ritchie MD     naloxone (NARCAN) injection 0.2 mg, 0.2 mg, Intravenous, Q2 Min PRN **OR** naloxone (NARCAN) injection 0.4 mg, 0.4 mg, Intravenous, Q2 Min PRN **OR** naloxone (NARCAN) injection 0.2 mg, 0.2 mg, Intramuscular, Q2 Min PRN **OR** naloxone (NARCAN) injection 0.4 mg, 0.4 mg, Intramuscular, Q2 Min PRN, Eliu Ritchie MD     ondansetron (ZOFRAN ODT) ODT tab 4 mg, 4 mg, Oral, Q6H PRN **OR** ondansetron (ZOFRAN) injection 4 mg, 4 mg, Intravenous, Q6H PRN, Eliu Ritchie MD     oxyCODONE (ROXICODONE) tablet 5  mg, 5 mg, Oral, Q4H PRN, Eliu Ritchie MD     oxyCODONE IR (ROXICODONE) half-tab 2.5 mg, 2.5 mg, Oral, Q4H PRN, Eliu Ritchie MD     polyethylene glycol (MIRALAX) Packet 17 g, 17 g, Oral, Daily PRN, Eliu Ritchie MD     prochlorperazine (COMPAZINE) injection 10 mg, 10 mg, Intravenous, Q6H PRN **OR** prochlorperazine (COMPAZINE) tablet 10 mg, 10 mg, Oral, Q6H PRN **OR** prochlorperazine (COMPAZINE) suppository 25 mg, 25 mg, Rectal, Q12H PRN, Eliu Ritchie MD     sodium chloride (PF) 0.9% PF flush 3 mL, 3 mL, Intracatheter, Q8H, Eliu Ritchie MD, 3 mL at 05/10/23 0653     sodium chloride (PF) 0.9% PF flush 3 mL, 3 mL, Intracatheter, q1 min prn, Eliu Ritchie MD          Review of Systems:   Review Of Systems  Skin: negative  Eyes: negative  Ears/Nose/Throat: as above  Respiratory: No shortness of breath, dyspnea on exertion, cough, or hemoptysis  Cardiovascular: negative  Gastrointestinal: negative  Genitourinary: negative  Musculoskeletal: negative  Neurologic: negative  Psychiatric: negative  Hematologic/Lymphatic/Immunologic: negative  Endocrine: negative          Physical exam:   CONSTITUTION:    /73   Pulse 76   Temp 97.5  F (36.4  C) (Oral)   Resp 17   SpO2 96%      General appearance:Well developed, well nourished and groomed. No apparent acute or chronic distress.    Ability to communicate: normal.       HEAD, FACE, SALIVARY GLANDS AND TMJ:    Inspection of Head and Face: Normal contour and symmetry. No masses, lesions, or significant scars observed.    Palpation/Percussion of the Face: No tenderness, deformity or instability.    Palpation of Parotid and Submaxillary glands: Normal.    Facial Mobility: Normal.       EYES: Ocular Motility: gaze appears conjugate in all positions; no evident nystagmus.       EAR, NOSE, MOUTH AND THROAT:    Pinnas and External Nose: Normal.    Otoscopic exam: Normal canals and tympanic membranes, bilaterally.     Hearing: Conversational speech rarely or never misunderstands words.    Nasal Interior:    Septum - right of midline.    Turbinates and middle meatus - Normal.    Rhinorrhea - watery.    Vestibular skin - Normal bilaterally.    Mucosa - Normal.    Lips, Teeth and Gums: Normal.    Oral Cavity and Oropharynx: Normal with no edema BOT.  Mild left peritonsillar erythema.  Supple to palpation.    Base of tongue, Pharyngeal walls, Vallecula and Pyriform Sinuses: Unable to complete mirror examination because of hyperactive gag.    Larynx: Unable to visualize with mirror because of hyperactive gag.    Nasopharynx examination: Could not visualize with the mirror due to gag.       NECK AND THYROID:    Neck: normal symmetry; trachea midline; normal laryngeal crepitation; no adenopathy; no neck masses; no skin lesions; no scars.    Thyroid: normal size; no masses or tenderness.       RESPIRATORY: Chest Wall expands normally and no deformities noted. Respiratory Effort normal. Auscultation: normal breath sounds.       CARDIOVASCULAR:    Auscultation: normal S1, S2, no murmurs or abnormal sounds.    Peripheral Vascular System: normal pulses with no peripheral vascular swelling or varicosities, tenderness or edema. Skin warm and dry.       LYMPH NODES: Neck Nodes: normal, no adenopathy.       NEUROLOGIC:    Level of orientation: normal to time, place, person and situation.    Cranial nerves: Cranial nerves II-XII grossly intact and symmetrical.       PSYCHIATRIC:    Level of consciousness: awake and alert.    Judgment and insight: normal.    Mood and affect: normal and appropriate to the situation.     Nasopharyngoscopy, Flexible Fiberoptic; Diagnostic     General Findings:   Nasopharynx - normal  BOT - Normal  Vallecula - Normal  Epiglottis - Normal  FVCs- Normal  TVCs- Normal  Arytenoids -Normal  Interarytenoid space - normal  Piriform Sinuses - Normal               Data:     Results for orders placed or performed during the  hospital encounter of 05/09/23   Soft tissue neck CT w contrast     Status: None    Narrative    EXAM: CT SOFT TISSUE NECK W CONTRAST  LOCATION: [facility]  DATE/TIME: 5/9/2023 9:09 PM CDT    INDICATION: throat pain and swelling. Left ear pain.  COMPARISON: None.  CONTRAST: 80 ml Isovue  370  TECHNIQUE: Routine CT Soft Tissue Neck with IV contrast. Multiplanar reformats. Dose reduction techniques were used.    FINDINGS: At the level of the oropharynx there is significant enlargement of the left palatine tonsillar tissues extending mildly across the midline to the right with deviation of the edematous uvula to the right. Portions of this area are obscured by   significant streak artifact from the patient's dental work. These changes do cause airway narrowing at the oropharyngeal level. Presumably these changes in the left tonsil and uvula are infectious in nature. Correlate clinically. Within the enlarged left   tonsillar tissue is an irregularly-shaped area of hypoenhancement along its mid and lower aspect measuring approximately 15 mm transverse by 10 mm AP by 20 mm craniocaudad compatible with a developing peritonsillar abscess. This may or may not be   drainable at this time. There is edema extending caudally along the left parapharyngeal tissues in the left hypopharynx, piriform sinus region, and supraglottic larynx with some edema involving the left side of the epiglottis. Partial effacement of the   left preepiglottic space by soft tissue edema on the left. Below this the airway is adequate. Suggestion of right vocal cord weakness with a paramedian cord on the right.    MUCOSAL SPACES/SOFT TISSUES: Normal mucosal spaces of the upper aerodigestive tract elsewhere. No retropharyngeal fluid collection. No edema in the subcutaneous tissues although the patient has little subcutaneous fat. Normal oral cavity,    spaces, and floor of mouth structures.    LYMPH NODES:  Moderate amount of presumed reactive lymphadenopathy in the anterior neck soft tissues on the left without necrotic lymphadenopathy.     SALIVARY GLANDS: Normal parotid and submandibular glands.    THYROID: Normal.     VESSELS: Vascular structures of the neck are patent.    VISUALIZED INTRACRANIAL/ORBITS/SINUSES: No abnormality of the visualized intracranial compartment or orbits. Visualized paranasal sinuses and mastoid air cells are clear.    OTHER: No destructive osseous lesion. The included lung apices are clear.      Impression    IMPRESSION:   1.  At the level of the oropharynx there is significant asymmetric enlargement of the left palatine tonsillar tissues extending mildly across the midline to the right. There is swelling/edema involving the uvula as well which is displaced to the right by   the left tonsillar tissue. These findings result in narrowing of the airway at the level of the oropharynx.  2.  Presumably these changes in the left tonsil and uvula are infectious in nature and correlate clinically.  3.  Within the enlarged left tonsillar tissue is an irregularly-shaped area of hypoenhancement measuring 2 cm x 1.5 cm x 1 cm compatible with a developing left peritonsillar abscess. This may or may not be drainable.  4.  Soft tissue edema extends caudally from the left tonsillar tissue along the left parapharyngeal tissues into the left hypopharynx, left piriform sinus region and supraglottic larynx. There is some edema involving the left side of the epiglottis.   Partial effacement of the left preepiglottic space by soft tissue edema.  5.  At the level of the larynx there is suggestion of right vocal cord weakness with a paramedian cord on the right. This is likely chronic. Correlate clinically.  6.  Moderate amount of presumed reactive lymphadenopathy in the anterior neck soft tissues mainly on the left without necrotic lymphadenopathy.   Comprehensive metabolic panel     Status: Abnormal   Result  Value Ref Range    Sodium 134 (L) 136 - 145 mmol/L    Potassium 4.2 3.4 - 5.3 mmol/L    Chloride 99 98 - 107 mmol/L    Carbon Dioxide (CO2) 25 22 - 29 mmol/L    Anion Gap 10 7 - 15 mmol/L    Urea Nitrogen 8.4 6.0 - 20.0 mg/dL    Creatinine 0.64 (L) 0.67 - 1.17 mg/dL    Calcium 8.8 8.6 - 10.0 mg/dL    Glucose 113 (H) 70 - 99 mg/dL    Alkaline Phosphatase 83 40 - 129 U/L    AST 19 10 - 50 U/L    ALT 25 10 - 50 U/L    Protein Total 6.6 6.4 - 8.3 g/dL    Albumin 3.7 3.5 - 5.2 g/dL    Bilirubin Total 0.5 <=1.2 mg/dL    GFR Estimate >90 >60 mL/min/1.73m2   Symptomatic Influenza A/B, RSV, & SARS-CoV2 PCR (COVID-19) Nasopharyngeal     Status: Normal    Specimen: Nasopharyngeal; Swab   Result Value Ref Range    Influenza A PCR Negative Negative    Influenza B PCR Negative Negative    RSV PCR Negative Negative    SARS CoV2 PCR Negative Negative    Narrative    Testing was performed using the Xpert Xpress CoV2/Flu/RSV Assay on the [employer] GeneXpert Instrument. This test should be ordered for the detection of SARS-CoV-2, influenza, and RSV viruses in individuals who meet clinical and/or epidemiological criteria. Test performance is unknown in asymptomatic patients. This test is for in vitro diagnostic use under the FDA EUA for laboratories certified under CLIA to perform high or moderate complexity testing. This test has not been FDA cleared or approved. A negative result does not rule out the presence of PCR inhibitors in the specimen or target RNA in concentration below the limit of detection for the assay. If only one viral target is positive but coinfection with multiple targets is suspected, the sample should be re-tested with another FDA cleared, approved, or authorized test, if coinfection would change clinical management. This test was validated by the [facility]. These laboratories are certified under the Clinical Laboratory Improvement Amendments of 1988 (CLIA-88) as qualified to perform high  complexity laboratory testing.   CBC with platelets and differential     Status: None   Result Value Ref Range    WBC Count 10.3 4.0 - 11.0 10e3/uL    RBC Count 4.85 4.40 - 5.90 10e6/uL    Hemoglobin 15.1 13.3 - 17.7 g/dL    Hematocrit 45.4 40.0 - 53.0 %    MCV 94 78 - 100 fL    MCH 31.1 26.5 - 33.0 pg    MCHC 33.3 31.5 - 36.5 g/dL    RDW 12.8 10.0 - 15.0 %    Platelet Count 213 150 - 450 10e3/uL    % Neutrophils 82 %    % Lymphocytes 10 %    % Monocytes 7 %    % Eosinophils 1 %    % Basophils 0 %    % Immature Granulocytes 0 %    NRBCs per 100 WBC 0 <1 /100    Absolute Neutrophils 8.3 1.6 - 8.3 10e3/uL    Absolute Lymphocytes 1.1 0.8 - 5.3 10e3/uL    Absolute Monocytes 0.7 0.0 - 1.3 10e3/uL    Absolute Eosinophils 0.1 0.0 - 0.7 10e3/uL    Absolute Basophils 0.0 0.0 - 0.2 10e3/uL    Absolute Immature Granulocytes 0.0 <=0.4 10e3/uL    Absolute NRBCs 0.0 10e3/uL   Streptococcus A Rapid Screen w/Reflex to PCR     Status: Normal    Specimen: Throat; Swab   Result Value Ref Range    Group A Strep antigen Negative Negative   Group A Streptococcus PCR Throat Swab     Status: Normal    Specimen: Throat; Swab   Result Value Ref Range    Group A strep by PCR Not Detected Not Detected    Narrative    The Xpert Xpress Strep A test, performed on the [employer], is a rapid, qualitative in vitro diagnostic test for the detection of Streptococcus pyogenes (Group A ß-hemolytic Streptococcus, Strep A) in throat swab specimens from patients with signs and symptoms of pharyngitis. The Xpert Xpress Strep A test can be used as an aid in the diagnosis of Group A Streptococcal pharyngitis. The assay is not intended to monitor treatment for Group A Streptococcus infections. The Xpert Xpress Strep A test utilizes an automated real-time polymerase chain reaction (PCR) to detect Streptococcus pyogenes DNA.   CBC with platelets differential     Status: None    Narrative    The following orders were created for panel order CBC  with platelets differential.  Procedure                               Abnormality         Status                     ---------                               -----------         ------                     CBC with platelets and d...[[id number]]                      Final result                 Please view results for these tests on the individual orders.        Assessment and Plan:   Pt transferred from [facility] with small PTA.  He is symptomatically improved with unasyn and decadron.  Recommend continued IV ABX and dexamethasone through the morning and plan discharge on Augmentin 875mg PO BID x 10 days and Prednisone x 3 days.  Recommend ENT F/U in [place] [facility] [phone] for recent vertigo and throat recheck next week.  Please call with questions or concerns.     Attestation:  I have reviewed today's vital signs, notes, medications, medication allergies, and PFSH/ROSlabs and imaging.    Bret Garcia MD

## 2023-05-10 NOTE — PHARMACY-ADMISSION MEDICATION HISTORY
Pharmacist Admission Medication History    Admission medication history is complete. The information provided in this note is only as accurate as the sources available at the time of the update.    Medication reconciliation/reorder completed by provider prior to medication history? No    Information Source(s): Patient via in-person    Pertinent Information: None    Changes made to PTA medication list:    Added: None    Deleted: Ropinirole    Changed: None    Medication Affordability:  Not including over the counter (OTC) medications, was there a time in the past 3 months when you did not take your medications as prescribed because of cost?: No    Allergies reviewed with patient and updates made in EHR: yes    Medication History Completed By: Tabitha Humphreys [facility] 5/10/2023 7:47 AM    Prior to Admission medications    Medication Sig Last Dose Taking? Auth Provider Long Term End Date   aspirin-acetaminophen-caffeine (EXCEDRIN MIGRAINE) 250-250-65 MG per tablet Take 1 tablet by mouth every 6 hours as needed for headaches  at PRN Yes Reported, Patient     NAPROXEN PO Take 500 mg by mouth 2 times daily as needed for moderate pain or headaches  at PRN  Reported, Patient

## 2023-05-10 NOTE — H&P
[facility]    History and Physical - Hospitalist Service       Date of Admission:  5/10/2023    Assessment & Plan   Alejandro Alves is a 57 year old male with past medical history of tobacco use disorder who presents initially to [facility] with left-sided throat pain, difficulty swallowing, transferred to [facility] for ENT evaluation for peritonsillar abscess.  Admitted on 5/10/2023.    Left peritonsillar abscess  *Presents with left-sided throat pain and swelling, difficulty swallowing, difficulty speaking  *CT soft tissue neck with enlargement of left palate teen tonsillar tissues with narrowing of airway at level of oropharynx, irregularly-shaped area of hypoenhancement measuring 2 x 1.5 x 1 cm compatible with developing left peritonsillar abscess, presumed reactive lymphadenopathy  *ENT covering [facility] recommended trial of medical management with IV dexamethasone and IV antibiotics.  *Symptoms significantly improved since initiation of steroids and antibiotics  - NPO until seen by ENT  - ENT consulted  - Dexamethasone 10 mg IV q8H  - Ampicillin-sulbactam IV    Tobacco use disorder  - Encouraged cessation  - Nicotine replacement declined         Diet: NPO for Medical/Clinical Reasons Except for: No Exceptions    DVT Prophylaxis: Pneumatic Compression Devices  Van Catheter: Not present  Code Status: Full Code      Disposition Plan   Admit to inpatient. Anticipate greater than or equal to 2 midnights prior to discharge.   Entered: Eliu Ritchie MD 05/10/2023, 7:03 AM     The patient's care was discussed with the ED provider, patient      Clinically Significant Risk Factors Present on Admission                                    Eliu Ritchie MD  [facility]    ______________________________________________________________________    Chief Complaint   Throat pain and swelling, difficulty speaking    History of Present Illness    Alejandro Alves is a 57 year old male who presents with the above chief complaint.    History is obtained from the patient, discussion with ED provider and review of medical record. The patient reports he had an episode of vertigo while at work Monday morning associated with headache. He napped for a few hours and when he awoke his headache was worse and he noted pain in his left ear and left side of his throat.  He attempted to eat popsicles to soothe his symptoms, however he continued to have increased pain and swelling into the following day and presented to the [facility] emergency department for further evaluation.  There is CT soft tissue neck was concerning for developing peritonsillar abscess.  Case was discussed with ENT on-call for [facility] who recommended IV dexamethasone and IV clindamycin, however due to inconsistent ENT coverage and potential need for emergent intervention, was transferred to [facility] for close monitoring and further ENT evaluation.     Past Medical History    I have reviewed this patient's medical history and updated it with pertinent information if needed.   Past Medical History:   Diagnosis Date     Tobacco use disorder        Past Surgical History   I have reviewed this patient's surgical history and updated it with pertinent information if needed.  Past Surgical History:   Procedure Laterality Date     COLONOSCOPY N/A 6/6/2016    Procedure: COMBINED COLONOSCOPY, SINGLE OR MULTIPLE BIOPSY/POLYPECTOMY BY BIOPSY;  Surgeon: Theron Vigil MD;  Location:  GI     LAPAROSCOPIC HERNIORRHAPHY INGUINAL BILATERAL Bilateral 4/15/2019    Procedure: Laparoscopic Bilateral Inguinal Herniorrhaphy with Mesh;  Surgeon: Mercedes Suazo MD;  Location:  OR     REMOVE FOREIGN BODY LOWER EXTREMITY Left 3/19/2019    Procedure: FOREIGN BODY REMOVAL LEFT KNEE;  Surgeon: Bon Watkins MD;  Location:  OR         Social History   I have reviewed this patient's  social history and updated it with pertinent information if needed.  Social History     Tobacco Use     Smoking status: Every Day     Packs/day: 1.00     Types: Cigarettes     Smokeless tobacco: Never     Tobacco comments:     patient states he would like to    Substance Use Topics     Alcohol use: No     Drug use: Not Currently     Types: Cocaine     Comment: minimal     Lives in [place]     Family History   I have reviewed this patient's family history and updated it with pertinent information if needed.   Family History   Problem Relation Age of Onset     Hypertension Father      Heart Disease Father      Heart Disease Paternal Grandfather        Prior to Admission Medications  - Pending pharmacy reconciliation   Prior to Admission Medications   Prescriptions Last Dose Informant Patient Reported? Taking?   NAPROXEN PO   Yes No   Sig: Take 500 mg by mouth   aspirin-acetaminophen-caffeine (EXCEDRIN MIGRAINE) 250-250-65 MG per tablet   Yes No   Sig: Take 1 tablet by mouth every 6 hours as needed for headaches   rOPINIRole (REQUIP) 0.25 MG tablet   No No   Sig: 3 tabs three times daily      Facility-Administered Medications: None     Allergies   No Known Allergies    Physical Exam   Vital Signs: Temp: 97.5  F (36.4  C) Temp src: Oral BP: 104/73 Pulse: 76   Resp: 17 SpO2: 96 % O2 Device: None (Room air)    Weight: 0 lbs 0 oz    Constitutional: Well-appearing, NAD  Eyes: PERRL, EOMI  HENT: Unable to visualize posterior oropharynx well, MMM, left sided facial swelling  Respiratory: Clear to auscultation bilaterally, good air movement, normal effort, no stridor  Cardiovascular: RRR, no m/r/g. No peripheral edema.    GI: Soft, non-tender, non-distended. No rebound tenderness or guarding   Skin: Warm, dry   Neurologic: Alert. Responding to questions appropriately. Following commands.    Psychiatric: Normal affect, appropriate      Data   Data reviewed today: I reviewed all medications, new labs and imaging results over  the last 24 hours. I personally reviewed no images or EKG's today.    Recent Labs   Lab 05/09/23 2124 05/09/23 2035   WBC  --  10.3   HGB  --  15.1   MCV  --  94   PLT  --  213   *  --    POTASSIUM 4.2  --    CHLORIDE 99  --    CO2 25  --    BUN 8.4  --    CR 0.64*  --    ANIONGAP 10  --    SUJEY 8.8  --    *  --    ALBUMIN 3.7  --    PROTTOTAL 6.6  --    BILITOTAL 0.5  --    ALKPHOS 83  --    ALT 25  --    AST 19  --        Recent Results (from the past 24 hour(s))   Soft tissue neck CT w contrast    Narrative    EXAM: CT SOFT TISSUE NECK W CONTRAST  LOCATION: [facility]  DATE/TIME: 5/9/2023 9:09 PM CDT    INDICATION: throat pain and swelling. Left ear pain.  COMPARISON: None.  CONTRAST: 80 ml Isovue  370  TECHNIQUE: Routine CT Soft Tissue Neck with IV contrast. Multiplanar reformats. Dose reduction techniques were used.    FINDINGS: At the level of the oropharynx there is significant enlargement of the left palatine tonsillar tissues extending mildly across the midline to the right with deviation of the edematous uvula to the right. Portions of this area are obscured by   significant streak artifact from the patient's dental work. These changes do cause airway narrowing at the oropharyngeal level. Presumably these changes in the left tonsil and uvula are infectious in nature. Correlate clinically. Within the enlarged left   tonsillar tissue is an irregularly-shaped area of hypoenhancement along its mid and lower aspect measuring approximately 15 mm transverse by 10 mm AP by 20 mm craniocaudad compatible with a developing peritonsillar abscess. This may or may not be   drainable at this time. There is edema extending caudally along the left parapharyngeal tissues in the left hypopharynx, piriform sinus region, and supraglottic larynx with some edema involving the left side of the epiglottis. Partial effacement of the   left preepiglottic space by soft tissue edema on the  left. Below this the airway is adequate. Suggestion of right vocal cord weakness with a paramedian cord on the right.    MUCOSAL SPACES/SOFT TISSUES: Normal mucosal spaces of the upper aerodigestive tract elsewhere. No retropharyngeal fluid collection. No edema in the subcutaneous tissues although the patient has little subcutaneous fat. Normal oral cavity,    spaces, and floor of mouth structures.    LYMPH NODES: Moderate amount of presumed reactive lymphadenopathy in the anterior neck soft tissues on the left without necrotic lymphadenopathy.     SALIVARY GLANDS: Normal parotid and submandibular glands.    THYROID: Normal.     VESSELS: Vascular structures of the neck are patent.    VISUALIZED INTRACRANIAL/ORBITS/SINUSES: No abnormality of the visualized intracranial compartment or orbits. Visualized paranasal sinuses and mastoid air cells are clear.    OTHER: No destructive osseous lesion. The included lung apices are clear.      Impression    IMPRESSION:   1.  At the level of the oropharynx there is significant asymmetric enlargement of the left palatine tonsillar tissues extending mildly across the midline to the right. There is swelling/edema involving the uvula as well which is displaced to the right by   the left tonsillar tissue. These findings result in narrowing of the airway at the level of the oropharynx.  2.  Presumably these changes in the left tonsil and uvula are infectious in nature and correlate clinically.  3.  Within the enlarged left tonsillar tissue is an irregularly-shaped area of hypoenhancement measuring 2 cm x 1.5 cm x 1 cm compatible with a developing left peritonsillar abscess. This may or may not be drainable.  4.  Soft tissue edema extends caudally from the left tonsillar tissue along the left parapharyngeal tissues into the left hypopharynx, left piriform sinus region and supraglottic larynx. There is some edema involving the left side of the epiglottis.   Partial effacement  of the left preepiglottic space by soft tissue edema.  5.  At the level of the larynx there is suggestion of right vocal cord weakness with a paramedian cord on the right. This is likely chronic. Correlate clinically.  6.  Moderate amount of presumed reactive lymphadenopathy in the anterior neck soft tissues mainly on the left without necrotic lymphadenopathy.

## 2023-05-10 NOTE — ED PROVIDER NOTES
History     Chief Complaint   Patient presents with     Pharyngitis     Otalgia     HPI     History provided by patient who is intermittently screaming and moaning.  Alejandro Alves is a 57 year old male who presents via EMS for evaluation of sore throat and left ear pain.  Symptoms started 3 days ago.  Hard to swallow, but is able to swallow his secretions. He reports dizziness/room spinning (vertigo). Pain is  mostly the left side and radiates into his left ear. He has not been able to eat for the last couple days due to pain in his throat.  Denies fevers, but states he has had chills.  No vomiting or diarrhea.  No significant cough or congestion.  He is a current everyday heavy smoker.  Denies drinking alcohol. Takes no regular medications.    Allergies:  No Known Allergies    Problem List:    Patient Active Problem List    Diagnosis Date Noted     Foreign body of knee, left, initial encounter 03/19/2019     Priority: Medium     Acute foreign body of left knee, initial encounter 03/18/2019     Priority: Medium     Subacromial impingement, right 02/09/2017     Priority: Medium     Superior glenoid labrum lesion of right shoulder, initial encounter 02/09/2017     Priority: Medium     Incomplete tear of right rotator cuff 02/09/2017     Priority: Medium     Restless legs syndrome (RLS) 04/20/2016     Priority: Medium     Hip pain, right 04/20/2016     Priority: Medium     Tobacco use disorder 04/11/2016     Priority: Medium        Past Medical History:    History reviewed. No pertinent past medical history.    Past Surgical History:    Past Surgical History:   Procedure Laterality Date     COLONOSCOPY N/A 6/6/2016    Procedure: COMBINED COLONOSCOPY, SINGLE OR MULTIPLE BIOPSY/POLYPECTOMY BY BIOPSY;  Surgeon: Theron Vigil MD;  Location:  GI     LAPAROSCOPIC HERNIORRHAPHY INGUINAL BILATERAL Bilateral 4/15/2019    Procedure: Laparoscopic Bilateral Inguinal Herniorrhaphy with Mesh;  Surgeon: Lakeisha,  MD Mercedes;  Location: [facility] OR     REMOVE FOREIGN BODY LOWER EXTREMITY Left 3/19/2019    Procedure: FOREIGN BODY REMOVAL LEFT KNEE;  Surgeon: Bon Watkins MD;  Location: [facility] OR       Family History:    Family History   Problem Relation Age of Onset     Hypertension Father      Heart Disease Father      Heart Disease Paternal Grandfather        Social History:  Marital Status:  Single [1]  Social History     Tobacco Use     Smoking status: Every Day     Packs/day: 1.00     Types: Cigarettes     Smokeless tobacco: Never     Tobacco comments:     patient states he would like to    Substance Use Topics     Alcohol use: No     Drug use: Not Currently     Types: Cocaine     Comment: minimal        Medications:    aspirin-acetaminophen-caffeine (EXCEDRIN MIGRAINE) 250-250-65 MG per tablet  NAPROXEN PO  rOPINIRole (REQUIP) 0.25 MG tablet          Review of Systems  As mentioned above in the history present illness. All other systems were reviewed and are negative.    Physical Exam   BP: 129/89  Pulse: 85  Temp: 99  F (37.2  C)  Resp: 18  Weight: 72.6 kg (160 lb)  SpO2: 99 %      Physical Exam  Constitutional:       General: He is in acute distress (Pain out of proportion.).      Appearance: He is not ill-appearing.   HENT:      Head: Normocephalic and atraumatic.      Right Ear: Tympanic membrane and external ear normal.      Left Ear: Tympanic membrane and external ear normal.      Nose: Congestion present.      Mouth/Throat:      Mouth: Mucous membranes are moist.      Pharynx: Pharyngeal swelling (mostly left side/tonsil), posterior oropharyngeal erythema and uvula swelling (slightly shift to the right.) present. No oropharyngeal exudate.      Tonsils: No tonsillar exudate. 3+ on the right. 3+ on the left.   Eyes:      Conjunctiva/sclera: Conjunctivae normal.   Cardiovascular:      Rate and Rhythm: Normal rate and regular rhythm.      Heart sounds: Normal heart sounds. No murmur heard.  Pulmonary:      Effort:  Pulmonary effort is normal. No respiratory distress.      Breath sounds: Normal breath sounds.   Abdominal:      General: Bowel sounds are normal. There is no distension.      Palpations: Abdomen is soft.      Tenderness: There is no abdominal tenderness.   Musculoskeletal:         General: Normal range of motion.   Skin:     General: Skin is warm and dry.      Findings: No rash.   Neurological:      General: No focal deficit present.      Mental Status: He is alert and oriented to person, place, and time.         ED Course                 Procedures              Results for orders placed or performed during the hospital encounter of 05/09/23 (from the past 24 hour(s))   CBC with platelets differential    Narrative    The following orders were created for panel order CBC with platelets differential.  Procedure                               Abnormality         Status                     ---------                               -----------         ------                     CBC with platelets and d...[[id number]]                      Final result                 Please view results for these tests on the individual orders.   CBC with platelets and differential   Result Value Ref Range    WBC Count 10.3 4.0 - 11.0 10e3/uL    RBC Count 4.85 4.40 - 5.90 10e6/uL    Hemoglobin 15.1 13.3 - 17.7 g/dL    Hematocrit 45.4 40.0 - 53.0 %    MCV 94 78 - 100 fL    MCH 31.1 26.5 - 33.0 pg    MCHC 33.3 31.5 - 36.5 g/dL    RDW 12.8 10.0 - 15.0 %    Platelet Count 213 150 - 450 10e3/uL    % Neutrophils 82 %    % Lymphocytes 10 %    % Monocytes 7 %    % Eosinophils 1 %    % Basophils 0 %    % Immature Granulocytes 0 %    NRBCs per 100 WBC 0 <1 /100    Absolute Neutrophils 8.3 1.6 - 8.3 10e3/uL    Absolute Lymphocytes 1.1 0.8 - 5.3 10e3/uL    Absolute Monocytes 0.7 0.0 - 1.3 10e3/uL    Absolute Eosinophils 0.1 0.0 - 0.7 10e3/uL    Absolute Basophils 0.0 0.0 - 0.2 10e3/uL    Absolute Immature Granulocytes 0.0 <=0.4 10e3/uL    Absolute NRBCs  0.0 10e3/uL   Streptococcus A Rapid Screen w/Reflex to PCR    Specimen: Throat; Swab   Result Value Ref Range    Group A Strep antigen Negative Negative   Symptomatic Influenza A/B, RSV, & SARS-CoV2 PCR (COVID-19) Nasopharyngeal    Specimen: Nasopharyngeal; Swab   Result Value Ref Range    Influenza A PCR Negative Negative    Influenza B PCR Negative Negative    RSV PCR Negative Negative    SARS CoV2 PCR Negative Negative    Narrative    Testing was performed using the Xpert Xpress CoV2/Flu/RSV Assay on the [employer] GeneXpert Instrument. This test should be ordered for the detection of SARS-CoV-2, influenza, and RSV viruses in individuals who meet clinical and/or epidemiological criteria. Test performance is unknown in asymptomatic patients. This test is for in vitro diagnostic use under the FDA EUA for laboratories certified under CLIA to perform high or moderate complexity testing. This test has not been FDA cleared or approved. A negative result does not rule out the presence of PCR inhibitors in the specimen or target RNA in concentration below the limit of detection for the assay. If only one viral target is positive but coinfection with multiple targets is suspected, the sample should be re-tested with another FDA cleared, approved, or authorized test, if coinfection would change clinical management. This test was validated by the [facility]. These laboratories are certified under the Clinical Laboratory Improvement Amendments of 1988 (CLIA-88) as qualified to perform high complexity laboratory testing.   Soft tissue neck CT w contrast    Narrative    EXAM: CT SOFT TISSUE NECK W CONTRAST  LOCATION: [facility]  DATE/TIME: 5/9/2023 9:09 PM CDT    INDICATION: throat pain and swelling. Left ear pain.  COMPARISON: None.  CONTRAST: 80 ml Isovue  370  TECHNIQUE: Routine CT Soft Tissue Neck with IV contrast. Multiplanar reformats. Dose reduction techniques were  used.    FINDINGS: At the level of the oropharynx there is significant enlargement of the left palatine tonsillar tissues extending mildly across the midline to the right with deviation of the edematous uvula to the right. Portions of this area are obscured by   significant streak artifact from the patient's dental work. These changes do cause airway narrowing at the oropharyngeal level. Presumably these changes in the left tonsil and uvula are infectious in nature. Correlate clinically. Within the enlarged left   tonsillar tissue is an irregularly-shaped area of hypoenhancement along its mid and lower aspect measuring approximately 15 mm transverse by 10 mm AP by 20 mm craniocaudad compatible with a developing peritonsillar abscess. This may or may not be   drainable at this time. There is edema extending caudally along the left parapharyngeal tissues in the left hypopharynx, piriform sinus region, and supraglottic larynx with some edema involving the left side of the epiglottis. Partial effacement of the   left preepiglottic space by soft tissue edema on the left. Below this the airway is adequate. Suggestion of right vocal cord weakness with a paramedian cord on the right.    MUCOSAL SPACES/SOFT TISSUES: Normal mucosal spaces of the upper aerodigestive tract elsewhere. No retropharyngeal fluid collection. No edema in the subcutaneous tissues although the patient has little subcutaneous fat. Normal oral cavity,    spaces, and floor of mouth structures.    LYMPH NODES: Moderate amount of presumed reactive lymphadenopathy in the anterior neck soft tissues on the left without necrotic lymphadenopathy.     SALIVARY GLANDS: Normal parotid and submandibular glands.    THYROID: Normal.     VESSELS: Vascular structures of the neck are patent.    VISUALIZED INTRACRANIAL/ORBITS/SINUSES: No abnormality of the visualized intracranial compartment or orbits. Visualized paranasal sinuses and mastoid air cells are  clear.    OTHER: No destructive osseous lesion. The included lung apices are clear.      Impression    IMPRESSION:   1.  At the level of the oropharynx there is significant asymmetric enlargement of the left palatine tonsillar tissues extending mildly across the midline to the right. There is swelling/edema involving the uvula as well which is displaced to the right by   the left tonsillar tissue. These findings result in narrowing of the airway at the level of the oropharynx.  2.  Presumably these changes in the left tonsil and uvula are infectious in nature and correlate clinically.  3.  Within the enlarged left tonsillar tissue is an irregularly-shaped area of hypoenhancement measuring 2 cm x 1.5 cm x 1 cm compatible with a developing left peritonsillar abscess. This may or may not be drainable.  4.  Soft tissue edema extends caudally from the left tonsillar tissue along the left parapharyngeal tissues into the left hypopharynx, left piriform sinus region and supraglottic larynx. There is some edema involving the left side of the epiglottis.   Partial effacement of the left preepiglottic space by soft tissue edema.  5.  At the level of the larynx there is suggestion of right vocal cord weakness with a paramedian cord on the right. This is likely chronic. Correlate clinically.  6.  Moderate amount of presumed reactive lymphadenopathy in the anterior neck soft tissues mainly on the left without necrotic lymphadenopathy.   Comprehensive metabolic panel   Result Value Ref Range    Sodium 134 (L) 136 - 145 mmol/L    Potassium 4.2 3.4 - 5.3 mmol/L    Chloride 99 98 - 107 mmol/L    Carbon Dioxide (CO2) 25 22 - 29 mmol/L    Anion Gap 10 7 - 15 mmol/L    Urea Nitrogen 8.4 6.0 - 20.0 mg/dL    Creatinine 0.64 (L) 0.67 - 1.17 mg/dL    Calcium 8.8 8.6 - 10.0 mg/dL    Glucose 113 (H) 70 - 99 mg/dL    Alkaline Phosphatase 83 40 - 129 U/L    AST 19 10 - 50 U/L    ALT 25 10 - 50 U/L    Protein Total 6.6 6.4 - 8.3 g/dL    Albumin  3.7 3.5 - 5.2 g/dL    Bilirubin Total 0.5 <=1.2 mg/dL    GFR Estimate >90 >60 mL/min/1.73m2       Medications   HYDROmorphone (PF) (DILAUDID) injection 0.5 mg (0.5 mg Intravenous $Given 5/9/23 2038)   clindamycin (CLEOCIN) 900 mg in 50 mL D5W intermittent infusion (900 mg Intravenous $New Bag 5/9/23 2226)   dexamethasone PF (DECADRON) injection 10 mg (has no administration in time range)   ondansetron (ZOFRAN) injection 4 mg (4 mg Intravenous $Given 5/9/23 2038)   ketorolac (TORADOL) injection 15 mg (15 mg Intravenous $Given 5/9/23 2038)   dexamethasone PF (DECADRON) injection 10 mg (10 mg Intravenous $Given 5/9/23 2038)   100 mL saline bag CT (70 mLs Intravenous $Given 5/9/23 2050)   iopamidol (ISOVUE-370) solution 500 mL (80 mLs Intravenous $Given 5/9/23 2050)     9:30 pm I reviewed the lab findings with patient and his family.  We are waiting for CT results.  Patient reports significant pain relief after the pain medication and steroids.      10:07 pm I consulted BETO Dos Santos. [phone].  --Recommends admission, needs higher level of care since hospitalist here may not be comfortable managing this due to the rare risk of developing compromised airway.  --This does not look like this would be amenable to drainage at this time.  --IV antibiotics with Clindamycin 900 mg every 6 hours.  --IV Steroids with Decadron 10 mg every 8 hours.  --Keep Dr. Valenzuela in the loop and contact him in the morning if patient still here in the ED waiting for a bed.    10:30 pm I reviewed the lab and imaging findings as well as the recommendation by ENT with patient and his family at the bedside.  I explained that we are not able to keep him at this hospital and that he needs a higher level of care given the nature of his condition.  Patient's family would like him to go to [facility].  I explained that I can check with the [facility] system to see if they have beds.    10:45 pm unfortunately, there are no beds in the  [facility] system.  We have requested a bed from the [place] system and will continue to look at other systems for a higher level of care inpatient bed.        Assessments & Plan (with Medical Decision Making)   Patient needing transfer to higher level of care for peritonsillar abscess with extensive soft tissue swelling.  Patient has received IV Clindamycin 300 mg and IV Decadron 10 mg. His pain improved with IV Dilaudid.  He is swallowing his own secretions, able to talk/phonate well but is a little muffled. No respiratory distress.     I have signed patient out at the end of my shift to Dr. Meyer.    New Prescriptions    No medications on file       Final diagnoses:   Peritonsillar abscess       5/9/2023   [facility] EMERGENCY DEPT     Dillon, JOSELINE Farfan CNP  05/09/23 5274

## 2023-05-10 NOTE — PROGRESS NOTES
[facility]  Hospitalist Progress Note   05/10/2023          Assessment and Plan:       Alejandro Alves is a 57 year old male with tobacco use disorder sent in from [facility] to [facility] with left-sided throat pain, difficulty swallowing and concern for peritonsillar abscess.    Left peritonsillar abscess  *Presented with left-sided throat pain and swelling, difficulty swallowing, difficulty speaking.  WBC 10.3.  CRP 79.94.  *CT soft tissue neck with enlargement of left palate teen tonsillar tissues with narrowing of airway at level of oropharynx, irregularly-shaped area of hypoenhancement measuring 2 x 1.5 x 1 cm compatible with developing left peritonsillar abscess, presumed reactive lymphadenopathy  *ENT covering [facility] recommended trial of medical management with IV dexamethasone and IV antibiotics.  *Symptoms significantly improved since initiation of steroids and antibiotics  - NPO until seen by ENT.   - ENT consulted requested  - Dexamethasone 10 mg IV q8H  - Ampicillin-sulbactam IV  Continue IV fluids until able to tolerate orals  As needed Tylenol ordered.  As needed oral/IV pain meds.  Encourage need to minimize narcotic use.  As needed warm compress.     Tobacco use disorder  - Encouraged cessation  - Nicotine replacement declined        Orders Placed This Encounter      Regular Diet Adult      DVT Prophylaxis: SCDs, ambulate.  Code Status: Full Code  Disposition: Expected discharge in 1 to 2 days pending clinical improvement.    Discussed with patient, bedside RN  >25 minutes spent by me on the date of service doing chart review, history, exam, documentation & further activities per the note.    Rekha Roche MD        Interval History:      Patient lying in bed.  Complaining of neck pain.  No speech difficulty.  Complaining of discomfort in throat.  N.p.o., receiving IV fluids.  Tmax 99.  Receiving IV/p.o. as needed pain meds.  Denies any chest pain or  shortness of breath.  No chest pain or palpitations.           Physical Exam:        Physical Exam   Temp:  [97.5  F (36.4  C)-99  F (37.2  C)] 97.5  F (36.4  C)  Pulse:  [63-85] 76  Resp:  [17-18] 17  BP: (104-129)/(71-89) 104/73  SpO2:  [92 %-99 %] 96 %    Intake/Output Summary (Last 24 hours) at 5/10/2023 1458  Last data filed at 5/10/2023 1350  Gross per 24 hour   Intake --   Output 400 ml   Net -400 ml       PHYSICAL EXAM  GENERAL: Patient is in no distress. Alert and oriented.  HEENT: Left-sided facial swelling present.  No palpable lymph nodes.  Able to open mouth,  unable to visualize posterior oropharynx.  HEART: Regular rate and rhythm. S1S2. No murmurs  LUNGS: Clear to auscultation bilaterally. No expiratory wheeze.  Respirations unlabored  EXTREMITIES: No pedal edema.   SKIN: Warm, dry. No rash or bruising.  PSYCHIATRY Cooperative       Medications:          ampicillin-sulbactam  3 g Intravenous Q6H     dexamethasone  10 mg Intravenous Q8H     sodium chloride (PF)  3 mL Intracatheter Q8H     acetaminophen **OR** acetaminophen, bisacodyl, HYDROmorphone, HYDROmorphone, lidocaine 4%, lidocaine (buffered or not buffered), melatonin, naloxone **OR** naloxone **OR** naloxone **OR** naloxone, ondansetron **OR** ondansetron, oxyCODONE, oxyCODONE IR, polyethylene glycol, prochlorperazine **OR** prochlorperazine **OR** prochlorperazine, sodium chloride (PF)         Data:      All new lab and imaging data was reviewed.

## 2023-05-10 NOTE — ED NOTES
EMS here, report to crew, pt en route. Receiving facility updated along with Emely, patient's sister.

## 2023-05-10 NOTE — PROGRESS NOTES
"Sister, Emely informed writer of concerns for Reilly. Sisters states h/o drug abuse, DUI. It is noted by writer and sister that patient seems \"off\". Sleepy with int confusion. Seems delayed cognitively; slight disoriented, slightly pressured speech. Makes comments not related to context of situation. Sister states patient will \"dissapear for days at a time\" He often doesn't have food, money. 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  Sister has mental health concerns and does not feel pt is safe to be IND at this time. Requested OT evaluation. MD will assess and eval pt tomorrow AM and discuss plan  "

## 2023-05-10 NOTE — ED NOTES
Bed: ED17  Expected date:   Expected time:   Means of arrival:   Comments:  [facility]  56 y/o M  Sore throat, dehydration

## 2023-05-11 LAB
ANION GAP SERPL CALCULATED.3IONS-SCNC: 11 MMOL/L (ref 7–15)
BUN SERPL-MCNC: 24.6 MG/DL (ref 6–20)
CALCIUM SERPL-MCNC: 9.1 MG/DL (ref 8.6–10)
CHLORIDE SERPL-SCNC: 103 MMOL/L (ref 98–107)
CREAT SERPL-MCNC: 0.64 MG/DL (ref 0.67–1.17)
DEPRECATED HCO3 PLAS-SCNC: 25 MMOL/L (ref 22–29)
ERYTHROCYTE [DISTWIDTH] IN BLOOD BY AUTOMATED COUNT: 12.4 % (ref 10–15)
GFR SERPL CREATININE-BSD FRML MDRD: >90 ML/MIN/1.73M2
GLUCOSE SERPL-MCNC: 140 MG/DL (ref 70–99)
HBA1C MFR BLD: 5.9 %
HCT VFR BLD AUTO: 40.3 % (ref 40–53)
HGB BLD-MCNC: 13.5 G/DL (ref 13.3–17.7)
MCH RBC QN AUTO: 31.1 PG (ref 26.5–33)
MCHC RBC AUTO-ENTMCNC: 33.5 G/DL (ref 31.5–36.5)
MCV RBC AUTO: 93 FL (ref 78–100)
PLATELET # BLD AUTO: 260 10E3/UL (ref 150–450)
POTASSIUM SERPL-SCNC: 4.6 MMOL/L (ref 3.4–5.3)
RBC # BLD AUTO: 4.34 10E6/UL (ref 4.4–5.9)
SODIUM SERPL-SCNC: 139 MMOL/L (ref 136–145)
WBC # BLD AUTO: 14.4 10E3/UL (ref 4–11)

## 2023-05-11 PROCEDURE — 83036 HEMOGLOBIN GLYCOSYLATED A1C: CPT | Performed by: HOSPITALIST

## 2023-05-11 PROCEDURE — 250N000011 HC RX IP 250 OP 636: Performed by: INTERNAL MEDICINE

## 2023-05-11 PROCEDURE — 99232 SBSQ HOSP IP/OBS MODERATE 35: CPT | Performed by: HOSPITALIST

## 2023-05-11 PROCEDURE — 250N000013 HC RX MED GY IP 250 OP 250 PS 637: Performed by: INTERNAL MEDICINE

## 2023-05-11 PROCEDURE — 85027 COMPLETE CBC AUTOMATED: CPT | Performed by: INTERNAL MEDICINE

## 2023-05-11 PROCEDURE — 120N000001 HC R&B MED SURG/OB

## 2023-05-11 PROCEDURE — 250N000011 HC RX IP 250 OP 636: Performed by: HOSPITALIST

## 2023-05-11 PROCEDURE — 36415 COLL VENOUS BLD VENIPUNCTURE: CPT | Performed by: INTERNAL MEDICINE

## 2023-05-11 PROCEDURE — 93005 ELECTROCARDIOGRAM TRACING: CPT

## 2023-05-11 PROCEDURE — 93010 ELECTROCARDIOGRAM REPORT: CPT | Performed by: INTERNAL MEDICINE

## 2023-05-11 PROCEDURE — 80048 BASIC METABOLIC PNL TOTAL CA: CPT | Performed by: INTERNAL MEDICINE

## 2023-05-11 RX ORDER — DEXAMETHASONE SODIUM PHOSPHATE 4 MG/ML
4 INJECTION, SOLUTION INTRA-ARTICULAR; INTRALESIONAL; INTRAMUSCULAR; INTRAVENOUS; SOFT TISSUE EVERY 8 HOURS
Status: DISCONTINUED | OUTPATIENT
Start: 2023-05-11 | End: 2023-05-12 | Stop reason: HOSPADM

## 2023-05-11 RX ORDER — CALCIUM CARBONATE 500 MG/1
500 TABLET, CHEWABLE ORAL DAILY PRN
Status: DISCONTINUED | OUTPATIENT
Start: 2023-05-11 | End: 2023-05-12 | Stop reason: HOSPADM

## 2023-05-11 RX ADMIN — AMPICILLIN SODIUM AND SULBACTAM SODIUM 3 G: 2; 1 INJECTION, POWDER, FOR SOLUTION INTRAMUSCULAR; INTRAVENOUS at 06:12

## 2023-05-11 RX ADMIN — AMPICILLIN SODIUM AND SULBACTAM SODIUM 3 G: 2; 1 INJECTION, POWDER, FOR SOLUTION INTRAMUSCULAR; INTRAVENOUS at 13:13

## 2023-05-11 RX ADMIN — DEXAMETHASONE SODIUM PHOSPHATE 4 MG: 4 INJECTION, SOLUTION INTRAMUSCULAR; INTRAVENOUS at 20:13

## 2023-05-11 RX ADMIN — OXYCODONE HYDROCHLORIDE 2.5 MG: 5 TABLET ORAL at 23:08

## 2023-05-11 RX ADMIN — DEXAMETHASONE SODIUM PHOSPHATE 10 MG: 10 INJECTION, SOLUTION INTRAMUSCULAR; INTRAVENOUS at 03:06

## 2023-05-11 RX ADMIN — ACETAMINOPHEN 650 MG: 325 TABLET ORAL at 20:12

## 2023-05-11 RX ADMIN — DEXAMETHASONE SODIUM PHOSPHATE 4 MG: 4 INJECTION, SOLUTION INTRAMUSCULAR; INTRAVENOUS at 13:13

## 2023-05-11 RX ADMIN — ONDANSETRON 4 MG: 4 TABLET, ORALLY DISINTEGRATING ORAL at 23:07

## 2023-05-11 RX ADMIN — AMPICILLIN SODIUM AND SULBACTAM SODIUM 3 G: 2; 1 INJECTION, POWDER, FOR SOLUTION INTRAMUSCULAR; INTRAVENOUS at 20:12

## 2023-05-11 RX ADMIN — ACETAMINOPHEN 650 MG: 325 TABLET ORAL at 13:29

## 2023-05-11 RX ADMIN — CALCIUM CARBONATE (ANTACID) CHEW TAB 500 MG 500 MG: 500 CHEW TAB at 06:13

## 2023-05-11 ASSESSMENT — ACTIVITIES OF DAILY LIVING (ADL)
ADLS_ACUITY_SCORE: 35

## 2023-05-11 NOTE — PLAN OF CARE
Goal Outcome Evaluation: VSS on RA. Oriented however rambles when asked assessment questions. Localized swelling noted on left side of face. Reports pain low, no intervention needed. Sleeping most of the night. Reported heart burn, writer paged hospitalist for order of Tums, confirmed, however patient in deep sleep when writer attempted to administer. Intermittent iv ABX. Voiding adequately. Continue with plan of care.       Plan of Care Reviewed With: patient    Overall Patient Progress: improvingOverall Patient Progress: improving

## 2023-05-11 NOTE — PROGRESS NOTES
[facility]  Hospitalist Progress Note   05/11/2023          Assessment and Plan:       Alejandro Alves is a 57 year old male with tobacco use disorder sent in from [facility] to [facility] with left-sided throat pain, difficulty swallowing and concern for peritonsillar abscess on 5/10/2023.     Left peritonsillar abscess  *Presented with left-sided throat pain and swelling, difficulty swallowing, difficulty speaking.  WBC 10.3.  CRP 79.94.  *CT soft tissue neck with enlargement of left palate teen tonsillar tissues with narrowing of airway at level of oropharynx, irregularly-shaped area of hypoenhancement measuring 2 x 1.5 x 1 cm compatible with developing left peritonsillar abscess, presumed reactive lymphadenopathy  *ENT followed, recommend medical management.  Appreciate comanagement.   -Was on IV Decadron 10 mg every 8 hours, decreased to 4 mg IV Decadron every 8 hours.  Switch to oral prednisone tomorrow on discharge.  -Continue IV Unasyn, switch to oral Augmentin on discharge  Discontinue IV fluids.  Tylenol as needed.  Aggressive IV/oral narcotics only for moderate to severe pain not responding to Tylenol.  Plan discharge on Augmentin 875mg PO BID x 10 days and Prednisone x 3 days.    Recommend ENT F/U in [place] with [facility] [phone] for  throat recheck next week.    Vertigo.  Patient reported dizziness, barely ambulated out of bed since admission.  Per report mostly sleepy throughout the day.  -- Per nursing report sister had expressed some concerns regarding cognition, mental health, substance issues and compliance to care with ongoing to nursing staff on 5/10.   Discussed with patient on 5/11, drowsy but arousable and able to answer most questions.  Denies any thoughts of hurting himself or others.  Denies any low mood. Patient reports makes his own decision, declined for provider to call her sister.    EKG ordered.  Orthostatic vitals.  Monitor  vitals, decrease steroids as above encourage oral fluid intake.  Encourage ambulation.  If concerns will consider PT evaluation.    Hyperglycemia from prediabetes.  Hemoglobin A1c of 5.9.  Monitor blood sugars periodically.  Age-appropriate health maintenance on PCP visit    Health maintenance  Patient's family reportedly expressed concerns regarding the lack of health maintenance as outpatient.    Encouraged the patient to consider health maintenance as outpatient.  Care coordinator assistance requested for establishing primary care appointment.    Tobacco use disorder  - Encouraged cessation  - Nicotine replacement declined     Orders Placed This Encounter      Regular Diet Adult      DVT Prophylaxis: SCD, ambulate  Code Status: Full Code  Disposition: Expected discharge in 5/12.    Discussed with patient, bedside RN  >35 minutes spent by me on the date of service doing chart review, history, exam, documentation & further activities per the note.            Rekha Roche MD        Interval History:        Patient drowsy but arousable.    Reported dizziness, barely ambulated out of bed since admission.  Per report mostly sleepy throughout the day.  -- Per nursing report sister had expressed some concerns regarding cognition, mental health, substance issues and compliance to care with ongoing to nursing staff on 5/10.   No chest pain or palpitations.  Afebrile.  Reports baseline numbness bilateral lower extremity.           Physical Exam:        Physical Exam   Temp:  [97.7  F (36.5  C)-98.4  F (36.9  C)] 97.7  F (36.5  C)  Pulse:  [60-87] 60  Resp:  [17-18] 18  BP: (112-117)/(70-84) 115/84  SpO2:  [94 %-96 %] 94 %    Intake/Output Summary (Last 24 hours) at 5/11/2023 1508  Last data filed at 5/11/2023 1400  Gross per 24 hour   Intake 1040 ml   Output --   Net 1040 ml       Admission    Current      PHYSICAL EXAM  GENERAL: Drowsy but arousable.  Able to answer most questions  HEENT: Oropharynx pink.  No palpable  lymphadenopathy.   LUNGS: Clear to auscultation bilaterally. No expiratory wheeze.  Respirations unlabored  NEURO: Cranial nerves grossly intact, able to move all extremities  EXTREMITIES: No pedal edema.   SKIN: Warm, dry. No rash or bruising.  PSYCHIATRY cooperative, drowsy       Medications:          ampicillin-sulbactam  3 g Intravenous Q6H     dexamethasone  4 mg Intravenous Q8H     sodium chloride (PF)  3 mL Intracatheter Q8H     acetaminophen **OR** acetaminophen, bisacodyl, calcium carbonate, HYDROmorphone, HYDROmorphone, lidocaine 4%, lidocaine (buffered or not buffered), melatonin, naloxone **OR** naloxone **OR** naloxone **OR** naloxone, ondansetron **OR** ondansetron, oxyCODONE, oxyCODONE IR, polyethylene glycol, prochlorperazine **OR** prochlorperazine **OR** prochlorperazine, sodium chloride (PF)         Data:      All new lab and imaging data was reviewed.

## 2023-05-12 VITALS
SYSTOLIC BLOOD PRESSURE: 114 MMHG | TEMPERATURE: 97.5 F | RESPIRATION RATE: 18 BRPM | OXYGEN SATURATION: 94 % | HEART RATE: 54 BPM | DIASTOLIC BLOOD PRESSURE: 70 MMHG

## 2023-05-12 PROCEDURE — 250N000011 HC RX IP 250 OP 636: Performed by: HOSPITALIST

## 2023-05-12 PROCEDURE — 250N000013 HC RX MED GY IP 250 OP 250 PS 637: Performed by: INTERNAL MEDICINE

## 2023-05-12 PROCEDURE — 250N000011 HC RX IP 250 OP 636: Performed by: INTERNAL MEDICINE

## 2023-05-12 PROCEDURE — 99239 HOSP IP/OBS DSCHRG MGMT >30: CPT | Performed by: HOSPITALIST

## 2023-05-12 RX ORDER — PREDNISONE 20 MG/1
40 TABLET ORAL DAILY
Qty: 6 TABLET | Refills: 0 | Status: SHIPPED | OUTPATIENT
Start: 2023-05-12 | End: 2023-05-15

## 2023-05-12 RX ADMIN — DEXAMETHASONE SODIUM PHOSPHATE 4 MG: 4 INJECTION, SOLUTION INTRAMUSCULAR; INTRAVENOUS at 13:33

## 2023-05-12 RX ADMIN — AMPICILLIN SODIUM AND SULBACTAM SODIUM 3 G: 2; 1 INJECTION, POWDER, FOR SOLUTION INTRAMUSCULAR; INTRAVENOUS at 01:53

## 2023-05-12 RX ADMIN — AMPICILLIN SODIUM AND SULBACTAM SODIUM 3 G: 2; 1 INJECTION, POWDER, FOR SOLUTION INTRAMUSCULAR; INTRAVENOUS at 13:33

## 2023-05-12 RX ADMIN — AMPICILLIN SODIUM AND SULBACTAM SODIUM 3 G: 2; 1 INJECTION, POWDER, FOR SOLUTION INTRAMUSCULAR; INTRAVENOUS at 08:00

## 2023-05-12 RX ADMIN — ACETAMINOPHEN 650 MG: 325 TABLET ORAL at 08:13

## 2023-05-12 RX ADMIN — DEXAMETHASONE SODIUM PHOSPHATE 4 MG: 4 INJECTION, SOLUTION INTRAMUSCULAR; INTRAVENOUS at 04:32

## 2023-05-12 ASSESSMENT — ACTIVITIES OF DAILY LIVING (ADL)
ADLS_ACUITY_SCORE: 35

## 2023-05-12 NOTE — PLAN OF CARE
Goal Outcome Evaluation:    A&OX4, minimal neck pain, however c/o multiple headaches today medicated with tylenol with relief.  Up with SBA.  Left neck/face minimally swollen.  Tolerating diet.  Voiding well. 1 episode of nausea/vomitting with headache.  After patient threw up he stated he feels better.

## 2023-05-12 NOTE — PLAN OF CARE
Patient admitted with left peritonsillar abscess. Pt A/O, forgetful at times. VSS, RA. Reports mild headache, improved since previous shift. Denies nausea. Swallows without difficulty. Respirations regular, non-labored. Mild swelling to left side of face and throat. SBA to bathroom. On IV abx and IV steroids. Plan for discharge home today on po abx and po prednisone.

## 2023-05-12 NOTE — PROGRESS NOTES
Pt here with left peritonsillar abcess. A&O X 4. Neuros intact. VSS on RA. Regular diet, thin liquids. Takes pills whole. Up independently. Denies pain. Pt scoring green on the Aggression Stop Light Tool. Pt discharged today home. PIV removed. Writer educated pt on all discharge instructions and pt stated understanding. Primary care apt made for follow up on hospital stay. Pt stated understanding. All medications sent with pt. Pt discharged with sister home.

## 2023-05-12 NOTE — DISCHARGE SUMMARY
Discharge Summary  Hospitalist    Date of Admission:  5/10/2023  Date of Discharge:  5/12/2023  Discharging Provider: Rekha Roche MD    Primary Care Physician   Physician No Ref-Primary  Primary Care Provider Phone Number: None  Primary Care Provider Fax Number: [phone]    PRINCIPAL DIAGNOSIS  Left peritonsillar abscess   Vertigo improved   Hyperglycemia from prediabetes and steroids  Prediabetes with hemoglobin A1c of 5.9.    Past Medical History:   Diagnosis Date     Tobacco use disorder        History of Present Illness   Alejandro Alves is an 57 year old male who presented with throat pain.     Hospital Course     Alejandro Alves is a 57 year old male with tobacco use disorder sent in from [facility] to [facility] with left-sided throat pain, difficulty swallowing and concern for peritonsillar abscess on 5/10/2023.     Left peritonsillar abscess   *Presented with left-sided throat pain and swelling, difficulty swallowing, difficulty speaking.  Associated discomfort in left ear.  On admission pharyngeal swelling, posterior oropharyngeal erythema.   WBC 10.3.  CRP 79.94.  Influenza A, influenza B, RSV, COVID-19 PCR negative.  Group A streptococcal antigen negative.  *CT soft tissue neck with enlargement of left palate teen tonsillar tissues with narrowing of airway at level of oropharynx, irregularly-shaped area of hypoenhancement measuring 2 x 1.5 x 1 cm compatible with developing left peritonsillar abscess, presumed reactive lymphadenopathy    *ENT followed, recommend medical management with steroids and antibiotics and close follow-up in clinic.  -Was on IV Decadron during hospital stay from 5/10 to 5/12.  Switch to oral prednisone 40 mg daily for 3 days on discharge.  Was on IV Unasyn during hospital stay, switch to oral Augmentin twice daily for 10 days on discharge.    Tylenol as needed.  Oral hygiene.  Recommend ENT F/U in [place] with [facility] [phone] for  throat recheck next week or earlier if symptomatic.  Encouraged to consider abstinence from nicotine      Vertigo improved   Patient reported dizziness and room spinning on 5/10, 5/11.   Orthostatic vitals negative.  Electrolytes within normal limits.  Blood glucose 140  EKG sinus rhythm, QTc 461.   -With steroids, antibiotics symptoms improving.  Weaned off narcotics.    Encourage adequate oral hydration.  ENT follow-up as above.     Hyperglycemia from prediabetes and steroids.   Prediabetes with Hemoglobin A1c of 5.9.  Monitor blood sugars periodically.  Age-appropriate health maintenance on PCP visit    Leukocytosis likely reactive from steroid therapy.  WBC count increased to 14.4 from 10.3 while on steroids  Trend WBC count,CRP in 10 days     Health maintenance  Patient's family reportedly expressed concerns regarding patient's reluctance to access healthcare as outpatient.   Will defer health maintenance to outpatient, did discuss with patient to establish primary care.  Requested care coordinator assistance with establishing PCP appointment.     Tobacco use disorder  Reports smokes a pack a day.  Declined nicotine replacement therapy during hospital stay.  Encouraged to consider abstinence.  Needs reinforcement as outpatient    Rekha Roche MD.    Pending Results   Unresulted Labs Ordered in the Past 30 Days of this Admission     No orders found from 4/10/2023 to 5/11/2023.             Physical Exam   There were no vitals filed for this visit.  Vital Signs with Ranges  Temp:  [97.5  F (36.4  C)-98.4  F (36.9  C)] 97.5  F (36.4  C)  Pulse:  [54-73] 54  Resp:  [16-18] 18  BP: (114-130)/(70-77) 114/70  SpO2:  [94 %-97 %] 94 %  I/O last 3 completed shifts:  In: 1200 [P.O.:1200]  Out: -   PHYSICAL EXAM  GENERAL:  Awake and oriented x3.  HEENT: Oropharynx pink.  No palpable lymphadenopathy.   LUNGS: Clear to auscultation bilaterally. No expiratory wheeze.  Respirations unlabored  NEURO: Moving all  extremities.  EXTREMITIES: No pedal edema.   SKIN: Warm, dry. No rash or bruising.  PSYCHIATRY  cooperative  )Consultations This Hospital Stay   ENT IP CONSULT    Time Spent on this Encounter   I, Rekha Roche MD, personally saw the patient today and spent greater than 30 minutes discharging this patient. Discussed with patient, bedside RN.    Discharge Disposition   Discharged to home  Condition at discharge: Stable    Discharge Orders      Reason for your hospital stay    You were admitted to the hospital with throat pain, difficulty swallowing.  Concern for left peritonsillar abscess.  Followed by ENT.  Treated with IV steroids and antibiotics.  Plan for discharge with oral antibiotics and steroids.     Activity    Your activity upon discharge: activity as tolerated     Discharge Instructions    Tylenol as needed for pain over-the-counter.  Oral hygiene.   consider abstinence from nicotine.     Follow-up and recommended labs and tests     Follow up with primary care provider, within 10 days for hospital follow- up.  Follow WBC count, CRP in 10 days  Age-appropriate health maintenance on PCP visit.  Monitor blood sugars periodically, noted to have prediabetes     Recommend ENT Follow up in [place] with [facility] [phone] for  throat recheck within 1 week or earlier if symptomatic.  .     Diet    Consider carbohydrate controlled diet.       Discharge Medications   Current Discharge Medication List      START taking these medications    Details   amoxicillin-clavulanate (AUGMENTIN) 875-125 MG tablet Take 1 tablet by mouth 2 times daily  Qty: 20 tablet, Refills: 0    Associated Diagnoses: Peritonsillar abscess      predniSONE (DELTASONE) 20 MG tablet Take 2 tablets (40 mg) by mouth daily for 3 days  Qty: 6 tablet, Refills: 0    Associated Diagnoses: Peritonsillar abscess         STOP taking these medications       aspirin-acetaminophen-caffeine (EXCEDRIN MIGRAINE) 250-250-65 MG per tablet  Comments:   Reason for Stopping:         NAPROXEN PO Comments:   Reason for Stopping:             Allergies   No Known Allergies    DATA  Most Recent 3 CBC's:Recent Labs   Lab Test 05/11/23  0702 05/09/23 2035 03/18/19  2346   WBC 14.4* 10.3 8.9   HGB 13.5 15.1 13.4   MCV 93 94 92    213 258      Most Recent 3 BMP's:  Recent Labs   Lab Test 05/11/23  0702 05/09/23 2124 03/19/19  0105    134* 141   POTASSIUM 4.6 4.2 4.1   CHLORIDE 103 99 107   CO2 25 25 24   BUN 24.6* 8.4 26   CR 0.64* 0.64* 0.72   ANIONGAP 11 10 10   SUJEY 9.1 8.8 8.2*   * 113* 139*     Most Recent 2 LFT's:  Recent Labs   Lab Test 05/09/23 2124 04/20/16  1010   AST 19 22   ALT 25 56   ALKPHOS 83 67   BILITOTAL 0.5 0.4   Most Recent TSH, T4 and A1c Labs:  Recent Labs   Lab Test 05/11/23  0702   A1C 5.9*     Results for orders placed or performed during the hospital encounter of 05/09/23   Soft tissue neck CT w contrast    Narrative    EXAM: CT SOFT TISSUE NECK W CONTRAST  LOCATION: [facility]  DATE/TIME: 5/9/2023 9:09 PM CDT    INDICATION: throat pain and swelling. Left ear pain.  COMPARISON: None.  CONTRAST: 80 ml Isovue  370  TECHNIQUE: Routine CT Soft Tissue Neck with IV contrast. Multiplanar reformats. Dose reduction techniques were used.    FINDINGS: At the level of the oropharynx there is significant enlargement of the left palatine tonsillar tissues extending mildly across the midline to the right with deviation of the edematous uvula to the right. Portions of this area are obscured by   significant streak artifact from the patient's dental work. These changes do cause airway narrowing at the oropharyngeal level. Presumably these changes in the left tonsil and uvula are infectious in nature. Correlate clinically. Within the enlarged left   tonsillar tissue is an irregularly-shaped area of hypoenhancement along its mid and lower aspect measuring approximately 15 mm transverse by 10 mm AP by  20 mm craniocaudad compatible with a developing peritonsillar abscess. This may or may not be   drainable at this time. There is edema extending caudally along the left parapharyngeal tissues in the left hypopharynx, piriform sinus region, and supraglottic larynx with some edema involving the left side of the epiglottis. Partial effacement of the   left preepiglottic space by soft tissue edema on the left. Below this the airway is adequate. Suggestion of right vocal cord weakness with a paramedian cord on the right.    MUCOSAL SPACES/SOFT TISSUES: Normal mucosal spaces of the upper aerodigestive tract elsewhere. No retropharyngeal fluid collection. No edema in the subcutaneous tissues although the patient has little subcutaneous fat. Normal oral cavity,    spaces, and floor of mouth structures.    LYMPH NODES: Moderate amount of presumed reactive lymphadenopathy in the anterior neck soft tissues on the left without necrotic lymphadenopathy.     SALIVARY GLANDS: Normal parotid and submandibular glands.    THYROID: Normal.     VESSELS: Vascular structures of the neck are patent.    VISUALIZED INTRACRANIAL/ORBITS/SINUSES: No abnormality of the visualized intracranial compartment or orbits. Visualized paranasal sinuses and mastoid air cells are clear.    OTHER: No destructive osseous lesion. The included lung apices are clear.      Impression    IMPRESSION:   1.  At the level of the oropharynx there is significant asymmetric enlargement of the left palatine tonsillar tissues extending mildly across the midline to the right. There is swelling/edema involving the uvula as well which is displaced to the right by   the left tonsillar tissue. These findings result in narrowing of the airway at the level of the oropharynx.  2.  Presumably these changes in the left tonsil and uvula are infectious in nature and correlate clinically.  3.  Within the enlarged left tonsillar tissue is an irregularly-shaped area of  hypoenhancement measuring 2 cm x 1.5 cm x 1 cm compatible with a developing left peritonsillar abscess. This may or may not be drainable.  4.  Soft tissue edema extends caudally from the left tonsillar tissue along the left parapharyngeal tissues into the left hypopharynx, left piriform sinus region and supraglottic larynx. There is some edema involving the left side of the epiglottis.   Partial effacement of the left preepiglottic space by soft tissue edema.  5.  At the level of the larynx there is suggestion of right vocal cord weakness with a paramedian cord on the right. This is likely chronic. Correlate clinically.  6.  Moderate amount of presumed reactive lymphadenopathy in the anterior neck soft tissues mainly on the left without necrotic lymphadenopathy.

## 2023-05-15 LAB
ATRIAL RATE - MUSE: 77 BPM
DIASTOLIC BLOOD PRESSURE - MUSE: NORMAL MMHG
INTERPRETATION ECG - MUSE: NORMAL
P AXIS - MUSE: 68 DEGREES
PR INTERVAL - MUSE: 128 MS
QRS DURATION - MUSE: 100 MS
QT - MUSE: 408 MS
QTC - MUSE: 461 MS
R AXIS - MUSE: 79 DEGREES
SYSTOLIC BLOOD PRESSURE - MUSE: NORMAL MMHG
T AXIS - MUSE: 66 DEGREES
VENTRICULAR RATE- MUSE: 77 BPM

## 2023-05-26 ENCOUNTER — OFFICE VISIT (OUTPATIENT)
Dept: FAMILY MEDICINE | Facility: CLINIC | Age: 58
End: 2023-05-26
Payer: COMMERCIAL

## 2023-05-26 VITALS
RESPIRATION RATE: 28 BRPM | SYSTOLIC BLOOD PRESSURE: 118 MMHG | BODY MASS INDEX: 23.5 KG/M2 | TEMPERATURE: 97.9 F | DIASTOLIC BLOOD PRESSURE: 74 MMHG | HEART RATE: 104 BPM | WEIGHT: 149 LBS | OXYGEN SATURATION: 100 %

## 2023-05-26 DIAGNOSIS — J36 PERITONSILLAR ABSCESS: ICD-10-CM

## 2023-05-26 DIAGNOSIS — Z09 HOSPITAL DISCHARGE FOLLOW-UP: Primary | ICD-10-CM

## 2023-05-26 DIAGNOSIS — Z13.220 LIPID SCREENING: ICD-10-CM

## 2023-05-26 DIAGNOSIS — Z13.1 SCREENING FOR DIABETES MELLITUS: ICD-10-CM

## 2023-05-26 DIAGNOSIS — Z76.89 ENCOUNTER TO ESTABLISH CARE: ICD-10-CM

## 2023-05-26 PROBLEM — K46.9 HERNIA, ABDOMINAL: Status: ACTIVE | Noted: 2019-04-15

## 2023-05-26 LAB
ANION GAP SERPL CALCULATED.3IONS-SCNC: 12 MMOL/L (ref 7–15)
BASOPHILS # BLD AUTO: 0 10E3/UL (ref 0–0.2)
BASOPHILS NFR BLD AUTO: 0 %
BUN SERPL-MCNC: 18.6 MG/DL (ref 6–20)
CALCIUM SERPL-MCNC: 9.5 MG/DL (ref 8.6–10)
CHLORIDE SERPL-SCNC: 107 MMOL/L (ref 98–107)
CHOLEST SERPL-MCNC: 176 MG/DL
CREAT SERPL-MCNC: 0.88 MG/DL (ref 0.67–1.17)
CRP SERPL-MCNC: <3 MG/L
DEPRECATED HCO3 PLAS-SCNC: 24 MMOL/L (ref 22–29)
EOSINOPHIL # BLD AUTO: 0.2 10E3/UL (ref 0–0.7)
EOSINOPHIL NFR BLD AUTO: 4 %
ERYTHROCYTE [DISTWIDTH] IN BLOOD BY AUTOMATED COUNT: 12.6 % (ref 10–15)
GFR SERPL CREATININE-BSD FRML MDRD: >90 ML/MIN/1.73M2
GLUCOSE SERPL-MCNC: 98 MG/DL (ref 70–99)
HCT VFR BLD AUTO: 43.2 % (ref 40–53)
HDLC SERPL-MCNC: 66 MG/DL
HGB BLD-MCNC: 14.3 G/DL (ref 13.3–17.7)
IMM GRANULOCYTES # BLD: 0 10E3/UL
IMM GRANULOCYTES NFR BLD: 0 %
LDLC SERPL CALC-MCNC: 94 MG/DL
LYMPHOCYTES # BLD AUTO: 1.4 10E3/UL (ref 0.8–5.3)
LYMPHOCYTES NFR BLD AUTO: 26 %
MCH RBC QN AUTO: 31 PG (ref 26.5–33)
MCHC RBC AUTO-ENTMCNC: 33.1 G/DL (ref 31.5–36.5)
MCV RBC AUTO: 94 FL (ref 78–100)
MONOCYTES # BLD AUTO: 0.3 10E3/UL (ref 0–1.3)
MONOCYTES NFR BLD AUTO: 6 %
NEUTROPHILS # BLD AUTO: 3.4 10E3/UL (ref 1.6–8.3)
NEUTROPHILS NFR BLD AUTO: 64 %
NONHDLC SERPL-MCNC: 110 MG/DL
PLATELET # BLD AUTO: 230 10E3/UL (ref 150–450)
POTASSIUM SERPL-SCNC: 4.1 MMOL/L (ref 3.4–5.3)
RBC # BLD AUTO: 4.62 10E6/UL (ref 4.4–5.9)
SODIUM SERPL-SCNC: 143 MMOL/L (ref 136–145)
TRIGL SERPL-MCNC: 80 MG/DL
WBC # BLD AUTO: 5.3 10E3/UL (ref 4–11)

## 2023-05-26 PROCEDURE — 80048 BASIC METABOLIC PNL TOTAL CA: CPT | Performed by: NURSE PRACTITIONER

## 2023-05-26 PROCEDURE — 86140 C-REACTIVE PROTEIN: CPT | Performed by: NURSE PRACTITIONER

## 2023-05-26 PROCEDURE — 80061 LIPID PANEL: CPT | Performed by: NURSE PRACTITIONER

## 2023-05-26 PROCEDURE — 36415 COLL VENOUS BLD VENIPUNCTURE: CPT | Performed by: NURSE PRACTITIONER

## 2023-05-26 PROCEDURE — 85025 COMPLETE CBC W/AUTO DIFF WBC: CPT | Performed by: NURSE PRACTITIONER

## 2023-05-26 PROCEDURE — 99203 OFFICE O/P NEW LOW 30 MIN: CPT | Performed by: NURSE PRACTITIONER

## 2023-05-26 ASSESSMENT — PAIN SCALES - GENERAL: PAINLEVEL: NO PAIN (0)

## 2023-05-26 NOTE — PROGRESS NOTES
Assessment & Plan     Hospital discharge follow-up  Symptoms improved. Repeat labs per hospital recommendations. Number provided for him to make follow-up appt with ENT per hospital discharge notes. He wasn't aware he needed to do that.    - CBC with platelets and differential  - CRP, inflammation    Peritonsillar abscess  Resolved/improved. Follow-up with ENT as noted above.     Encounter to establish care  New to this provider. Requesting to establish care. Appt scheduled for annual physical.     Lipid screening  Screening. He is fasting except for a cup of juice 4 hours ago.     - Lipid panel reflex to direct LDL Non-fasting    Screening for diabetes mellitus  Screening. See above.     - Basic metabolic panel  (Ca, Cl, CO2, Creat, Gluc, K, Na, BUN)    Ordering of each unique test       MED REC REQUIRED  Post Medication Reconciliation Status:  Discharge medications reconciled, continue medications without change    Nicotine/Tobacco Cessation:  He reports that he has been smoking. He has been smoking an average of 1 pack per day. He has never used smokeless tobacco.  Nicotine/Tobacco Cessation Plan:   Information offered: Patient not interested at this time      Patient Instructions   Make a follow-up appointment for annual physical.     Note from hospital discharge paperwork:  Recommend ENT Follow up in [place] [facility] [phone] for  throat recheck within 1 week or earlier if symptomatic.      Ada Arshad DNP  [facility]    Jefe Grover is a 57 year old, presenting for the following health issues:  Hospital F/U and restless leg (Wondering about restarting medication to help with this )        5/24/2023     8:08 AM   Additional Questions   Accompanied by ricky KELLOGG       Hospital Follow-up Visit:    Hospital/Nursing Home/IP Rehab Facility: [facility]  Date of Admission: 05/10/23  Date of Discharge: 05/12/23  Reason(s) for  Admission: Left peritonsillar abscess    Was your hospitalization related to COVID-19? No   Problems taking medications regularly:  None  Medication changes since discharge: finished antibiotic none now   Problems adhering to non-medication therapy:  None    Summary of hospitalization:  [facility] hospital discharge summary reviewed  Diagnostic Tests/Treatments reviewed.  Follow up needed: Labs ordered today.  Other Healthcare Providers Involved in Patient s Care:         Surgical follow-up appointment - needs to schedule with ENT.  Update since discharge: improved.   Plan of care communicated with patient     Hospital discharge note from 5/12/23:  Hospital Course  Alejandro Alves is a 57 year old male with tobacco use disorder sent in from [facility] to [facility] with left-sided throat pain, difficulty swallowing and concern for peritonsillar abscess on 5/10/2023.     Left peritonsillar abscess   *Presented with left-sided throat pain and swelling, difficulty swallowing, difficulty speaking.  Associated discomfort in left ear.  On admission pharyngeal swelling, posterior oropharyngeal erythema.   WBC 10.3.  CRP 79.94.  Influenza A, influenza B, RSV, COVID-19 PCR negative.  Group A streptococcal antigen negative.  *CT soft tissue neck with enlargement of left palate teen tonsillar tissues with narrowing of airway at level of oropharynx, irregularly-shaped area of hypoenhancement measuring 2 x 1.5 x 1 cm compatible with developing left peritonsillar abscess, presumed reactive lymphadenopathy     *ENT followed, recommend medical management with steroids and antibiotics and close follow-up in clinic.  -Was on IV Decadron during hospital stay from 5/10 to 5/12.  Switch to oral prednisone 40 mg daily for 3 days on discharge.  Was on IV Unasyn during hospital stay, switch to oral Augmentin twice daily for 10 days on discharge.    Tylenol as needed.  Oral hygiene.  Recommend ENT F/U in [place] with [facility] [phone] for  throat recheck next week or earlier if symptomatic.  Encouraged to consider abstinence from nicotine      Vertigo improved   Patient reported dizziness and room spinning on 5/10, 5/11.   Orthostatic vitals negative.  Electrolytes within normal limits.  Blood glucose 140  EKG sinus rhythm, QTc 461.   -With steroids, antibiotics symptoms improving.  Weaned off narcotics.    Encourage adequate oral hydration.  ENT follow-up as above.     Hyperglycemia from prediabetes and steroids.   Prediabetes with Hemoglobin A1c of 5.9.  Monitor blood sugars periodically.  Age-appropriate health maintenance on PCP visit     Leukocytosis likely reactive from steroid therapy.  WBC count increased to 14.4 from 10.3 while on steroids  Trend WBC count,CRP in 10 days     Health maintenance  Patient's family reportedly expressed concerns regarding patient's reluctance to access healthcare as outpatient.   Will defer health maintenance to outpatient, did discuss with patient to establish primary care.  Requested care coordinator assistance with establishing PCP appointment.     Tobacco use disorder  Reports smokes a pack a day.  Declined nicotine replacement therapy during hospital stay.  Encouraged to consider abstinence.  Needs reinforcement as outpatient    Follow-up and recommended labs and tests      Follow up with primary care provider, within 10 days for hospital follow- up.  Follow WBC count, CRP in 10 days  Age-appropriate health maintenance on PCP visit.  Monitor blood sugars periodically, noted to have prediabetes      Recommend ENT Follow up in [place] with [facility] [phone] for  throat recheck within 1 week or earlier if symptomatic.     -------------------------------------------------------      Additional provider notes: Patient presents in clinic for hospital follow-up from 2 weeks ago for peritonsillar abscess. He has finished his antibiotics and is  feeling much better. He had one episode of sore throat 5-6 days ago, but it resolved.     Wanting to establish care with this provider.    He is fasting today except for a cup of juice 4 hours ago.     No Known Allergies    Current Outpatient Medications   Medication     amoxicillin-clavulanate (AUGMENTIN) 875-125 MG tablet     No current facility-administered medications for this visit.       Past Medical History:   Diagnosis Date     Tobacco use disorder             Review of Systems   HENT: Negative.    All other systems reviewed and are negative.           Objective    /74 (BP Location: Right arm, Patient Position: Sitting, Cuff Size: Adult Regular)   Pulse 104   Temp 97.9  F (36.6  C) (Tympanic)   Resp 28   Wt 67.6 kg (149 lb)   SpO2 100%   BMI 23.50 kg/m    Body mass index is 23.5 kg/m .  Physical Exam  Vitals reviewed.   Constitutional:       General: He is not in acute distress.     Appearance: Normal appearance. He is normal weight. He is not ill-appearing or toxic-appearing.   HENT:      Right Ear: Tympanic membrane, ear canal and external ear normal. There is no impacted cerumen.      Left Ear: Tympanic membrane, ear canal and external ear normal. There is no impacted cerumen.      Nose: Nose normal.      Mouth/Throat:      Mouth: Mucous membranes are moist.      Pharynx: Oropharynx is clear. No posterior oropharyngeal erythema.   Cardiovascular:      Rate and Rhythm: Normal rate and regular rhythm.      Pulses: Normal pulses.   Pulmonary:      Effort: Pulmonary effort is normal.      Breath sounds: Normal breath sounds.   Musculoskeletal:         General: Normal range of motion.   Skin:     General: Skin is warm and dry.   Neurological:      Mental Status: He is alert and oriented to person, place, and time.   Psychiatric:         Behavior: Behavior normal.

## 2023-05-26 NOTE — PATIENT INSTRUCTIONS
Make a follow-up appointment for annual physical.     Note from hospital discharge paperwork:  Recommend ENT Follow up in [place] [facility] [phone] for  throat recheck within 1 week or earlier if symptomatic.

## 2024-05-26 ENCOUNTER — HEALTH MAINTENANCE LETTER (OUTPATIENT)
Age: 59
End: 2024-05-26

## 2024-08-12 ENCOUNTER — OFFICE VISIT (OUTPATIENT)
Dept: INTERNAL MEDICINE | Facility: CLINIC | Age: 59
End: 2024-08-12
Payer: COMMERCIAL

## 2024-08-12 ENCOUNTER — TELEPHONE (OUTPATIENT)
Dept: GASTROENTEROLOGY | Facility: CLINIC | Age: 59
End: 2024-08-12

## 2024-08-12 ENCOUNTER — HOSPITAL ENCOUNTER (OUTPATIENT)
Dept: GENERAL RADIOLOGY | Facility: CLINIC | Age: 59
Discharge: HOME | End: 2024-08-12
Attending: INTERNAL MEDICINE | Admitting: INTERNAL MEDICINE
Payer: COMMERCIAL

## 2024-08-12 ENCOUNTER — MYC MEDICAL ADVICE (OUTPATIENT)
Dept: INTERNAL MEDICINE | Facility: CLINIC | Age: 59
End: 2024-08-12

## 2024-08-12 ENCOUNTER — HOSPITAL ENCOUNTER (OUTPATIENT)
Facility: CLINIC | Age: 59
End: 2024-08-12
Attending: INTERNAL MEDICINE | Admitting: INTERNAL MEDICINE
Payer: COMMERCIAL

## 2024-08-12 VITALS
BODY MASS INDEX: 24.17 KG/M2 | OXYGEN SATURATION: 98 % | DIASTOLIC BLOOD PRESSURE: 70 MMHG | RESPIRATION RATE: 14 BRPM | WEIGHT: 154 LBS | SYSTOLIC BLOOD PRESSURE: 124 MMHG | TEMPERATURE: 98.4 F | HEIGHT: 67 IN | HEART RATE: 72 BPM

## 2024-08-12 DIAGNOSIS — R10.84 ABDOMINAL PAIN, GENERALIZED: Primary | ICD-10-CM

## 2024-08-12 DIAGNOSIS — K92.1 BLOOD IN STOOL: ICD-10-CM

## 2024-08-12 DIAGNOSIS — R59.1 LYMPHADENOPATHY OF HEAD AND NECK: ICD-10-CM

## 2024-08-12 DIAGNOSIS — R41.89: ICD-10-CM

## 2024-08-12 DIAGNOSIS — M54.2 NECK PAIN: ICD-10-CM

## 2024-08-12 DIAGNOSIS — R73.09 ELEVATED GLUCOSE: ICD-10-CM

## 2024-08-12 DIAGNOSIS — F17.200 TOBACCO USE DISORDER: ICD-10-CM

## 2024-08-12 LAB
ALBUMIN SERPL BCG-MCNC: 4.2 G/DL (ref 3.5–5.2)
ALP SERPL-CCNC: 97 U/L (ref 40–150)
ALT SERPL W P-5'-P-CCNC: 21 U/L (ref 0–70)
AMYLASE SERPL-CCNC: 43 U/L (ref 28–100)
ANION GAP SERPL CALCULATED.3IONS-SCNC: 9 MMOL/L (ref 7–15)
AST SERPL W P-5'-P-CCNC: 23 U/L (ref 0–45)
BASOPHILS # BLD AUTO: 0.1 10E3/UL (ref 0–0.2)
BASOPHILS NFR BLD AUTO: 1 %
BILIRUB SERPL-MCNC: 0.3 MG/DL
BUN SERPL-MCNC: 15.3 MG/DL (ref 8–23)
CALCIUM SERPL-MCNC: 9.1 MG/DL (ref 8.8–10.4)
CHLORIDE SERPL-SCNC: 104 MMOL/L (ref 98–107)
CREAT SERPL-MCNC: 0.9 MG/DL (ref 0.67–1.17)
EGFRCR SERPLBLD CKD-EPI 2021: >90 ML/MIN/1.73M2
EOSINOPHIL # BLD AUTO: 0.4 10E3/UL (ref 0–0.7)
EOSINOPHIL NFR BLD AUTO: 5 %
ERYTHROCYTE [DISTWIDTH] IN BLOOD BY AUTOMATED COUNT: 13.1 % (ref 10–15)
GLUCOSE SERPL-MCNC: 69 MG/DL (ref 70–99)
HBA1C MFR BLD: 5.6 %
HCO3 SERPL-SCNC: 28 MMOL/L (ref 22–29)
HCT VFR BLD AUTO: 40.1 % (ref 40–53)
HGB BLD-MCNC: 13.6 G/DL (ref 13.3–17.7)
IMM GRANULOCYTES # BLD: 0 10E3/UL
IMM GRANULOCYTES NFR BLD: 0 %
LIPASE SERPL-CCNC: 29 U/L (ref 13–60)
LYMPHOCYTES # BLD AUTO: 1.6 10E3/UL (ref 0.8–5.3)
LYMPHOCYTES NFR BLD AUTO: 22 %
MCH RBC QN AUTO: 30.9 PG (ref 26.5–33)
MCHC RBC AUTO-ENTMCNC: 33.9 G/DL (ref 31.5–36.5)
MCV RBC AUTO: 91 FL (ref 78–100)
MONOCYTES # BLD AUTO: 0.5 10E3/UL (ref 0–1.3)
MONOCYTES NFR BLD AUTO: 6 %
NEUTROPHILS # BLD AUTO: 4.7 10E3/UL (ref 1.6–8.3)
NEUTROPHILS NFR BLD AUTO: 66 %
NRBC # BLD AUTO: 0 10E3/UL
NRBC BLD AUTO-RTO: 0 /100
PLATELET # BLD AUTO: 231 10E3/UL (ref 150–450)
POTASSIUM SERPL-SCNC: 3.7 MMOL/L (ref 3.4–5.3)
PROT SERPL-MCNC: 7.2 G/DL (ref 6.4–8.3)
RBC # BLD AUTO: 4.4 10E6/UL (ref 4.4–5.9)
SODIUM SERPL-SCNC: 141 MMOL/L (ref 135–145)
WBC # BLD AUTO: 7.1 10E3/UL (ref 4–11)

## 2024-08-12 PROCEDURE — 72040 X-RAY EXAM NECK SPINE 2-3 VW: CPT

## 2024-08-12 PROCEDURE — 83690 ASSAY OF LIPASE: CPT | Performed by: INTERNAL MEDICINE

## 2024-08-12 PROCEDURE — 83036 HEMOGLOBIN GLYCOSYLATED A1C: CPT | Performed by: INTERNAL MEDICINE

## 2024-08-12 PROCEDURE — 85025 COMPLETE CBC W/AUTO DIFF WBC: CPT | Performed by: INTERNAL MEDICINE

## 2024-08-12 PROCEDURE — 36415 COLL VENOUS BLD VENIPUNCTURE: CPT | Performed by: INTERNAL MEDICINE

## 2024-08-12 PROCEDURE — 82150 ASSAY OF AMYLASE: CPT | Performed by: INTERNAL MEDICINE

## 2024-08-12 PROCEDURE — 80053 COMPREHEN METABOLIC PANEL: CPT | Performed by: INTERNAL MEDICINE

## 2024-08-12 PROCEDURE — 99214 OFFICE O/P EST MOD 30 MIN: CPT | Performed by: INTERNAL MEDICINE

## 2024-08-12 NOTE — TELEPHONE ENCOUNTER
"Please call pt's sister Emely for pre-assessment. Consent to communicate on file    Endoscopy Scheduling Screen    Have you had a positive Covid test in the last 14 days?  No    What is your communication preference for Instructions and/or Bowel Prep?   MyChart    What insurance is in the chart?  Other:  [employer]    Ordering/Referring Provider: DARRYN MCGEE    (If ordering provider performs procedure, schedule with ordering provider unless otherwise instructed. )    BMI: Estimated body mass index is 24.31 kg/m  as calculated from the following:    Height as of an earlier encounter on 8/12/24: 1.695 m (5' 6.73\").    Weight as of an earlier encounter on 8/12/24: 69.9 kg (154 lb).     Sedation Ordered  moderate sedation.   If patient BMI > 50 do not schedule in [facility].    If patient [facility] > 45 [facility]    Are you taking methadone or Suboxone?  No    Have you had difficulties, pain, or discomfort during past endoscopy procedures?  No    Are you taking any prescription medications for pain 3 or more times per week?   NO, No RN review required.    Do you have a history of malignant hyperthermia?  No    Have you been diagnosed or told you have pulmonary hypertension?   No    Do you have an LVAD?  No    Have you been told you have moderate to severe sleep apnea?  No    Have you been told you have COPD, asthma, or any other lung disease?  No    Do you have any heart conditions?  No     Have you ever had or are you waiting for an organ transplant?  No. Continue scheduling, no site restrictions.    Have you had a stroke or transient ischemic attack (TIA aka \"mini stroke\" in the last 6 months?   No    Have you been diagnosed with or been told you have cirrhosis of the liver?   No    Are you currently on dialysis?   No    Do you need assistance transferring?   No    BMI: Estimated body mass index is 24.31 kg/m  as calculated from the following:    Height as of an earlier encounter on 8/12/24: 1.695 m (5' 6.73\").    " Weight as of an earlier encounter on 8/12/24: 69.9 kg (154 lb).     Is patients BMI > 40 and scheduling location [facility]?  No    Do you take an injectable medication for weight loss or diabetes (excluding insulin)?  No    Do you take the medication Naltrexone?  No    Do you take blood thinners?  No       Prep   Are you currently on dialysis or do you have chronic kidney disease?  No    Do you have a diagnosis of diabetes?  No    Do you have a diagnosis of cystic fibrosis (CF)?  No    On a regular basis do you go 3 -5 days between bowel movements?  No    BMI > 40?  No    Preferred Pharmacy:    [facility] [address] [place], [place] - [address] [facility]  [address] [address] [address]  Phone: [phone] Fax: [phone]    Final Scheduling Details     Procedure scheduled  Colonoscopy    Surgeon:  PAVITHRA     Date of procedure:  10/08/2024     Pre-OP / PAC:   No - Not required for this site.    Location  PH - Patient preference.    Sedation   MAC/Deep Sedation  Per Location      Patient Reminders:   You will receive a call from a Nurse to review instructions and health history.  This assessment must be completed prior to your procedure.  Failure to complete the Nurse assessment may result in the procedure being cancelled.      On the day of your procedure, please designate an adult(s) who can drive you home stay with you for the next 24 hours. The medicines used in the exam will make you sleepy. You will not be able to drive.      You cannot take public transportation, ride share services, or non-medical taxi service without a responsible caregiver.  Medical transport services are allowed with the requirement that a responsible caregiver will receive you at your destination.  We require that drivers and caregivers are confirmed prior to your procedure.

## 2024-08-12 NOTE — PROGRESS NOTES
Assessment & Plan   Problem List Items Addressed This Visit       Tobacco use disorder     Other Visit Diagnoses       Abdominal pain, generalized    -  Primary    Relevant Orders    Comprehensive metabolic panel (BMP + Alb, Alk Phos, ALT, AST, Total. Bili, TP)    CBC with platelets and differential    Lipase    Amylase    Low average cognitive ability        Relevant Orders    Primary Care - Care Coordination Referral    Blood in stool        Relevant Orders    Adult GI  Referral - Procedure Only    Neck pain        Relevant Orders    XR Cervical Spine G/E 4 Views    Lymphadenopathy of head and neck        Relevant Orders    Comprehensive metabolic panel (BMP + Alb, Alk Phos, ALT, AST, Total. Bili, TP)    CBC with platelets and differential    CT Soft Tissue Neck w Contrast    Elevated glucose        Relevant Orders    Hemoglobin A1c           Patient is here with his sister to establish care.  He lives on his own in [place] but his father and sister pays his bills has not worked for many years.  He has some history of concussions some cognitive changes with that and fully diagnosed but may be just low ability for most of his life.  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  He is not on any medications but now comes in with neck pain, chronic abdominal pain previous hernia surgery and some tenderness in his testicles.    Previous peritonsillar abscess and lymphadenopathy with his smoking history organ to repeat a CT scan.  Look at his neck to make sure that is better although he does have poor dentition which could cause him some reactive lymph nodes.    Elevated glucose we will check his A1c and other labs including LFTs kidney function and CBC.    Some blood in his stool he reports an abnormal stools with abdominal pain recommended he go on fiber on a daily basis and get a colonoscopy and then possibly  redo in abdominal CT which was done a few years ago.    Will try to get care coordination to help to see if he can get any other benefits and see if he can work in the future.    His sister is listed as consent to communicate and she is trying to get other legal control.    Tobacco use recommend he quit smoking but will work on that in the future.    Neck pain will check a neck x-ray for structural use Voltaren gel and heat on the area.    Follow-up in 1 month.                 Nicotine/Tobacco Cessation  He reports that he has been smoking. He has never used smokeless tobacco.  Nicotine/Tobacco Cessation Plan  Information offered: Patient not interested at this time      FUTURE APPOINTMENTS:       - Follow-up visit in 1 month      Subjective   Reilly is a 58 year old, presenting for the following health issues:  Establish Care        8/12/2024     6:58 AM   Additional Questions   Roomed by Aundrea moralez     History of Present Illness       Reason for visit:  Review all medical concerns (cognitive/memory changes, severe reoccurring abdominal pain, enlarged scrotum, shoulder and knee pain, weight loss, recheck hernia repair, restless leg issues, and create partnership  with doctor to improve quality of life.    He eats 0-1 servings of fruits and vegetables daily.He consumes 6 sweetened beverage(s) daily.He exercises with enough effort to increase his heart rate 9 or less minutes per day.  He exercises with enough effort to increase his heart rate 3 or less days per week.      Look at throat, left side.     Here with his sister, family helping financially with bills.     Not working the last 5  years with cognitive issues.    Lives alone on [facility] and [address].    Cooks some, on SNAP.     Smokes cigarettes for years pack a day, rolls his own.    Not drinking alcohol, had before heavily.    Still left throat pain and had a peritonsilar abscess in 2023, some pain still and hard to swallow the last 3 months.       Objective   "  /70   Pulse 72   Temp 98.4  F (36.9  C)   Resp 14   Ht 1.695 m (5' 6.73\")   Wt 69.9 kg (154 lb)   SpO2 98%   BMI 24.31 kg/m    Body mass index is 24.31 kg/m .  Physical Exam   No acute distress accompanied by his sister.  Poor dentition  Ears are clear  Neck is tight and tender on the right side  Heart is regular  Lungs are clear  Abdomen is difficult to examine because he is ticklish, no masses   exam he has normal testicles but tender left is lower than right  Extremities no edema.            Signed Electronically by: Tyrone Rhodes MD    "

## 2024-08-13 ENCOUNTER — PATIENT OUTREACH (OUTPATIENT)
Dept: CARE COORDINATION | Facility: CLINIC | Age: 59
End: 2024-08-13
Payer: COMMERCIAL

## 2024-08-13 NOTE — LETTER
[facility] CARE COORDINATION  [address] [address]    August 14, 2024    Alejandro Alves  [address] [address] [address]      Dear Reilly,    I am a clinic community health worker who works with Tyrone Rhodes MD with the [facility]. I have been trying to reach you recently to introduce Clinic Care Coordination. Below is a description of clinic care coordination and how we can further assist you.       The clinic care coordination team is made up of a registered nurse, , financial resource worker and community health worker who understand the health care system. The goal of clinic care coordination is to help you manage your health and improve access to the health care system. Our team works alongside your provider to assist you in determining your health and social needs. We can help you obtain health care and community resources, providing you with necessary information and education. We can work with you through any barriers and develop a care plan that helps coordinate and strengthen the communication between you and your care team.  Our services are voluntary and are offered without charge to you personally.    Please feel free to contact me with any questions or concerns regarding care coordination and what we can offer.      We are focused on providing you with the highest-quality healthcare experience possible.    Sincerely,     RENE De La Fuente  [phone]  [facility] Care Resource [facility]

## 2024-08-14 NOTE — PROGRESS NOTES
Clinic Care Coordination Contact  [facility]/Voicemail    Clinical Data: Care Coordinator Outreach    Outreach Documentation Number of Outreach Attempt   8/13/2024  11:36 AM 1   8/14/2024   2:00 PM 2       Left message on patient's voicemail with call back information and requested return call.    Plan: Care Coordinator will send care coordination introduction letter with care coordinator contact information and explanation of care coordination services via [employer]. Care Coordinator will do no further outreaches at this time.    RENE De La Fuente  [phone]  [facility] Care Resource [facility]

## 2024-08-21 ENCOUNTER — PATIENT OUTREACH (OUTPATIENT)
Dept: CARE COORDINATION | Facility: CLINIC | Age: 59
End: 2024-08-21
Payer: COMMERCIAL

## 2024-08-21 ENCOUNTER — HOSPITAL ENCOUNTER (OUTPATIENT)
Dept: CT IMAGING | Facility: CLINIC | Age: 59
Discharge: HOME | End: 2024-08-21
Attending: INTERNAL MEDICINE
Payer: COMMERCIAL

## 2024-08-21 DIAGNOSIS — R59.1 LYMPHADENOPATHY OF HEAD AND NECK: ICD-10-CM

## 2024-08-21 DIAGNOSIS — R10.84 ABDOMINAL PAIN, GENERALIZED: ICD-10-CM

## 2024-08-21 PROCEDURE — 250N000009 HC RX 250: Performed by: INTERNAL MEDICINE

## 2024-08-21 PROCEDURE — 250N000011 HC RX IP 250 OP 636: Performed by: INTERNAL MEDICINE

## 2024-08-21 PROCEDURE — 70491 CT SOFT TISSUE NECK W/DYE: CPT

## 2024-08-21 PROCEDURE — 74177 CT ABD & PELVIS W/CONTRAST: CPT

## 2024-08-21 RX ORDER — IOPAMIDOL 755 MG/ML
500 INJECTION, SOLUTION INTRAVASCULAR ONCE
Status: COMPLETED | OUTPATIENT
Start: 2024-08-21 | End: 2024-08-21

## 2024-08-21 RX ADMIN — IOPAMIDOL 100 ML: 755 INJECTION, SOLUTION INTRAVENOUS at 16:05

## 2024-08-21 RX ADMIN — SODIUM CHLORIDE 60 ML: 9 INJECTION, SOLUTION INTRAVENOUS at 16:03

## 2024-08-21 NOTE — PROGRESS NOTES
Clinic Care Coordination Contact  Community Health Worker Initial Outreach    CHW Initial Information Gathering:  Referral Source: PCP  Preferred Hospital: [facility]  [phone]  Preferred Urgent Care: [facility], [phone]  Current living arrangement:: I live alone  Type of residence:: Private home - stairs  Community Resources: [employer]  Supplies Currently Used at Home: None  Equipment Currently Used at Home: none  Informal Support system:: Family  No PCP office visit in Past Year: No  Transportation means:: Family       Patient accepts CC: Yes. Patient scheduled for assessment with the SW on 8/23/24 at 9:00 am. Patient noted desire to discuss assistance with financial support, food, housing and insurance.     ESTER De La FuenteW  [phone]  [facility] Care Resource [facility]

## 2024-08-23 ENCOUNTER — PATIENT OUTREACH (OUTPATIENT)
Dept: FAMILY MEDICINE | Facility: CLINIC | Age: 59
End: 2024-08-23
Payer: COMMERCIAL

## 2024-08-23 NOTE — LETTER
Dear Reilly/Emely,    I am a clinic care coordinator who works with Tyrone Rhodes MD with the [facility]. I wanted to thank you for spending the time to talk with me.  Below is a description of clinic care coordination and how I can further assist you.       The clinic care coordination team is made up of a registered nurse, , financial resource worker and community health worker who understand the health care system. The goal of clinic care coordination is to help you manage your health and improve access to the health care system. Our team works alongside your provider to assist you in determining your health and social needs. We can help you obtain health care and community resources, providing you with necessary information and education. We can work with you through any barriers and develop a care plan that helps coordinate and strengthen the communication between you and your care team.  Our services are voluntary and are offered without charge to you personally.    Please feel free to contact me with any questions or concerns regarding care coordination and what we can offer.      We are focused on providing you with the highest-quality healthcare experience possible.    Sincerely,     Rabia Mayer [facility] Primary [facility] - Care Coordination  [facility]   [phone]      Enclosed: I have enclosed a copy of the Patient Centered Plan of Care. This has helpful information and goals that we have talked about. Please keep this in an easy to access place to use as needed.       [facility]  Patient Centered Plan of Care  About Me:        Patient Name:  Alejandro Alves    YOB: 1965  Age:         58 year old   [place] MRN:    [id number] Telephone Information:  Home Phone [phone]   Mobile [phone]       Address:  [address] [address] Email address:  [email or web address]       Emergency Contact(s)    Name Relationship Lgl Grd Work Phone Home Phone Mobile Phone   Gómez RAJPUT Sister    [phone]           Primary language:  English     needed? No   [place] [employer]:  [phone] op. 1  Other communication barriers:Other (per sister may not be accurate or not share and uncertain of cause)    Preferred Method of Communication:  Mail  Current living arrangement: I live alone    Mobility Status/ Medical Equipment: Independent        Health Maintenance  Health Maintenance Reviewed: Due/Overdue   Health Maintenance Due   Topic Date Due    ADVANCE CARE PLANNING  Never done    HEPATITIS B IMMUNIZATION (1 of 3 - 19+ 3-dose series) Never done    ZOSTER IMMUNIZATION (1 of 2) Never done    LUNG CANCER SCREENING  Never done    YEARLY PREVENTIVE VISIT  04/20/2017    DTAP/TDAP/TD IMMUNIZATION (1 - Tdap) 11/27/2018    Pneumococcal Vaccine: Pediatrics (0 to 5 Years) and At-Risk Patients (6 to 64 Years) (2 of 2 - PPSV23 or PCV20) 01/21/2019    COVID-19 Vaccine (1 - 2023-24 season) Never done    PHQ-2 (once per calendar year)  01/01/2024           My Access Plan  Medical Emergency 911   Primary Clinic Line [facility] - [phone]   24 Hour Appointment Line [phone] or  [phone]) (toll-free)   24 Hour Nurse Line [phone] (toll-free)   Preferred Urgent Care [facility], [phone]     [facility]  [phone]     Preferred Pharmacy [facility] [address] [place] [address] [address]     Behavioral Health Crisis Line The National Suicide Prevention Lifeline at 1-863.963.3765 or Text/Call 378           My Care Team Members  Patient Care Team         Relationship Specialty Notifications Start End    Tyrone Rhodes MD PCP - General   8/8/24     Phone: [phone] Fax: [phone] [address] [facility] [address]    Ada Arshad,  DNP Assigned PCP   6/10/23     Phone: [phone] Fax: [phone]         [address] [address]    Rabia Mayer LSW Lead Care Coordinator Primary Care - CC Admissions 8/21/24     Phone: [phone] Fax: [phone]                    My Care Plans  Self Management and Treatment Plan    Care Plan  Care Plan: Mental Health       Problem: Mental Health Symptoms Need Improvement       Long-Range Goal: Improve management of mental health symptoms and establish with mental health/psychosocial supports       Start Date: 8/23/2024 Expected End Date: 8/23/2025    Note:     Barriers: unknown concerns  Strengths: supportive family   Patient expressed understanding of goal: yes  Action steps to achieve this goal:  1. I will complete a neuropsych assessment  2. I will complete a diagnostic assessment with mental health provider and counseling  3. I will work with care coordination to identify and connect with resources as assessments are completed, such as [facility] , [facility] supports, etc                                Action Plans on File:                       Advance Care Plans/Directives:             My Medical and Care Information  Problem List   Patient Active Problem List   Diagnosis    Tobacco use disorder    Restless legs syndrome (RLS)    Hip pain, right    Subacromial impingement, right    Superior glenoid labrum lesion of right shoulder, initial encounter    Incomplete tear of right rotator cuff    Acute foreign body of left knee, initial encounter    Foreign body of knee, left, initial encounter    Peritonsillar abscess    Hernia, abdominal        Care Coordination Start Date: 8/21/2024   Frequency of Care Coordination: monthly, more frequently as needed     Form Last Updated: 08/23/2024

## 2024-08-23 NOTE — Clinical Note
I spoke with sister today and she agreed that getting Reilly assessed by counseling for a good diagnostic assessment and a neuropsych assessment to establish where he is with his functioning level are needed for proper treatment and supports.   Are you willing to refer now or at his next appointment?  She wanted you to know he has used meth yet not sure how recently he has and she feels he is trying to self medicate an undiagnosed MH concern.  He has always gone more for stimulant illicit drugs and not downers.  She did not want to bring this up sitting next to him at the appointment for concern of his reaction with it being a first meeting with you.  Rabia Mayer, [facility] Primary Care - Care Coordination [facility]  [phone]

## 2024-08-23 NOTE — PROGRESS NOTES
Clinic Care Coordination Contact  Clinic Care Coordination Contact  OUTREACH    Referral Information:  Referral Source: PCP    Primary Diagnosis: Behavioral Health    Chief Complaint   Patient presents with    Clinic Care Coordination - Initial     SW CC        [place] Utilization: new to  yet per sister pt is not good about following up with cares.  Clinic Utilization  Difficulty keeping appointments:: Yes  Compliance Concerns: Yes  No-Show Concerns: Yes  No PCP office visit in Past Year: No  Utilization      No Show Count (past year)  0             ED Visits  0             Hospital Admissions  0                    Current as of: 8/22/2024  4:41 PM                Clinical Concerns:  Current Medical Concerns:  pt has dental abscess and additional medical concerns that are being uncovered by family and new provider.    Current Behavioral Concerns: sister feels there are some undiagnosed MH concerns.  She said that ADHD runs in the family and she feels pt has been self medicating with illicit drugs over the years and most recently meth use.   She noted that he is very smart in many areas yet with daily living and daily routine things he is not good at completing these tasks without prompting.  He has always lived with someone else who helped manage these things and now that he is living alone family is noticing deficits.     Education Provided to patient: discussed having provider refer to MH for diagnostic assessment and potential counseling and a neuropsych referral for an accurate assessment of his abilities.  Sister agrees with both of these needs.   Goal created.    Pain  Pain (GOAL):: Yes  Health Maintenance Reviewed: Due/Overdue   Health Maintenance Due   Topic Date Due    ADVANCE CARE PLANNING  Never done    HEPATITIS B IMMUNIZATION (1 of 3 - 19+ 3-dose series) Never done    ZOSTER IMMUNIZATION (1 of 2) Never done    LUNG CANCER SCREENING  Never done    YEARLY PREVENTIVE VISIT  04/20/2017    DTAP/TDAP/TD  IMMUNIZATION (1 - Tdap) 11/27/2018    Pneumococcal Vaccine: Pediatrics (0 to 5 Years) and At-Risk Patients (6 to 64 Years) (2 of 2 - PPSV23 or PCV20) 01/21/2019    COVID-19 Vaccine (1 - 2023-24 season) Never done    PHQ-2 (once per calendar year)  01/01/2024       Clinical Pathway: None    Medication Management:  Medication review status: pt not on any medications     Functional Status:  Dependent ADLs:: Independent  Dependent IADLs:: Shopping, Money Management, Meal Preparation  Bed or wheelchair confined:: No  Mobility Status: Independent  Fallen 2 or more times in the past year?: No  Any fall with injury in the past year?: No    Living Situation:  Current living arrangement:: I live alone  Type of residence:: Private home - stairs    Lifestyle & Psychosocial Needs:    Social Determinants of Health     Food Insecurity: Not on file   Depression: Not at risk (5/26/2023)    PHQ-2     PHQ-2 Score: 0   Housing Stability: High Risk (8/23/2024)    Housing Stability     Do you have housing? : Yes     Are you worried about losing your housing?: Yes   Tobacco Use: High Risk (8/12/2024)    Patient History     Smoking Tobacco Use: Every Day     Smokeless Tobacco Use: Never     Passive Exposure: Not on file   Financial Resource Strain: High Risk (8/23/2024)    Financial Resource Strain     Within the past 12 months, have you or your family members you live with been unable to get utilities (heat, electricity) when it was really needed?: Yes   Alcohol Use: Not on file   Transportation Needs: Low Risk  (8/23/2024)    Transportation Needs     Within the past 12 months, has lack of transportation kept you from medical appointments, getting your medicines, non-medical meetings or appointments, work, or from getting things that you need?: No   Physical Activity: Not on file   Interpersonal Safety: Low Risk  (8/23/2024)    Interpersonal Safety     Do you feel physically and emotionally safe where you currently live?: Yes     Within  the past 12 months, have you been hit, slapped, kicked or otherwise physically hurt by someone?: No     Within the past 12 months, have you been humiliated or emotionally abused in other ways by your partner or ex-partner?: No   Stress: Not on file   Social Connections: Not on file   Health Literacy: Not on file     Diet:: Regular  Inadequate nutrition (GOAL):: No  Tube Feeding: No  Inadequate activity/exercise (GOAL):: No  Significant changes in sleep pattern (GOAL): No (has always been up more at night)  Transportation means:: Family     Mormon or spiritual beliefs that impact treatment:: No  Mental health DX:: No  Chemical Dependency Status: Past Concern (potential current concerns)  Informal Support system:: Family, Parent        Spoke with , renee on file.  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  She does not believe he has the necessary work credit hours for disability and also no diagnosed conditions that would indicate his inability to do gainful employment.  She has contacted the [facility] for supports and is holding off on the workability report as she feels they need some assessments first. She feels pt is able to do many of the ADL's on his own yet IADL's are questionable.  He has poor follow through on appointments and higher level tasks.  She does not feel this is solely due to cognitive deficits and feels it is related to an undiagnosed MH concern. She noted he has told her he takes Meth at times and has used other stimulant illicit drugs to potentially manage ADHD (not diagnosed yet strong family history). At this time his ex-girlfriend has been helping with some things, sister's with some things and father helping financially.  He has poor follow through with appointments and sister has been trying to help with these tasks.     Resources sent for dental options as pt needs his teeth/mouth looked at sooner then later.  Will route to PCP to ask for referrals for MH counseling/DA and neuropsych for good assessment of abilities and needs.  These will help identify next routes to go and help with obtaining additional supports.       Resources and Interventions:  Current Resources:      Community Resources: [facility]  Supplies Currently Used at Home: None  Equipment Currently Used at Home: none             Care Plan:  Care Plan: Mental Health       Problem: Mental Health Symptoms Need Improvement       Long-Range Goal: Improve management of mental health symptoms and establish with mental health/psychosocial supports       Start Date: 8/23/2024 Expected End Date: 8/23/2025    Note:     Barriers: unknown concerns  Strengths: supportive family   Patient expressed understanding of goal: yes  Action steps to achieve this goal:  1. I will complete a neuropsych assessment  2. I will complete a diagnostic assessment with mental health provider and counseling  3. I will work with care coordination to identify and connect with resources as assessments are completed, such as [facility] , [facility] supports, etc                                Patient/Caregiver understanding: sister to help schedule appointments.    Outreach Frequency: monthly, more frequently as needed  Future Appointments                In 2 weeks Tyrone Rhodes MD [facility]            Plan: will send care plan/introduction letter and resources via [employer]. Will route to provider for referrals now or to consider for appt on the 11th. Will send [employer] message in three weeks to set up time to talk with sister the following week.     MESSI Muñoz   [place] [facility] - Care Coordination  [facility]   [phone]

## 2024-09-09 ENCOUNTER — PATIENT OUTREACH (OUTPATIENT)
Dept: CARE COORDINATION | Facility: CLINIC | Age: 59
End: 2024-09-09
Payer: COMMERCIAL

## 2024-09-09 NOTE — PROGRESS NOTES
Clinic Care Coordination Contact  Care Team Conversations    Sending a [employer]ER[employer] assistantt message to pt/sister to see when they want to touch base next week.  If no reply will send a message again within 10 business days.     Rabia Mayer LAM   [place] [facility] - Care Coordination  [facility]   [phone]

## 2024-09-11 ENCOUNTER — TELEPHONE (OUTPATIENT)
Dept: INTERNAL MEDICINE | Facility: CLINIC | Age: 59
End: 2024-09-11

## 2024-09-11 NOTE — PROGRESS NOTES
Clinic Care Coordination Contact    Appt scheduled for Wednesday the 18th of September at 2:45 pm.     Rabia Mayer [facility] Primary Care - Care Coordination  [facility]   [phone]

## 2024-09-11 NOTE — TELEPHONE ENCOUNTER
The patients sister Emely called and hasn't been able to find the patient. She just wanted you to know she tried.    Kisha Arcos LPN

## 2024-09-16 ENCOUNTER — MYC MEDICAL ADVICE (OUTPATIENT)
Dept: INTERNAL MEDICINE | Facility: CLINIC | Age: 59
End: 2024-09-16
Payer: COMMERCIAL

## 2024-09-16 DIAGNOSIS — R41.89: Primary | ICD-10-CM

## 2024-09-17 ENCOUNTER — TELEPHONE (OUTPATIENT)
Dept: NEUROLOGY | Facility: CLINIC | Age: 59
End: 2024-09-17
Payer: COMMERCIAL

## 2024-09-17 NOTE — TELEPHONE ENCOUNTER
The [facility] Adult Neuropsychology clinic has had to limit the number of referrals we can accept. Until further notice, we recommend all patients under the age of 60 who do not have an identified or suspected neurological condition be referred to external neuropsychologists. We apologize for the inconvenience.    There are several community neuropsychologists that we might suggest, including:  Dr. Cathy Gary - [phone]  Dr. Teo Allen - [phone]   Dr. Nuzhat Wade - [phone]   Dr. Alis Chamorro - [phone]   Dr. Torrie Holman - [phone]   [facility] - [phone], [email or web address]  Dr. Gee Clements - [phone], [email or web address]   [facility] - [phone], [email or web address]   [facility] Neurology, [email or web address]  [facility] - [phone], [email or web address]     Sincerely,  Joby Hull   Psychometrist

## 2024-09-17 NOTE — TELEPHONE ENCOUNTER
Standard Miralax Bowel Prep recommended due to standard bowel prep. Instructions were sent via [employer].

## 2024-09-18 ENCOUNTER — PATIENT OUTREACH (OUTPATIENT)
Dept: FAMILY MEDICINE | Facility: CLINIC | Age: 59
End: 2024-09-18
Payer: COMMERCIAL

## 2024-09-18 NOTE — PROGRESS NOTES
Clinic Care Coordination Contact  Follow Up Progress Note      Assessment: sister is trying to help to get neuropsych assessment and we talked about the [employer] message and why referral was sent to [facility].  She will call to get this scheduled.  She has been trying to find dental and no one has been willing to  another MA pt at this time.     Sister noted continued challenges with getting pt to appointments.  It is not because of pt not wanting to yet he has poor planning/thought process and will be distracted by other activities he needs to do and thinks he can get them done in time.     Care Gaps:    Health Maintenance Due   Topic Date Due    ADVANCE CARE PLANNING  Never done    HEPATITIS B IMMUNIZATION (1 of 3 - 19+ 3-dose series) Never done    ZOSTER IMMUNIZATION (1 of 2) Never done    LUNG CANCER SCREENING  Never done    YEARLY PREVENTIVE VISIT  04/20/2017    DTAP/TDAP/TD IMMUNIZATION (1 - Tdap) 11/27/2018    Pneumococcal Vaccine: Pediatrics (0 to 5 Years) and At-Risk Patients (6 to 64 Years) (2 of 2 - PPSV23 or PCV20) 01/21/2019    PHQ-2 (once per calendar year)  01/01/2024    INFLUENZA VACCINE (1) 09/01/2024    COVID-19 Vaccine (1 - 2024-25 season) Never done       Postponed to future focus as dental and neuropsych are needed first.      Care Plans  Care Plan: Mental Health       Problem: Mental Health Symptoms Need Improvement       Long-Range Goal: Improve management of mental health symptoms and establish with mental health/psychosocial supports       Start Date: 8/23/2024 Expected End Date: 8/23/2025    This Visit's Progress: 10%    Note:     Barriers: unknown concerns  Strengths: supportive family   Patient expressed understanding of goal: yes  Action steps to achieve this goal:  1. I will complete a neuropsych assessment  2. I will complete a diagnostic assessment with mental health provider and counseling  3. I will work with care coordination to identify and connect with resources  as assessments are completed, such as [facility] , [facility] supports, etc                                Intervention/Education provided during outreach: encouraged continued attempts to schedule.  Consider calling insurance for options for dental.      Outreach Frequency: monthly, more frequently as needed      Plan:   Sister to help schedule neuropsych and dental.  Care Coordinator will follow up in three weeks with a [employer] message to set up a call the next week.     MESSI Muñoz  [facility] Primary Care - Care Coordinator   9/18/2024   3:10 PM  [phone]

## 2024-09-25 ENCOUNTER — OFFICE VISIT (OUTPATIENT)
Dept: INTERNAL MEDICINE | Facility: CLINIC | Age: 59
End: 2024-09-25
Payer: COMMERCIAL

## 2024-09-25 VITALS
HEIGHT: 68 IN | BODY MASS INDEX: 24.14 KG/M2 | DIASTOLIC BLOOD PRESSURE: 74 MMHG | RESPIRATION RATE: 16 BRPM | TEMPERATURE: 97.9 F | WEIGHT: 159.3 LBS | OXYGEN SATURATION: 100 % | HEART RATE: 69 BPM | SYSTOLIC BLOOD PRESSURE: 130 MMHG

## 2024-09-25 DIAGNOSIS — F90.1 ATTENTION DEFICIT HYPERACTIVITY DISORDER (ADHD), PREDOMINANTLY HYPERACTIVE TYPE: ICD-10-CM

## 2024-09-25 DIAGNOSIS — G89.29 CHRONIC LEFT SACROILIAC PAIN: Primary | ICD-10-CM

## 2024-09-25 DIAGNOSIS — F43.23 ADJUSTMENT DISORDER WITH MIXED ANXIETY AND DEPRESSED MOOD: ICD-10-CM

## 2024-09-25 DIAGNOSIS — M53.3 CHRONIC LEFT SACROILIAC PAIN: Primary | ICD-10-CM

## 2024-09-25 LAB — TSH SERPL DL<=0.005 MIU/L-ACNC: 1.71 UIU/ML (ref 0.3–4.2)

## 2024-09-25 PROCEDURE — G2211 COMPLEX E/M VISIT ADD ON: HCPCS | Performed by: INTERNAL MEDICINE

## 2024-09-25 PROCEDURE — 84443 ASSAY THYROID STIM HORMONE: CPT | Performed by: INTERNAL MEDICINE

## 2024-09-25 PROCEDURE — 36415 COLL VENOUS BLD VENIPUNCTURE: CPT | Performed by: INTERNAL MEDICINE

## 2024-09-25 PROCEDURE — 99214 OFFICE O/P EST MOD 30 MIN: CPT | Performed by: INTERNAL MEDICINE

## 2024-09-25 RX ORDER — PREDNISONE 20 MG/1
20 TABLET ORAL DAILY
Qty: 5 TABLET | Refills: 0 | Status: SHIPPED | OUTPATIENT
Start: 2024-09-25

## 2024-09-25 ASSESSMENT — PAIN SCALES - GENERAL: PAINLEVEL: MILD PAIN (2)

## 2024-09-25 NOTE — PROGRESS NOTES
Assessment & Plan   Problem List Items Addressed This Visit    None  Visit Diagnoses       Chronic left sacroiliac pain    -  Primary    Relevant Medications    predniSONE (DELTASONE) 20 MG tablet    Adjustment disorder with mixed anxiety and depressed mood        Relevant Orders    Adult Mental Health  Referral    TSH with free T4 reflex    Attention deficit hyperactivity disorder (ADHD), predominantly hyperactive type               Patient that is new to my clinic.  He was in in August.  We worked up abdominal pain, neck pain.  He is accompanied by his sister who brings him in the medical appointments.  The patient has not worked not great capabilities at this point for many years.  At 1 point he was diagnosed with ADHD and some medications but did not feel well on those.  He has self medicated with drugs throughout the years.  He used to be more successful in managing multiple different things lately has been more scattered and has to be organized by his sister and father.    We reviewed his CT scan and that the hernias are okay.  For abdominal pain he will get a colonoscopy and go on fiber supplement.    For the chronic left sacroiliac organ to try some anti-inflammatory prednisone just for 5 days I hope it does not make his hyperactivity worse.    For anxiety and ADHD we will order neuropsych testing and no where he is adding get a formal diagnosis then may try some treatment but have to be careful with his previous drug use and self-medicating.  Will also refer to psychiatry to have them help with medical management and mental health.  I am going to check a TSH today to make sure he is not hyperthyroid.       The longitudinal plan of care for the diagnosis(es)/condition(s) as documented were addressed during this visit. Due to the added complexity in care, I will continue to support Reilly in the subsequent management and with ongoing continuity of care.      Subjective   Reilly is a 58 year old,  "presenting for the following health issues:  Follow Up (Abdominal pain, back/hip pain, neck pain ) and Knee Pain        9/25/2024     3:50 PM   Additional Questions   Roomed by Alejandra   Accompanied by Sister, Emely     History of Present Illness       Reason for visit:  Follow up to last appt., discuss restless leg syndrome, referral to neurophych evaluation.    He eats 0-1 servings of fruits and vegetables daily.He consumes 6 sweetened beverage(s) daily.He exercises with enough effort to increase his heart rate 9 or less minutes per day.  He exercises with enough effort to increase his heart rate 3 or less days per week.   He is taking medications regularly.     Left lower back pain, left SI area. Used salonspa patch. Been worse this last week, sometimes can't get up off the floor.       Waiting for neuropsych testing to document condition to get more assistance.         Objective    /74 (BP Location: Right arm, Patient Position: Sitting, Cuff Size: Adult Regular)   Pulse 69   Temp 97.9  F (36.6  C) (Temporal)   Resp 16   Ht 1.715 m (5' 7.52\")   Wt 72.3 kg (159 lb 4.8 oz)   SpO2 100%   BMI 24.57 kg/m    Body mass index is 24.57 kg/m .  Physical Exam   No acute distress very active and hyperactive with his speech  Heart is regular  Abdomen is soft  Left sacroiliac area is tender to palpation with some pain on moving the left leg.            Signed Electronically by: Tyrone Rhodes MD    "

## 2024-10-07 NOTE — H&P
[facility] Anesthesia Pre-op History and Physical    Alejandro Alves MRN# [id number]   Age: 58 year old YOB: 1965      Date of Surgery: 10/8/2024 Location [facility]      Date of Exam 10/8/2024 Facility (In hospital)       Home clinic: [facility]  Primary care provider: Tyrone Rhodes         Chief Complaint and/or Reason for Procedure:   No chief complaint on file.  Colonoscopy. Hematochezia  Exam 2016       Active problem list:     Patient Active Problem List    Diagnosis Date Noted    Peritonsillar abscess 05/10/2023     Priority: Medium    Hernia, abdominal 04/15/2019     Priority: Medium    Foreign body of knee, left, initial encounter 03/19/2019     Priority: Medium    Acute foreign body of left knee, initial encounter 03/18/2019     Priority: Medium    Subacromial impingement, right 02/09/2017     Priority: Medium    Superior glenoid labrum lesion of right shoulder, initial encounter 02/09/2017     Priority: Medium    Incomplete tear of right rotator cuff 02/09/2017     Priority: Medium    Restless legs syndrome (RLS) 04/20/2016     Priority: Medium    Hip pain, right 04/20/2016     Priority: Medium    Tobacco use disorder 04/11/2016     Priority: Medium            Medications (include herbals and vitamins):   Any Plavix use in the last 7 days? No     No current facility-administered medications for this encounter.     Current Outpatient Medications   Medication Sig Dispense Refill    predniSONE (DELTASONE) 20 MG tablet Take 1 tablet (20 mg) by mouth daily. 5 tablet 0             Allergies:    No Known Allergies  Allergy to Latex? No  Allergy to tape?   No  Intolerances:             Physical Exam:   All vitals have been reviewed  No data found.  No intake/output data recorded.  Lungs:   No increased work of breathing, good air exchange, clear to auscultation bilaterally, no crackles or wheezing     Cardiovascular:   Normal  apical impulse, regular rate and rhythm, normal S1 and S2, no S3 or S4, and no murmur noted             Lab / Radiology Results:            Anesthetic risk and/or ASA classification:       Simon Mills MD

## 2024-10-08 ENCOUNTER — TELEPHONE (OUTPATIENT)
Dept: GASTROENTEROLOGY | Facility: CLINIC | Age: 59
End: 2024-10-08
Payer: COMMERCIAL

## 2024-10-08 NOTE — TELEPHONE ENCOUNTER
Caller: CSister  Reason for Reschedule/Cancellation (please be detailed, any staff messages or encounters to note?):     Per sister/Emely       Prior to reschedule please review:  Ordering Provider:Tyrone Rhodes MD   Sedation Determined: mod   Does patient have any ASC Exclusions, please identify?: n       Notes on Cancelled Procedure:  Procedure:Lower Endoscopy [Colonoscopy]   Date: 10/08/2024  Location:[facility]; [address] [place] [address] , [place] [address]  Surgeon: long         Rescheduled: no

## 2024-10-09 ENCOUNTER — MYC MEDICAL ADVICE (OUTPATIENT)
Dept: INTERNAL MEDICINE | Facility: CLINIC | Age: 59
End: 2024-10-09
Payer: COMMERCIAL

## 2024-10-09 ENCOUNTER — ORDERS ONLY (AUTO-RELEASED) (OUTPATIENT)
Dept: INTERNAL MEDICINE | Facility: CLINIC | Age: 59
End: 2024-10-09
Payer: COMMERCIAL

## 2024-10-09 DIAGNOSIS — Z12.11 SCREEN FOR COLON CANCER: ICD-10-CM

## 2024-10-09 DIAGNOSIS — Z12.11 SCREEN FOR COLON CANCER: Primary | ICD-10-CM

## 2024-10-11 ENCOUNTER — PATIENT OUTREACH (OUTPATIENT)
Dept: CARE COORDINATION | Facility: CLINIC | Age: 59
End: 2024-10-11
Payer: COMMERCIAL

## 2024-10-11 NOTE — PROGRESS NOTES
Clinic Care Coordination Contact    [employer] message sent to sister to see when they want to do check in phone call.     If no reply will resend in one - two weeks.     Rabia Mayer LAM   [facility] [facility] - Care Coordination  [facility]   [phone]

## 2024-10-22 NOTE — PROGRESS NOTES
Clinic Care Coordination Contact    Will send a second [employer] message to the sister to schedule an appt next week.  If no response will call in about one week.     Rabia Mayer LAM   [place] [facility] - Care Coordination  [facility]   [phone]

## 2024-10-25 ENCOUNTER — MYC MEDICAL ADVICE (OUTPATIENT)
Dept: INTERNAL MEDICINE | Facility: CLINIC | Age: 59
End: 2024-10-25
Payer: COMMERCIAL

## 2024-10-30 ENCOUNTER — PATIENT OUTREACH (OUTPATIENT)
Dept: FAMILY MEDICINE | Facility: CLINIC | Age: 59
End: 2024-10-30
Payer: COMMERCIAL

## 2024-10-30 NOTE — LETTER
It was a pleasure to speak with you.  I would like to provide you with the enclosed information for your records.  As part of care coordination, we are developing care plans to assist in accomplishing your health care goals.  When we speak next, please feel free to let me know if you want to add or change any information on your care plans.    The resources we talked about are:     Disability hub [place]    [phone]  Dis[place] 101 - https://[place].db101.org    Social Security [facility]      [phone]  Social Security  - toll free [place]   [phone]    [facility] [phone] - option 3 for MH CM - option 4 for mnchoices/needs assessment    As always, feel free to contact me if you have any questions or concerns.  I look forward to working with you in the effort to achieve your health care and wellness goals .        Sincerely,      Rabia Mayer, \[facility]\""  Clinic Care Coordination  [phone]    [facility]  Patient Centered Plan of Care  About Me:        Patient Name:  Alejandro Alves    YOB: 1965  Age:         59 year old   [facility] MRN:    [id number] Telephone Information:  Home Phone [phone]   Mobile [phone]       [place]  [address] [facility] [facility] [address] Email address:  [email or web address]      Emergency Contact(s)    Name Relationship Lgl Grd Work Phone Home Phone Mobile Phone   1Joesph RAJPUT Sister    [phone]           Primary language:  English     needed? No   [employer]:  [phone] op. 1  Other communication barriers:Other (per sister may not be accurate or not share and uncertain of cause)    Preferred Method of Communication:  Mail  Current living arrangement: I live alone    Mobility Status/ Medical Equipment: Independent        Health Maintenance  Health Maintenance Reviewed: Due/Overdue   Health Maintenance Due   Topic Date Due    ADVANCE CARE PLANNING  Never done     COVID-19 Vaccine (1) Never done    ZOSTER IMMUNIZATION (1 of 2) Never done    LUNG CANCER SCREENING  Never done    YEARLY PREVENTIVE VISIT  04/20/2017    DTAP/TDAP/TD IMMUNIZATION (1 - Tdap) 11/27/2018    Pneumococcal Vaccine: Pediatrics (0 to 5 Years) and At-Risk Patients (6 to 64 Years) (2 of 2 - PPSV23 or PCV20) 01/21/2019    INFLUENZA VACCINE (1) 09/01/2024           My Access Plan  Medical Emergency 911   Primary Clinic Line [facility] [phone]   24 Hour Appointment Line [phone] or  [address] [place] [address] [phone]) (toll-free)   24 Hour Nurse Line [phone] (toll-free)   Preferred Urgent Care [facility], [phone]     [facility]  [phone]     Preferred Pharmacy [facility] [address] [place] [address] [address]     Behavioral Health Crisis Line The National Suicide Prevention Lifeline at 1-455.706.8015 or Text/Call 258           My Care Team Members  Patient Care Team         Relationship Specialty Notifications Start End    Tyrone Rhodes MD PCP - General   8/8/24     Phone: [phone] Fax: [phone]         [address] [place] [address]    Rabia Mayer LSW Lead Care Coordinator Primary Care - CC Admissions 8/21/24     Phone: [phone] Fax: [phone]        Tyrone Rhodes MD Assigned PCP   8/23/24     Phone: [phone] Fax: [phone]         [address] [place] [address]                My Care Plans  Self Management and Treatment Plan    Care Plan  Care Plan: Mental Health       Problem: Mental Health Symptoms Need Improvement       Long-Range Goal: Improve management of mental health symptoms and establish with mental health/psychosocial supports       Start Date: 8/23/2024 Expected End Date: 8/23/2025    This Visit's Progress: 20% Recent Progress: 10%    Note:     Barriers: unknown concerns  Strengths: supportive family   Patient expressed  understanding of goal: yes  Action steps to achieve this goal:  1. I will complete a neuropsych assessment  2. I will complete a diagnostic assessment with mental health provider and counseling  3. I will work with care coordination to identify and connect with resources as assessments are completed, such as [facility] , [facility] supports, etc                                Action Plans on File:                       Advance Care Plans/Directives:             My Medical and Care Information  Problem List   Patient Active Problem List   Diagnosis    Tobacco use disorder    Restless legs syndrome (RLS)    Hip pain, right    Subacromial impingement, right    Superior glenoid labrum lesion of right shoulder, initial encounter    Incomplete tear of right rotator cuff    Acute foreign body of left knee, initial encounter    Foreign body of knee, left, initial encounter    Peritonsillar abscess    Hernia, abdominal      Current Medications:  Please refer to the most recent medication list provided to you by your medical team and reach out to your provider with any questions or to make any corrections.    Care Coordination Start Date: 8/21/2024   Frequency of Care Coordination: monthly, more frequently as needed     Form Last Updated: 10/30/2024

## 2024-10-30 NOTE — PROGRESS NOTES
Clinic Care Coordination Contact  Follow Up Progress Note      Assessment: sister got him scheduled for his neuropsych assessment on January 8th.  She is concerned about getting him there and discussed options.     Discussed support for pt with finances and [facility] applications. Sister asked about disability options. Sending resources for [employer] and social security.     Care Gaps:    Health Maintenance Due   Topic Date Due    ADVANCE CARE PLANNING  Never done    COVID-19 Vaccine (1) Never done    ZOSTER IMMUNIZATION (1 of 2) Never done    LUNG CANCER SCREENING  Never done    YEARLY PREVENTIVE VISIT  04/20/2017    DTAP/TDAP/TD IMMUNIZATION (1 - Tdap) 11/27/2018    Pneumococcal Vaccine: Pediatrics (0 to 5 Years) and At-Risk Patients (6 to 64 Years) (2 of 2 - PPSV23 or PCV20) 01/21/2019    INFLUENZA VACCINE (1) 09/01/2024       Postponed to future as pt is hard to get to appointments and wants to focus on his neuropsych     Care Plans  Care Plan: Mental Health       Problem: Mental Health Symptoms Need Improvement       Long-Range Goal: Improve management of mental health symptoms and establish with mental health/psychosocial supports       Start Date: 8/23/2024 Expected End Date: 8/23/2025    This Visit's Progress: 20% Recent Progress: 10%    Note:     Barriers: unknown concerns  Strengths: supportive family   Patient expressed understanding of goal: yes  Action steps to achieve this goal:  1. I will complete a neuropsych assessment  2. I will complete a diagnostic assessment with mental health provider and counseling  3. I will work with care coordination to identify and connect with resources as assessments are completed, such as [facility] , [facility] supports, etc                                Intervention/Education provided during outreach: will send resources.  Education about neuropsych and counseling differences.      Outreach Frequency: monthly, more frequently as needed      Plan:   Sister to help pt  get to neuropsych.  Sister to call resources.  Care plan sent.  Care Coordinator will follow up in one month via [employer] to schedule appt.     MESSI Muñoz   [facility] [facility] - Care Coordination  [facility]   [phone]

## 2024-11-01 LAB — NONINV COLON CA DNA+OCC BLD SCRN STL QL: NEGATIVE

## 2024-12-03 ENCOUNTER — PATIENT OUTREACH (OUTPATIENT)
Dept: CARE COORDINATION | Facility: CLINIC | Age: 59
End: 2024-12-03
Payer: COMMERCIAL

## 2024-12-03 NOTE — PROGRESS NOTES
Clinic Care Coordination Contact  [facility]/Voicemail    Clinical Data: Care Coordinator Outreach    Outreach Documentation Number of Outreach Attempt   12/3/2024   1:02 PM 1       Left message on sister's voicemail with call back information and requested return call.      Plan: Care Coordinator will send [employer] message to check on when to touch base next week.  If no reply will resend in one week.    MESSI Muñoz   [place] [facility] - Care Coordination  [facility]   [phone]

## 2024-12-11 ENCOUNTER — PATIENT OUTREACH (OUTPATIENT)
Dept: FAMILY MEDICINE | Facility: CLINIC | Age: 59
End: 2024-12-11
Payer: COMMERCIAL

## 2024-12-11 NOTE — PROGRESS NOTES
Clinic Care Coordination Contact  Follow Up Progress Note      Assessment: Spoke with sister Emely and she has not been able to really have a good contact with pt.  There are systems in place of people she can reach to check in with, who live within sight of his home.      Neuropsych testing is on the 8th of January and psychiatry on the 20th of January.  She is still unsure if she will be able to get him to attend.      Care Gaps:    Health Maintenance Due   Topic Date Due    ADVANCE CARE PLANNING  Never done    COVID-19 Vaccine (1) Never done    ZOSTER IMMUNIZATION (1 of 2) Never done    LUNG CANCER SCREENING  Never done    YEARLY PREVENTIVE VISIT  04/20/2017    DTAP/TDAP/TD IMMUNIZATION (1 - Tdap) 11/27/2018    Pneumococcal Vaccine: Pediatrics (0 to 5 Years) and At-Risk Patients (6 to 64 Years) (2 of 2 - PPSV23 or PCV20) 01/21/2019    INFLUENZA VACCINE (1) 09/01/2024       Postponed to after neuropsych and psychiatry appointments     Care Plans  Care Plan: Mental Health       Problem: Mental Health Symptoms Need Improvement       Long-Range Goal: Improve management of mental health symptoms and establish with mental health/psychosocial supports       Start Date: 8/23/2024 Expected End Date: 8/23/2025    This Visit's Progress: 30% Recent Progress: 20%    Note:     Barriers: unknown concerns  Strengths: supportive family   Patient expressed understanding of goal: yes  Action steps to achieve this goal:  1. I will complete a neuropsych assessment  2. I will complete a diagnostic assessment with mental health provider and counseling  3. I will work with care coordination to identify and connect with resources as assessments are completed, such as [facility] , Critical access hospital supports, etc                                Intervention/Education provided during outreach: encouraged asking clarification questions when he make comments. Encouraged writing down what she sees in his behavior and review with Reilly alcantar  clarification and addition.  This will help get some additional information about what is going on.  Working on a good plan to how to get him in an environment where it is more likely he will attend.      Outreach Frequency: monthly, more frequently as needed      Plan: sister to bring pt to neuropsych assessment.   Care Coordinator will follow up in one month.     MESSI Muñoz   [facility] [facility] - Care Coordination  [facility]   [phone]

## 2025-01-08 ENCOUNTER — TRANSFERRED RECORDS (OUTPATIENT)
Dept: HEALTH INFORMATION MANAGEMENT | Facility: CLINIC | Age: 60
End: 2025-01-08
Payer: COMMERCIAL

## 2025-01-09 ENCOUNTER — PATIENT OUTREACH (OUTPATIENT)
Dept: CARE COORDINATION | Facility: CLINIC | Age: 60
End: 2025-01-09
Payer: COMMERCIAL

## 2025-01-09 NOTE — PROGRESS NOTES
Clinic Care Coordination Contact  Care Team Conversations    [employer] message sent to sister to schedule next phone call.     If no reply in one week will send a second request.     Rabia Mayer LAM   [facility] [facility] - Care Coordination  [facility]   [phone]

## 2025-01-12 ENCOUNTER — MYC MEDICAL ADVICE (OUTPATIENT)
Dept: INTERNAL MEDICINE | Facility: CLINIC | Age: 60
End: 2025-01-12
Payer: COMMERCIAL

## 2025-01-12 DIAGNOSIS — R41.89: ICD-10-CM

## 2025-01-12 DIAGNOSIS — F90.1 ATTENTION DEFICIT HYPERACTIVITY DISORDER (ADHD), PREDOMINANTLY HYPERACTIVE TYPE: Primary | ICD-10-CM

## 2025-01-22 ENCOUNTER — PATIENT OUTREACH (OUTPATIENT)
Dept: FAMILY MEDICINE | Facility: CLINIC | Age: 60
End: 2025-01-22
Payer: COMMERCIAL

## 2025-01-22 NOTE — PROGRESS NOTES
Clinic Care Coordination Contact  Follow Up Progress Note      Assessment: neuropsych assessment is complete and sister noted high ADHD, mild neuro cognitive disorder, unspecified Anxiety and unspecified depression. She noted the IQ indicates as high.  The recommendation is to get an MRI of his brain to look for any damge from drug use. They have psychiatry scheduled March, 4th 2025 and on wait list for earlier appt.     Care Gaps:    Health Maintenance Due   Topic Date Due    ADVANCE CARE PLANNING  Never done    COVID-19 Vaccine (1) Never done    ZOSTER IMMUNIZATION (1 of 2) Never done    LUNG CANCER SCREENING  Never done    YEARLY PREVENTIVE VISIT  04/20/2017    DTAP/TDAP/TD IMMUNIZATION (1 - Tdap) 11/27/2018    Pneumococcal Vaccine: 50+ Years (2 of 2 - PPSV23) 01/21/2019    INFLUENZA VACCINE (1) 09/01/2024    PHQ-2 (once per calendar year)  01/01/2025       Postponed to future office visits     Care Plans  Care Plan: Mental Health       Problem: Mental Health Symptoms Need Improvement       Long-Range Goal: Improve management of mental health symptoms and establish with mental health/psychosocial supports       Start Date: 8/23/2024 Expected End Date: 8/23/2025    This Visit's Progress: 40% Recent Progress: 30%    Note:     Barriers: unknown concerns  Strengths: supportive family   Patient expressed understanding of goal: yes  Action steps to achieve this goal:  1. I will complete a neuropsych assessment (completed 1/25)  2. I will complete a diagnostic assessment with mental health provider and counseling  3. I will work with care coordination to identify and connect with resources as assessments are completed, such as [facility] , [facility] supports, etc                                Intervention/Education provided during outreach: discussed the neuropsych report and approach going forward. Discussed the benefits of following through of the recommendations.     Discussed benefit of yearly preventive  visit     Outreach Frequency: monthly, more frequently as needed      Plan:   Pt attend MRI and sister continue to support pt with appointments and planning for MH needs.   Care Coordinator will follow up in one month.  Will send mychart in about 3 weeks for best outreach time/schedule.    Rabia Mayer, MESSI   [place] [facility] - Care Coordination  [facility]   [phone]

## 2025-02-13 ENCOUNTER — PATIENT OUTREACH (OUTPATIENT)
Dept: CARE COORDINATION | Facility: CLINIC | Age: 60
End: 2025-02-13
Payer: COMMERCIAL

## 2025-02-13 NOTE — PROGRESS NOTES
Clinic Care Coordination Contact    Will send [employer] message to pt/sister for scheduling next visit. If no reply in a week will resend [employer].     MESSI Muñoz   [place] [facility] - Care Coordination  [facility]   [phone]

## 2025-02-19 ENCOUNTER — OFFICE VISIT (OUTPATIENT)
Dept: INTERNAL MEDICINE | Facility: CLINIC | Age: 60
End: 2025-02-19
Payer: COMMERCIAL

## 2025-02-19 VITALS
OXYGEN SATURATION: 95 % | HEIGHT: 68 IN | TEMPERATURE: 97.5 F | DIASTOLIC BLOOD PRESSURE: 85 MMHG | SYSTOLIC BLOOD PRESSURE: 132 MMHG | WEIGHT: 182.8 LBS | HEART RATE: 82 BPM | RESPIRATION RATE: 16 BRPM | BODY MASS INDEX: 27.71 KG/M2

## 2025-02-19 DIAGNOSIS — F90.1 ATTENTION DEFICIT HYPERACTIVITY DISORDER (ADHD), PREDOMINANTLY HYPERACTIVE TYPE: ICD-10-CM

## 2025-02-19 DIAGNOSIS — R41.3 MEMORY LOSS: ICD-10-CM

## 2025-02-19 DIAGNOSIS — F19.10 DRUG ABUSE (H): ICD-10-CM

## 2025-02-19 DIAGNOSIS — R41.89: ICD-10-CM

## 2025-02-19 DIAGNOSIS — F17.200 TOBACCO USE DISORDER: ICD-10-CM

## 2025-02-19 DIAGNOSIS — Z00.00 ENCOUNTER FOR ROUTINE ADULT HEALTH EXAMINATION WITHOUT ABNORMAL FINDINGS: Primary | ICD-10-CM

## 2025-02-19 PROCEDURE — 99396 PREV VISIT EST AGE 40-64: CPT | Performed by: INTERNAL MEDICINE

## 2025-02-19 SDOH — HEALTH STABILITY: PHYSICAL HEALTH: ON AVERAGE, HOW MANY DAYS PER WEEK DO YOU ENGAGE IN MODERATE TO STRENUOUS EXERCISE (LIKE A BRISK WALK)?: 0 DAYS

## 2025-02-19 ASSESSMENT — ANXIETY QUESTIONNAIRES
GAD7 TOTAL SCORE: 20
4. TROUBLE RELAXING: NEARLY EVERY DAY
8. IF YOU CHECKED OFF ANY PROBLEMS, HOW DIFFICULT HAVE THESE MADE IT FOR YOU TO DO YOUR WORK, TAKE CARE OF THINGS AT HOME, OR GET ALONG WITH OTHER PEOPLE?: EXTREMELY DIFFICULT
1. FEELING NERVOUS, ANXIOUS, OR ON EDGE: NEARLY EVERY DAY
3. WORRYING TOO MUCH ABOUT DIFFERENT THINGS: NEARLY EVERY DAY
2. NOT BEING ABLE TO STOP OR CONTROL WORRYING: NEARLY EVERY DAY
7. FEELING AFRAID AS IF SOMETHING AWFUL MIGHT HAPPEN: MORE THAN HALF THE DAYS
7. FEELING AFRAID AS IF SOMETHING AWFUL MIGHT HAPPEN: MORE THAN HALF THE DAYS
5. BEING SO RESTLESS THAT IT IS HARD TO SIT STILL: NEARLY EVERY DAY
GAD7 TOTAL SCORE: 20
IF YOU CHECKED OFF ANY PROBLEMS ON THIS QUESTIONNAIRE, HOW DIFFICULT HAVE THESE PROBLEMS MADE IT FOR YOU TO DO YOUR WORK, TAKE CARE OF THINGS AT HOME, OR GET ALONG WITH OTHER PEOPLE: EXTREMELY DIFFICULT
6. BECOMING EASILY ANNOYED OR IRRITABLE: NEARLY EVERY DAY
GAD7 TOTAL SCORE: 20

## 2025-02-19 ASSESSMENT — PATIENT HEALTH QUESTIONNAIRE - PHQ9
SUM OF ALL RESPONSES TO PHQ QUESTIONS 1-9: 20
SUM OF ALL RESPONSES TO PHQ QUESTIONS 1-9: 20
10. IF YOU CHECKED OFF ANY PROBLEMS, HOW DIFFICULT HAVE THESE PROBLEMS MADE IT FOR YOU TO DO YOUR WORK, TAKE CARE OF THINGS AT HOME, OR GET ALONG WITH OTHER PEOPLE: VERY DIFFICULT

## 2025-02-19 ASSESSMENT — PAIN SCALES - GENERAL: PAINLEVEL_OUTOF10: NO PAIN (0)

## 2025-02-19 ASSESSMENT — SOCIAL DETERMINANTS OF HEALTH (SDOH): HOW OFTEN DO YOU GET TOGETHER WITH FRIENDS OR RELATIVES?: NEVER

## 2025-02-19 NOTE — PROGRESS NOTES
"Preventive Care Visit  [facility]  Tyrone Rhodes MD, Internal Medicine  Feb 19, 2025      Assessment & Plan   Problem List Items Addressed This Visit       Tobacco use disorder    Relevant Orders    MR Brain w/o & w Contrast     Other Visit Diagnoses       Encounter for routine adult health examination without abnormal findings    -  Primary    Attention deficit hyperactivity disorder (ADHD), predominantly hyperactive type        Relevant Orders    MR Brain w/o & w Contrast    Low average cognitive ability        Relevant Orders    MR Brain w/o & w Contrast    Memory loss        Relevant Orders    MR Brain w/o & w Contrast    Drug abuse (H)               Patient with past drug abuse, cocaine then more recently methamphetamines. Usually abuses stimulants trying to treat his ADHD, used to be fucntioning and working, now does nothing, can't focus. Cognitive concerns so saw neuropsych and certainly has ADHD but other issues, possible brain damage from drug abuse therefore ordering a brain MRI.     Mental health and anxiety, needs to get treatment will refer to psychiatry.  Coming up on March 4th, hopefully brain MRI before then.      Labs were all good in August,            BMI  Estimated body mass index is 27.87 kg/m  as calculated from the following:    Height as of this encounter: 1.725 m (5' 7.91\").    Weight as of this encounter: 82.9 kg (182 lb 12.8 oz).       Depression Screening Follow Up        2/19/2025     3:33 PM   PHQ   PHQ-9 Total Score 20    Q9: Thoughts of better off dead/self-harm past 2 weeks Not at all       Patient-reported           Follow Up Actions Taken       Counseling  Appropriate preventive services were addressed with this patient via screening, questionnaire, or discussion as appropriate for fall prevention, nutrition, physical activity, Tobacco-use cessation, social engagement, weight loss and cognition.  Checklist reviewing preventive services available " has been given to the patient.  Reviewed patient's diet, addressing concerns and/or questions.   Patient is at risk for social isolation and has been provided with information about the benefit of social connection.   The patient was instructed to see the dentist every 6 months.   He is at risk for psychosocial distress and has been provided with information to reduce risk.   The patient's PHQ-9 score is consistent with severe depression. He was provided with information regarding depression.       FUTURE APPOINTMENTS:       - Follow-up for annual visit or as needed      Subjective   Reilly is a 59 year old, presenting for the following:  Physical        2/19/2025     3:52 PM   Additional Questions   Roomed by Alejandra   Accompanied by Sister, Emely          HPI      Here with his sister.     Cognitive issues saw neuropsych and has ADHD and anxiety. Psychiatry evaluation.       Neuropsych wanted to check MRI for cognitive issues and possible infarct or damage. Wasn't approve earlier.     Previous stimulant abuse and could affect his brain.     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      Health Care Directive  Patient does not have a Health Care Directive: Discussed advance care planning with patient; however, patient declined at this time.      2/19/2025   General Health   How would you rate your overall physical health? (!) FAIR   Feel stress (tense, anxious, or unable to sleep) Very much   (!) STRESS CONCERN      2/19/2025   Nutrition   Three or more servings of calcium each day? Yes   Diet: Regular (no restrictions)   How many servings of fruit and vegetables per day? (!) 0-1   How many sweetened beverages each day? (!) 4+         2/19/2025   Exercise   Days per week of moderate/strenous exercise 0 days   (!) EXERCISE CONCERN      2/19/2025   Social Factors   Frequency of gathering with friends or relatives Never   Worry food won't last until get money to buy more No   Food  not last or not have enough money for food? No   Do you have housing? (Housing is defined as stable permanent housing and does not include staying ouside in a car, in a tent, in an abandoned building, in an overnight shelter, or couch-surfing.) Yes   Are you worried about losing your housing? Yes   Lack of transportation? No   Unable to get utilities (heat,electricity)? No   Want help with housing or utility concern? No   (!) HOUSING CONCERN PRESENT(!) SOCIAL CONNECTIONS CONCERN      2/19/2025   Fall Risk   Fallen 2 or more times in the past year? Yes   Trouble with walking or balance? Yes   Gait Speed Test (Document in seconds) 3.16   Gait Speed Test Interpretation Less than or equal to 5.00 seconds - PASS          2/19/2025   Dental   Dentist two times every year? (!) NO          Today's PHQ-9 Score:       2/19/2025     3:33 PM   PHQ-9 SCORE   PHQ-9 Total Score MyChart 20 (Severe depression)   PHQ-9 Total Score 20        Patient-reported         2/19/2025   Substance Use   Alcohol more than 3/day or more than 7/wk Not Applicable   Do you use any other substances recreationally? (!) OTHER     Social History     Tobacco Use    Smoking status: Every Day     Current packs/day: 1.00     Average packs/day: 1 pack/day for 30.3 years (30.3 ttl pk-yrs)     Types: Cigarettes     Start date: 10/19/1994    Smokeless tobacco: Never    Tobacco comments:     patient states he would like to    Substance Use Topics    Alcohol use: No    Drug use: Not Currently     Types: Cocaine, Methamphetamines     Comment: minimal           2/19/2025   STI Screening   New sexual partner(s) since last STI/HIV test? No   Last PSA:   PSA   Date Value Ref Range Status   04/20/2016 1.21 0 - 4 ug/L Final     ASCVD Risk   The 10-year ASCVD risk score (Zakiya MCCAIN, et al., 2019) is: 10.6%    Values used to calculate the score:      Age: 59 years      Sex: Male      Is Non- : No      Diabetic: No      Tobacco smoker: Yes    "   Systolic Blood Pressure: 132 mmHg      Is BP treated: No      HDL Cholesterol: 66 mg/dL      Total Cholesterol: 176 mg/dL           Reviewed and updated as needed this visit by Provider                    Labs reviewed in EPIC      Review of Systems  CONSTITUTIONAL: NEGATIVE for fever, chills, change in weight  INTEGUMENTARY/SKIN: NEGATIVE for worrisome rashes, moles or lesions  EYES: NEGATIVE for vision changes or irritation  ENT/MOUTH: NEGATIVE for ear, mouth and throat problems  RESP: NEGATIVE for significant cough or SOB  BREAST: NEGATIVE for masses, tenderness or discharge  CV: NEGATIVE for chest pain, palpitations or peripheral edema  GI: NEGATIVE for nausea, abdominal pain, heartburn, or change in bowel habits  : NEGATIVE for frequency, dysuria, or hematuria  MUSCULOSKELETAL: NEGATIVE for significant arthralgias or myalgia  NEURO: NEGATIVE for weakness, dizziness or paresthesias  ENDOCRINE: NEGATIVE for temperature intolerance, skin/hair changes  HEME: NEGATIVE for bleeding problems  PSYCHIATRIC: anxious,      Objective    Exam  /85 (BP Location: Right arm, Patient Position: Sitting, Cuff Size: Adult Regular)   Pulse 82   Temp 97.5  F (36.4  C) (Temporal)   Resp 16   Ht 1.725 m (5' 7.91\")   Wt 82.9 kg (182 lb 12.8 oz)   SpO2 95%   BMI 27.87 kg/m     Estimated body mass index is 27.87 kg/m  as calculated from the following:    Height as of this encounter: 1.725 m (5' 7.91\").    Weight as of this encounter: 82.9 kg (182 lb 12.8 oz).    Physical Exam  GENERAL: alert and no distress  EYES: Eyes grossly normal to inspection, PERRL and conjunctivae and sclerae normal  HENT: ear canals and TM's normal, nose and mouth without ulcers or lesions  NECK: no adenopathy, no asymmetry, masses, or scars  RESP: lungs clear to auscultation - no rales, rhonchi or wheezes  CV: regular rate and rhythm, normal S1 S2, no S3 or S4, no murmur, click or rub, no peripheral edema  ABDOMEN: soft, nontender, no " hepatosplenomegaly, no masses and bowel sounds normal  MS: no gross musculoskeletal defects noted, no edema  SKIN: no suspicious lesions or rashes  NEURO: Normal strength and tone, mentation intact and speech normal  PSYCH: affect normal/bright and anxious  Prior to immunization administration, verified patients identity using patient s name and date of birth. Please see Immunization Activity for additional information.     Screening Questionnaire for Adult Immunization    Are you sick today?   No   Do you have allergies to medications, food, a vaccine component or latex?   No   Have you ever had a serious reaction after receiving a vaccination?   No   Do you have a long-term health problem with heart, lung, kidney, or metabolic disease (e.g., diabetes), asthma, a blood disorder, no spleen, complement component deficiency, a cochlear implant, or a spinal fluid leak?  Are you on long-term aspirin therapy?   No   Do you have cancer, leukemia, HIV/AIDS, or any other immune system problem?   No   Do you have a parent, brother, or sister with an immune system problem?   Cancer    In the past 3 months, have you taken medications that affect  your immune system, such as prednisone, other steroids, or anticancer drugs; drugs for the treatment of rheumatoid arthritis, Crohn s disease, or psoriasis; or have you had radiation treatments?   No   Have you had a seizure, or a brain or other nervous system problem?   No   During the past year, have you received a transfusion of blood or blood    products, or been given immune (gamma) globulin or antiviral drug?   No   For women: Are you pregnant or is there a chance you could become       pregnant during the next month?   No   Have you received any vaccinations in the past 4 weeks?   No     Immunization questionnaire was positive for at least one answer.  Notified Provider.      Patient instructed to remain in clinic for 15 minutes afterwards, and to report any adverse reactions.      Screening performed by Alejandra Bhakta on 2/19/2025 at 3:59 PM.         Signed Electronically by: Tyrone Rhodes MD    Answers submitted by the patient for this visit:  Patient Health Questionnaire (Submitted on 2/19/2025)  If you checked off any problems, how difficult have these problems made it for you to do your work, take care of things at home, or get along with other people?: Very difficult  PHQ9 TOTAL SCORE: 20  Patient Health Questionnaire (G7) (Submitted on 2/19/2025)  NIMO 7 TOTAL SCORE: 20

## 2025-02-19 NOTE — PATIENT INSTRUCTIONS
Go to bed at 11 PM and up at 7 AM    Take Metamucil each night.    Three meals a day, breakfast, lunch and supper.      Shingles and TDAP, tetanus diptheria and pertussis at the pharmacy.

## 2025-02-25 ENCOUNTER — PATIENT OUTREACH (OUTPATIENT)
Dept: FAMILY MEDICINE | Facility: CLINIC | Age: 60
End: 2025-02-25
Payer: COMMERCIAL

## 2025-02-25 NOTE — LETTER
It was a pleasure to speak with you.  I would like to provide you with the enclosed information for your records.  As part of care coordination, we are developing care plans to assist in accomplishing your health care goals.  When we speak next, please feel free to let me know if you want to add or change any information on your care plans.    As always, feel free to contact me if you have any questions or concerns.  I look forward to working with you in the effort to achieve your health care and wellness goals .        Sincerely,      Rabia Mayer, [facility]  Clinic Care Coordination  [phone]    [facility]  Patient Centered Plan of Care  About Me:        Patient Name:  Alejandro Alves    YOB: 1965  Age:         59 year old   [facility] MRN:    [id number] Telephone Information:  Home Phone [phone]   Mobile [phone]       Address:  [address] [place] [id number] Email address:  [email or web address]      Emergency Contact(s)    Name Relationship Lgl Grd Work Phone Home Phone Mobile Phone   1. ANIBAL RAJPUT Sister    [phone]           Primary language:  English     needed? No   [facility] [employer] [facility]:  [phone] op. 1  Other communication barriers:Other (per sister may not be accurate or not share and uncertain of cause)    Preferred Method of Communication:  Mail  Current living arrangement: I live alone    Mobility Status/ Medical Equipment: Independent        Health Maintenance  Health Maintenance Reviewed: Due/Overdue   Health Maintenance Due   Topic Date Due    ZOSTER IMMUNIZATION (1 of 2) Never done    LUNG CANCER SCREENING  Never done    DTAP/TDAP/TD IMMUNIZATION (1 - Tdap) 11/27/2018    Pneumococcal Vaccine: 50+ Years (2 of 2 - PPSV23) 01/21/2019    INFLUENZA VACCINE (1) 09/01/2024    COVID-19 Vaccine (1 - 2024-25 season) Never done           My Access Plan  Medical Emergency 911   Primary Clinic Line [facility]  [place] - [phone]   24 Hour Appointment Line [phone] or  [phone]) (toll-free)   24 Hour Nurse Line [phone] (toll-free)   Preferred Urgent Care [facility], [phone]     Preferred Hospital [facility]  [phone]     Preferred Pharmacy [facility] [address] [place] [address] [address]     Behavioral Health Crisis Line The National Suicide Prevention Lifeline at 1-332.371.3632 or Text/Call 268           My Care Team Members  Patient Care Team         Relationship Specialty Notifications Start End    Tyrone Rhodes MD PCP - General   8/8/24     Phone: [phone] Fax: [phone]         [address] [facility] [address]    Rabia Mayer LSW Lead Care Coordinator Primary Care - [facility] Admissions 8/21/24     Phone: [phone] Fax: [phone]        Tyrone Rhodes MD Assigned PCP   8/23/24     Phone: [phone] Fax: [phone]         [address] [facility] [address]                My Care Plans  Self Management and Treatment Plan    Care Plan  Care Plan: Mental Health       Problem: Mental Health Symptoms Need Improvement       Long-Range Goal: Improve management of mental health symptoms and establish with mental health/psychosocial supports       Start Date: 8/23/2024 Expected End Date: 8/23/2025    This Visit's Progress: 40% Recent Progress: 40%    Note:     Barriers: unknown concerns  Strengths: supportive family   Patient expressed understanding of goal: yes  Action steps to achieve this goal:  1. I will complete a neuropsych assessment (completed 1/25)  2. I will complete a diagnostic assessment with mental health provider and counseling  3. I will work with care coordination to identify and connect with resources as assessments are completed, such as [facility] , [facility] supports, etc                                Action Plans on File:                       Advance Care  Plans/Directives:             My Medical and Care Information  Problem List   Patient Active Problem List   Diagnosis    Tobacco use disorder    Restless legs syndrome (RLS)    Hip pain, right    Subacromial impingement, right    Superior glenoid labrum lesion of right shoulder, initial encounter    Incomplete tear of right rotator cuff    Acute foreign body of left knee, initial encounter    Foreign body of knee, left, initial encounter    Peritonsillar abscess    Hernia, abdominal      Current Medications:  Please refer to the most recent medication list provided to you by your medical team and reach out to your provider with any questions or to make any corrections.    Care Coordination Start Date: 8/21/2024   Frequency of Care Coordination: monthly, more frequently as needed     Form Last Updated: 02/25/2025

## 2025-02-25 NOTE — PROGRESS NOTES
Clinic Care Coordination Contact  Follow Up Progress Note      Assessment: per sister they have the MRI and psychiatry scheduled.  She continues to be a strong advocate for Reilly and plans to be at the virtual psychiatry appt to make sure they come up with a plan for treating the ADHD and that the focus is not on his depression only.  She feels the ADHD needs to be addressed first. She is aware of the need for some kind of med machine/set up for managing any stimulant medication and in agreement with this need.     Reviewed Neuropsych recommendations and Emely is helping work on scheduling routine activities, reducing distractions and tracking on a calendar.  She is collecting information for the [facility]  and will call after psychiatry appt completed to ensure there is enough information available. Discussed counseling and Emely feels this needs to happen yet not right now. She does not feel it would be beneficial without some kind of control on the ADHD.    Care Gaps:    Health Maintenance Due   Topic Date Due    ZOSTER IMMUNIZATION (1 of 2) Never done    LUNG CANCER SCREENING  Never done    DTAP/TDAP/TD IMMUNIZATION (1 - Tdap) 11/27/2018    Pneumococcal Vaccine: 50+ Years (2 of 2 - PPSV23) 01/21/2019    INFLUENZA VACCINE (1) 09/01/2024    COVID-19 Vaccine (1 - 2024-25 season) Never done       Just had yearly visit and pt was to get immunizations at pharmacy per his desire to complete    Care Plans  Care Plan: Mental Health       Problem: Mental Health Symptoms Need Improvement       Long-Range Goal: Improve management of mental health symptoms and establish with mental health/psychosocial supports       Start Date: 8/23/2024 Expected End Date: 8/23/2025    This Visit's Progress: 40% Recent Progress: 40%    Note:     Barriers: unknown concerns  Strengths: supportive family   Patient expressed understanding of goal: yes  Action steps to achieve this goal:  1. I will complete a neuropsych assessment  (completed 1/25)  2. I will complete a diagnostic assessment with mental health provider and counseling  3. I will work with care coordination to identify and connect with resources as assessments are completed, such as [facility] , [facility] supports, etc                                Intervention/Education provided during outreach: listened and reinforced Emely's plans and focus on priority of managing ADHD better before adding additional supports such as counseling.  Reinforced contacting the county earlier rather then later once she has all the needed documentation (she had attempted before and he did not qualify). Reviewed how the neuropsych and psychiatry assessments will help with social security      Outreach Frequency: monthly, more frequently as needed      Plan:   Pt to attend MRI and psychiatry appt for better understanding of any brain damage and ADHD treatment.   Care Coordinator will follow up in three weeks with a [employer] message for scheduling next call.  Will send care plan via [employer].     MESSI Muñoz   [facility] - Care Coordination  [facility]   [phone]

## 2025-03-03 ENCOUNTER — MYC MEDICAL ADVICE (OUTPATIENT)
Dept: INTERNAL MEDICINE | Facility: CLINIC | Age: 60
End: 2025-03-03
Payer: COMMERCIAL

## 2025-03-04 ENCOUNTER — HOSPITAL ENCOUNTER (EMERGENCY)
Facility: CLINIC | Age: 60
Discharge: OTHER INSTITUTION | End: 2025-03-04
Attending: PHYSICIAN ASSISTANT | Admitting: PHYSICIAN ASSISTANT
Payer: COMMERCIAL

## 2025-03-04 ENCOUNTER — HOSPITAL ENCOUNTER (OUTPATIENT)
Dept: MRI IMAGING | Facility: CLINIC | Age: 60
Discharge: HOME | End: 2025-03-04
Attending: INTERNAL MEDICINE
Payer: COMMERCIAL

## 2025-03-04 ENCOUNTER — APPOINTMENT (OUTPATIENT)
Dept: GENERAL RADIOLOGY | Facility: CLINIC | Age: 60
End: 2025-03-04
Attending: PHYSICIAN ASSISTANT
Payer: COMMERCIAL

## 2025-03-04 VITALS
BODY MASS INDEX: 27.74 KG/M2 | RESPIRATION RATE: 16 BRPM | WEIGHT: 182 LBS | SYSTOLIC BLOOD PRESSURE: 115 MMHG | OXYGEN SATURATION: 99 % | DIASTOLIC BLOOD PRESSURE: 97 MMHG | TEMPERATURE: 98.6 F | HEART RATE: 118 BPM

## 2025-03-04 DIAGNOSIS — R41.89: ICD-10-CM

## 2025-03-04 DIAGNOSIS — R41.3 MEMORY LOSS: ICD-10-CM

## 2025-03-04 DIAGNOSIS — F90.1 ATTENTION DEFICIT HYPERACTIVITY DISORDER (ADHD), PREDOMINANTLY HYPERACTIVE TYPE: ICD-10-CM

## 2025-03-04 DIAGNOSIS — S60.551A FOREIGN BODY OF RIGHT HAND, INITIAL ENCOUNTER: ICD-10-CM

## 2025-03-04 DIAGNOSIS — F17.200 TOBACCO USE DISORDER: ICD-10-CM

## 2025-03-04 DIAGNOSIS — L03.113 CELLULITIS OF RIGHT HAND: ICD-10-CM

## 2025-03-04 PROCEDURE — 96365 THER/PROPH/DIAG IV INF INIT: CPT | Performed by: PHYSICIAN ASSISTANT

## 2025-03-04 PROCEDURE — 73130 X-RAY EXAM OF HAND: CPT | Mod: RT

## 2025-03-04 PROCEDURE — A9585 GADOBUTROL INJECTION: HCPCS | Performed by: INTERNAL MEDICINE

## 2025-03-04 PROCEDURE — 99285 EMERGENCY DEPT VISIT HI MDM: CPT | Mod: FS | Performed by: PHYSICIAN ASSISTANT

## 2025-03-04 PROCEDURE — 255N000002 HC RX 255 OP 636: Performed by: INTERNAL MEDICINE

## 2025-03-04 PROCEDURE — 250N000011 HC RX IP 250 OP 636: Performed by: PHYSICIAN ASSISTANT

## 2025-03-04 PROCEDURE — 99285 EMERGENCY DEPT VISIT HI MDM: CPT | Mod: 25 | Performed by: PHYSICIAN ASSISTANT

## 2025-03-04 PROCEDURE — 90715 TDAP VACCINE 7 YRS/> IM: CPT | Performed by: PHYSICIAN ASSISTANT

## 2025-03-04 PROCEDURE — 70553 MRI BRAIN STEM W/O & W/DYE: CPT

## 2025-03-04 PROCEDURE — 96375 TX/PRO/DX INJ NEW DRUG ADDON: CPT | Performed by: PHYSICIAN ASSISTANT

## 2025-03-04 PROCEDURE — 90471 IMMUNIZATION ADMIN: CPT | Performed by: PHYSICIAN ASSISTANT

## 2025-03-04 RX ORDER — AMPICILLIN AND SULBACTAM 2; 1 G/1; G/1
3 INJECTION, POWDER, FOR SOLUTION INTRAMUSCULAR; INTRAVENOUS EVERY 6 HOURS
Status: DISCONTINUED | OUTPATIENT
Start: 2025-03-04 | End: 2025-03-04 | Stop reason: HOSPADM

## 2025-03-04 RX ORDER — KETOROLAC TROMETHAMINE 15 MG/ML
15 INJECTION, SOLUTION INTRAMUSCULAR; INTRAVENOUS ONCE
Status: COMPLETED | OUTPATIENT
Start: 2025-03-04 | End: 2025-03-04

## 2025-03-04 RX ORDER — GADOBUTROL 604.72 MG/ML
8.5 INJECTION INTRAVENOUS ONCE
Status: COMPLETED | OUTPATIENT
Start: 2025-03-04 | End: 2025-03-04

## 2025-03-04 RX ADMIN — GADOBUTROL 8.5 ML: 604.72 INJECTION INTRAVENOUS at 12:45

## 2025-03-04 RX ADMIN — AMPICILLIN SODIUM AND SULBACTAM SODIUM 3 G: 2; 1 INJECTION, POWDER, FOR SOLUTION INTRAMUSCULAR; INTRAVENOUS at 13:55

## 2025-03-04 RX ADMIN — CLOSTRIDIUM TETANI TOXOID ANTIGEN (FORMALDEHYDE INACTIVATED), CORYNEBACTERIUM DIPHTHERIAE TOXOID ANTIGEN (FORMALDEHYDE INACTIVATED), BORDETELLA PERTUSSIS TOXOID ANTIGEN (GLUTARALDEHYDE INACTIVATED), BORDETELLA PERTUSSIS FILAMENTOUS HEMAGGLUTININ ANTIGEN (FORMALDEHYDE INACTIVATED), BORDETELLA PERTUSSIS PERTACTIN ANTIGEN, AND BORDETELLA PERTUSSIS FIMBRIAE 2/3 ANTIGEN 0.5 ML: 5; 2; 2.5; 5; 3; 5 INJECTION, SUSPENSION INTRAMUSCULAR at 13:56

## 2025-03-04 RX ADMIN — KETOROLAC TROMETHAMINE 15 MG: 15 INJECTION, SOLUTION INTRAMUSCULAR; INTRAVENOUS at 13:53

## 2025-03-04 ASSESSMENT — ACTIVITIES OF DAILY LIVING (ADL)
ADLS_ACUITY_SCORE: 55
ADLS_ACUITY_SCORE: 55

## 2025-03-04 ASSESSMENT — COLUMBIA-SUICIDE SEVERITY RATING SCALE - C-SSRS
6. HAVE YOU EVER DONE ANYTHING, STARTED TO DO ANYTHING, OR PREPARED TO DO ANYTHING TO END YOUR LIFE?: NO
2. HAVE YOU ACTUALLY HAD ANY THOUGHTS OF KILLING YOURSELF IN THE PAST MONTH?: NO
1. IN THE PAST MONTH, HAVE YOU WISHED YOU WERE DEAD OR WISHED YOU COULD GO TO SLEEP AND NOT WAKE UP?: NO

## 2025-03-04 NOTE — ED TRIAGE NOTES
Pt presents with piece of wood into right thumb area between thumb and index finger. Pt has picture of wood.      Triage Assessment (Adult)       Row Name 03/04/25 1319          Triage Assessment    Airway WDL WDL        Respiratory WDL    Respiratory WDL WDL        Skin Circulation/Temperature WDL    Skin Circulation/Temperature WDL WDL        Cardiac WDL    Cardiac WDL WDL        Peripheral/Neurovascular WDL    Peripheral Neurovascular WDL WDL        Cognitive/Neuro/Behavioral WDL    Cognitive/Neuro/Behavioral WDL WDL

## 2025-03-04 NOTE — ED PROVIDER NOTES
"  History     Chief Complaint   Patient presents with    Foreign Body in Skin       HPI  Alejandro Alves is a 59 year old male who presents to the emergency department for a foreign body in his right hand. The patient is here with his sister who assists with history.  Last night patient was using a shovel and sustained a splinter in the webspace between his first and second digits of the right hand.  He did not tell his sister about it until this morning and she subsequently brought him here.  He is able to measure the piece of wood embedded based on the length of piece of wood missing on the shovel.  He was able to get an inch of the wood broken off but there is still about 2 inches in the hand.  Since then he has had increased swelling and pain and reports a \"buzzing\" in his first and second digits.  He has not had a fever.  He figured he would wait till his MRI appointment this morning to have the hand looked out which is why he did not come in yesterday.        Allergies:  No Known Allergies    Problem List:    Patient Active Problem List    Diagnosis Date Noted    Peritonsillar abscess 05/10/2023     Priority: Medium    Hernia, abdominal 04/15/2019     Priority: Medium    Foreign body of knee, left, initial encounter 03/19/2019     Priority: Medium    Acute foreign body of left knee, initial encounter 03/18/2019     Priority: Medium    Subacromial impingement, right 02/09/2017     Priority: Medium    Superior glenoid labrum lesion of right shoulder, initial encounter 02/09/2017     Priority: Medium    Incomplete tear of right rotator cuff 02/09/2017     Priority: Medium    Restless legs syndrome (RLS) 04/20/2016     Priority: Medium    Hip pain, right 04/20/2016     Priority: Medium    Tobacco use disorder 04/11/2016     Priority: Medium        Past Medical History:    Past Medical History:   Diagnosis Date    Tobacco use disorder        Past Surgical History:    Past Surgical History:   Procedure " Laterality Date    COLONOSCOPY N/A 6/6/2016    Procedure: COMBINED COLONOSCOPY, SINGLE OR MULTIPLE BIOPSY/POLYPECTOMY BY BIOPSY;  Surgeon: Theron Vigil MD;  Location: [facility]    LAPAROSCOPIC HERNIORRHAPHY INGUINAL BILATERAL Bilateral 4/15/2019    Procedure: Laparoscopic Bilateral Inguinal Herniorrhaphy with Mesh;  Surgeon: Mercedes Suazo MD;  Location: [facility] OR    REMOVE FOREIGN BODY LOWER EXTREMITY Left 3/19/2019    Procedure: FOREIGN BODY REMOVAL LEFT KNEE;  Surgeon: Bon Watkins MD;  Location: [facility] OR       Family History:    Family History   Problem Relation Age of Onset    Hypertension Father     Heart Disease Father     Heart Disease Paternal Grandfather     Breast Cancer Sister        Social History:  Marital Status:  Single [1]  Social History     Tobacco Use    Smoking status: Every Day     Current packs/day: 1.00     Average packs/day: 1 pack/day for 30.4 years (30.4 ttl pk-yrs)     Types: Cigarettes     Start date: 10/19/1994    Smokeless tobacco: Never    Tobacco comments:     patient states he would like to    Substance Use Topics    Alcohol use: No    Drug use: Not Currently     Types: Cocaine, Methamphetamines     Comment: minimal        Medications:    No current outpatient medications on file.        Review of Systems   All other systems reviewed and are negative.          Physical Exam   BP: (!) 115/97  Pulse: 118  Temp: 98.6  F (37  C)  Resp: 16  Weight: 82.6 kg (182 lb)  SpO2: 99 %      Physical Exam  Vitals and nursing note reviewed.   Constitutional:       General: He is not in acute distress.     Appearance: Normal appearance. He is not ill-appearing, toxic-appearing or diaphoretic.   HENT:      Head: Normocephalic and atraumatic.      Nose: Nose normal.   Eyes:      Extraocular Movements: Extraocular movements intact.      Conjunctiva/sclera: Conjunctivae normal.   Cardiovascular:      Pulses: Normal pulses.   Pulmonary:      Effort: Pulmonary effort is normal. No respiratory  distress.   Musculoskeletal:      Cervical back: Neck supple.      Comments: Right hand is significantly swollen with erythema through the palm and all fingers. Tenderness through the palm of the hand into the 1st through 4th digits.  There is a small puncture wound noted to the webspace between the first and second digit with crusted blood surrounding.  Unable to visualize foreign body.  Very limited exam due to patient's pain and intolerance to moving fingers.  Increased pain with flexion and extension of the 2nd through 4th fingers.   Skin:     General: Skin is warm and dry.   Neurological:      General: No focal deficit present.      Mental Status: He is alert. Mental status is at baseline.   Psychiatric:         Mood and Affect: Mood is anxious.         Speech: Speech is rapid and pressured.         Behavior: Behavior is hyperactive.             ED Course        Procedures         Trauma Summary Disposition     Patient is trauma admission:  ED evaluation    Intent to transfer: 3/4/2025 @ 1335    Spine  Backboard removal time: Backboard not applied   C-collar and immobilization: not indicated, cleared.  CSpine Clearance: C spine cleared clinically  Full Primary and Secondary survey with appropriate immobilization of spine completed in exam section.     Neuro  GCS at arrival:  Motor 6=Obeys commands   Verbal 5=Oriented   Eye Opening 4=Spontaneous   Total: 15     GCS at disposition: unchanged    ED Procedures completed  none        Transfer Consultant:  Patient s status reviewed with ED physician Dr Rosas at [facility] at 1505,  who agrees to accept patient in transfer.    Results for orders placed or performed during the hospital encounter of 03/04/25 (from the past 24 hours)   XR Hand Right G/E 3 Views    Narrative    EXAM: XR HAND RIGHT G/E 3 VIEWS  LOCATION: [facility]  DATE: 3/4/2025    INDICATION: foreign body  COMPARISON: None.      Impression    IMPRESSION: No  definite radiopaque foreign body is identified. No acute fracture or malalignment. Marginal erosions at the first IP joint suggest inflammatory arthropathy. Mild soft tissue swelling. There is normal joint spacing.       Medications   ampicillin-sulbactam (UNASYN) 3 g vial to attach to  mL bag (0 g Intravenous Stopped 3/4/25 6790)   Tdap (tetanus-diphtheria-acell pertussis) (ADACEL) injection 0.5 mL (0.5 mLs Intramuscular $Given 3/4/25 0067)   ketorolac (TORADOL) injection 15 mg (15 mg Intravenous $Given 3/4/25 8452)         Assessments & Plan (with Medical Decision Making)  Alejandro Alves is a 59 year old male who presented to the ED for concerns of a piece of wood in his right hand.  This occurred last night and is approximately 2 inches in length per his report.  He has had increased pain and swelling since initial injury on arrival he was afebrile.  Tachycardic.  Anxious and hyperactive.  On exam, right hand was significantly swollen with erythema and warmth and tenderness through the 1st through 4th digits.  Significant swelling to the thenar prominence.  There was evidence of puncture wound between the first and second digit webspace but no obvious foreign body able to be removed.  I am concerned that this foreign body has caused an infection.  Patient is exhibiting a buzzing type pain in his second and third digits concerning for nerve involvement of the trauma.  I think he will require hand surgeon involvement for removal of this piece of wood and possible washout.    [facility] contacted to discuss with their plastics team.  In the meantime he was given an updated tetanus shot along with a dose of IV Unasyn.  Toradol given for pain here.  Were also able to get an x-ray which did not show an obvious foreign body.  The plastic surgeon was in the operating room however I spoke with the plastics nurse Jose Maria who discussed case with on-call surgeon and advised that we transfer patient to the  ER and they will coordinate further care from there.  I spoke with the ED provider Dr. Rosas who accepted patient in transfer onto her service.  Patient's sister plans to drive him directly to [facility] ED.  They are agreeable to this plan of care.  Staffed in the ED with Dr. Osborn.     I have reviewed the nursing notes.    I have reviewed the findings, diagnosis, plan and need for follow up with the patient.      New Prescriptions    No medications on file       Final diagnoses:   Foreign body of right hand, initial encounter   Cellulitis of right hand     Note: Chart documentation done in part with Dragon Voice Recognition software. Although reviewed after completion, some word and grammatical errors may remain.     3/4/2025   [facility] EMERGENCY DEPT       Trina Brush PA-C  03/04/25 1511

## 2025-03-04 NOTE — ED NOTES
Patient report given to CHELSIE Rios at [facility] ED. Patient will be transferring by private vehicle. PIV in place. Sister is driving patient and states she is very family with the area and knows exactly where the [place] ED is.

## 2025-03-06 ENCOUNTER — PATIENT OUTREACH (OUTPATIENT)
Dept: CARE COORDINATION | Facility: CLINIC | Age: 60
End: 2025-03-06
Payer: COMMERCIAL

## 2025-03-06 NOTE — PROGRESS NOTES
Clinic Care Coordination Contact    Situation: Patient chart reviewed by care coordinator.    Background: Sw notified that pt was to the ED and then transerred out of system for surgery to remove foreign object from hand.     Assessment: pt had object removed and area cleaned and discharged home with instructions.  Pt's sister is main contact and was at the hospital with him.     Plan/Recommendations: will route [employer] message to pt/sister for check in as often calls to sister need to be scheduled for good connection.     Rabia Mayer, MESSI   [facility] - Care Coordination  [facility]   [phone]

## 2025-03-17 ENCOUNTER — PATIENT OUTREACH (OUTPATIENT)
Dept: CARE COORDINATION | Facility: CLINIC | Age: 60
End: 2025-03-17
Payer: COMMERCIAL

## 2025-03-17 NOTE — PROGRESS NOTES
Clinic Care Coordination Contact    Will send [employer] message to schedule phone call with Sister Emely next week.     If no reply will resend in one week.     Rabia Mayer LAM   [facility] - Care Coordination  [facility]   [phone]

## 2025-03-24 ENCOUNTER — MYC MEDICAL ADVICE (OUTPATIENT)
Dept: INTERNAL MEDICINE | Facility: CLINIC | Age: 60
End: 2025-03-24
Payer: COMMERCIAL

## 2025-03-24 DIAGNOSIS — R09.81 CONGESTION OF PARANASAL SINUS: Primary | ICD-10-CM

## 2025-03-24 DIAGNOSIS — K21.00 GASTROESOPHAGEAL REFLUX DISEASE WITH ESOPHAGITIS WITHOUT HEMORRHAGE: ICD-10-CM

## 2025-03-24 RX ORDER — FLUTICASONE PROPIONATE 50 MCG
1 SPRAY, SUSPENSION (ML) NASAL DAILY
Qty: 16 G | Refills: 3 | Status: SHIPPED | OUTPATIENT
Start: 2025-03-24

## 2025-03-24 RX ORDER — OMEPRAZOLE 20 MG/1
20 CAPSULE, DELAYED RELEASE ORAL DAILY
Qty: 90 CAPSULE | Refills: 3 | Status: SHIPPED | OUTPATIENT
Start: 2025-03-24

## 2025-03-24 RX ORDER — CETIRIZINE HYDROCHLORIDE 10 MG/1
10 TABLET ORAL DAILY
Qty: 90 TABLET | Refills: 1 | Status: SHIPPED | OUTPATIENT
Start: 2025-03-24

## 2025-04-01 ENCOUNTER — PATIENT OUTREACH (OUTPATIENT)
Dept: FAMILY MEDICINE | Facility: CLINIC | Age: 60
End: 2025-04-01
Payer: COMMERCIAL

## 2025-04-01 NOTE — PROGRESS NOTES
Clinic Care Coordination Contact  Follow Up Progress Note      Assessment: sister noted that they had to reschedule the psychiatry appointment until May 20th due to hand surgery.  Sister is helping work on Supplemental Security Income and getting supports in place.  She is struggling to reach someone around her work hours and getting the  .     Care Gaps:    Health Maintenance Due   Topic Date Due    ZOSTER IMMUNIZATION (1 of 2) Never done    LUNG CANCER SCREENING  Never done    Pneumococcal Vaccine: 50+ Years (2 of 2 - PPSV23) 01/21/2019    INFLUENZA VACCINE (1) 09/01/2024    COVID-19 Vaccine (1 - 2024-25 season) Never done       Postponed to future as focus is on his supplemental security income and help for his MH.     Care Plans  Care Plan: Mental Health       Problem: Mental Health Symptoms Need Improvement       Long-Range Goal: Improve management of mental health symptoms and establish with mental health/psychosocial supports       Start Date: 8/23/2024 Expected End Date: 8/23/2025    This Visit's Progress: 40% Recent Progress: 40%    Note:     Barriers: unknown concerns  Strengths: supportive family   Patient expressed understanding of goal: yes  Action steps to achieve this goal:  1. I will complete a neuropsych assessment (completed 1/25)  2. I will complete a diagnostic assessment with mental health provider and counseling  3. I will work with care coordination to identify and connect with resources as assessments are completed, such as [facility] , [facility] supports, etc    Psychiatry scheduled 5/20/25                              Intervention/Education provided during outreach: Discussed how the [facility] could assist with supports and prioritizing steps.      Discussed sending the honoring choices form to [facility] [employer].  Website given.     Outreach Frequency: monthly, more frequently as needed    Plan:   Sister to assist pt with appointments and social security and county  Care  Coordinator will follow up in three weeks via [employer] to schedule an appointment.    Rabia Mayer LAM   [facility] - Care Coordination  [facility]   [phone]

## 2025-04-24 ENCOUNTER — PATIENT OUTREACH (OUTPATIENT)
Dept: CARE COORDINATION | Facility: CLINIC | Age: 60
End: 2025-04-24
Payer: COMMERCIAL

## 2025-04-24 NOTE — PROGRESS NOTES
Clinic Care Coordination Contact  Care Team Conversations    MyChart message sent for setting up an appointment next week to [facility] base.  If no response will send again in 1 week.    Rabia Mayer, [facility] - Care Coordination  [facility]   [phone]

## 2025-05-01 ENCOUNTER — DOCUMENTATION ONLY (OUTPATIENT)
Dept: OTHER | Facility: CLINIC | Age: 60
End: 2025-05-01
Payer: COMMERCIAL

## 2025-05-06 ENCOUNTER — PATIENT OUTREACH (OUTPATIENT)
Dept: FAMILY MEDICINE | Facility: CLINIC | Age: 60
End: 2025-05-06
Payer: COMMERCIAL

## 2025-05-06 NOTE — LETTER
It was a pleasure to speak with you.  I would like to provide you with the enclosed information for your records.  As part of care coordination, we are developing care plans to assist in accomplishing your health care goals.  When we speak next, please feel free to let me know if you want to add or change any information on your care plans.    As always, feel free to contact me if you have any questions or concerns.  I look forward to working with you in the effort to achieve your health care and wellness goals .        Sincerely,      Rabia Mayer, [facility]  Clinic Care Coordination  [phone]    [facility]  Patient Centered Plan of Care  About Me:        Patient Name:  Alejandro Nair    YOB: 1965  Age:         59 year old   [employer] MRN:    [id number] Telephone Information:  Home Phone [phone]   Mobile [phone]       Address:  [address] [place] [id number] Email address:  [email or web address]      Emergency Contact(s)    Name Relationship Lgl Grd Work Phone Home Phone Mobile Phone   1. ANIBAL RAJPUT Sister No [phone]   2. BERNY LÓPEZ Sister No   [phone]   3. BERNADETTE NAIR Father No   [phone]           Primary language:  English     needed? No   [employer]:  [phone] op. 1  Other communication barriers:Other (per sister may not be accurate or not share and uncertain of cause)    Preferred Method of Communication:  Mail  Current living arrangement: I live alone    Mobility Status/ Medical Equipment: Independent        Health Maintenance  Health Maintenance Reviewed: Due/Overdue   Health Maintenance Due   Topic Date Due    ZOSTER IMMUNIZATION (1 of 2) Never done    LUNG CANCER SCREENING  Never done    Pneumococcal Vaccine: 50+ Years (2 of 2 - PPSV23) 01/21/2019    COVID-19 Vaccine (1 - 2024-25 season) Never done           My Access Plan  Medical Emergency 911   Primary Clinic Line [facility]  [facility] - [phone]   24 Hour Appointment Line [phone] or  [phone]) (toll-free)   24 Hour Nurse Line [phone] (toll-free)   Preferred Urgent Care [facility], [phone]     Preferred Hospital [facility]  [phone]     Preferred Pharmacy [facility] [address] [place] [address] [address]     Behavioral Health Crisis Line The National Suicide Prevention Lifeline at 1-326.735.7637 or Text/Call 738           My Care Team Members  Patient Care Team         Relationship Specialty Notifications Start End    Tyrone Rhodes MD PCP - General   8/8/24     Phone: [phone] Fax: [phone]         [facility] [address]    Rabia Mayer LSW Lead Care Coordinator Primary Care - [facility] Admissions 8/21/24     Phone: [phone] Fax: [phone]        Tyrone Rhodes MD Assigned PCP   8/23/24     Phone: [phone] Fax: [phone]         [address] [place] [address]                My Care Plans  Self Management and Treatment Plan    Care Plan  Care Plan: Mental Health       Problem: Mental Health Symptoms Need Improvement       Long-Range Goal: Improve management of mental health symptoms and establish with mental health/psychosocial supports       Start Date: 8/23/2024 Expected End Date: 8/23/2025    This Visit's Progress: 40% Recent Progress: 40%    Note:     Barriers: unknown concerns  Strengths: supportive family   Patient expressed understanding of goal: yes  Action steps to achieve this goal:  1. I will complete a neuropsych assessment (completed 1/25)  2. I will complete a diagnostic assessment with mental health provider and counseling  3. I will work with care coordination to identify and connect with resources as assessments are completed, such as [facility] , [facility] supports, etc    Psychiatry scheduled 5/20/25                              Action Plans on  File:                       Advance Care Plans/Directives:             My Medical and Care Information  Problem List   Patient Active Problem List   Diagnosis    Tobacco use disorder    Restless legs syndrome (RLS)    Hip pain, right    Subacromial impingement, right    Superior glenoid labrum lesion of right shoulder, initial encounter    Incomplete tear of right rotator cuff    Acute foreign body of left knee, initial encounter    Foreign body of knee, left, initial encounter    Peritonsillar abscess    Hernia, abdominal      Current Medications:  Please refer to the most recent medication list provided to you by your medical team and reach out to your provider with any questions or to make any corrections.    Care Coordination Start Date: 8/21/2024   Frequency of Care Coordination: monthly, more frequently as needed     Form Last Updated: 05/06/2025

## 2025-05-06 NOTE — PROGRESS NOTES
Clinic Care Coordination Contact  Follow Up Progress Note      Assessment: Per Emely she has been trying to reach the [facility] with questions and no one is calling her back from the [facility]. She is trying to get [facility] supports for his MH and financial supports. They have psychiatry scheduled on 5/20/25.     Pt is doing about the same.     Care Gaps:    Health Maintenance Due   Topic Date Due    ZOSTER IMMUNIZATION (1 of 2) Never done    LUNG CANCER SCREENING  Never done    Pneumococcal Vaccine: 50+ Years (2 of 2 - PPSV23) 01/21/2019    COVID-19 Vaccine (1 - 2024-25 season) Never done       Postponed to future office visits     Care Plans  Care Plan: Mental Health       Problem: Mental Health Symptoms Need Improvement       Long-Range Goal: Improve management of mental health symptoms and establish with mental health/psychosocial supports       Start Date: 8/23/2024 Expected End Date: 8/23/2025    This Visit's Progress: 40% Recent Progress: 40%    Note:     Barriers: unknown concerns  Strengths: supportive family   Patient expressed understanding of goal: yes  Action steps to achieve this goal:  1. I will complete a neuropsych assessment (completed 1/25)  2. I will complete a diagnostic assessment with mental health provider and counseling  3. I will work with care coordination to identify and connect with resources as assessments are completed, such as [facility] , [facility] supports, etc    Psychiatry scheduled 5/20/25                              Intervention/Education provided during outreach: encouraged completion of a response form with the [facility] about her not getting calls back or call and talk with a supervisor.       Outreach Frequency: monthly, more frequently as needed      Plan:   Sister to bring pt to psychiatry and work on [facility] programs.  Will discuss alternative ways to get [facility] services then going through the [facility] if she would like.   Care Coordinator will follow up in three weeks with a  [employer] message to schedule a call the next week.     MESSI Muñoz   [facility] - Care Coordination  [facility]   [phone]

## 2025-05-19 ASSESSMENT — ANXIETY QUESTIONNAIRES
4. TROUBLE RELAXING: NEARLY EVERY DAY
6. BECOMING EASILY ANNOYED OR IRRITABLE: NEARLY EVERY DAY
5. BEING SO RESTLESS THAT IT IS HARD TO SIT STILL: NEARLY EVERY DAY
7. FEELING AFRAID AS IF SOMETHING AWFUL MIGHT HAPPEN: SEVERAL DAYS
2. NOT BEING ABLE TO STOP OR CONTROL WORRYING: NEARLY EVERY DAY
IF YOU CHECKED OFF ANY PROBLEMS ON THIS QUESTIONNAIRE, HOW DIFFICULT HAVE THESE PROBLEMS MADE IT FOR YOU TO DO YOUR WORK, TAKE CARE OF THINGS AT HOME, OR GET ALONG WITH OTHER PEOPLE: EXTREMELY DIFFICULT
1. FEELING NERVOUS, ANXIOUS, OR ON EDGE: NEARLY EVERY DAY
GAD7 TOTAL SCORE: 19
3. WORRYING TOO MUCH ABOUT DIFFERENT THINGS: NEARLY EVERY DAY
8. IF YOU CHECKED OFF ANY PROBLEMS, HOW DIFFICULT HAVE THESE MADE IT FOR YOU TO DO YOUR WORK, TAKE CARE OF THINGS AT HOME, OR GET ALONG WITH OTHER PEOPLE?: EXTREMELY DIFFICULT
GAD7 TOTAL SCORE: 19

## 2025-05-19 ASSESSMENT — PATIENT HEALTH QUESTIONNAIRE - PHQ9
10. IF YOU CHECKED OFF ANY PROBLEMS, HOW DIFFICULT HAVE THESE PROBLEMS MADE IT FOR YOU TO DO YOUR WORK, TAKE CARE OF THINGS AT HOME, OR GET ALONG WITH OTHER PEOPLE: EXTREMELY DIFFICULT
SUM OF ALL RESPONSES TO PHQ QUESTIONS 1-9: 16
SUM OF ALL RESPONSES TO PHQ QUESTIONS 1-9: 16

## 2025-05-20 ENCOUNTER — VIRTUAL VISIT (OUTPATIENT)
Dept: PSYCHIATRY | Facility: CLINIC | Age: 60
End: 2025-05-20
Payer: COMMERCIAL

## 2025-05-20 DIAGNOSIS — F90.1 ATTENTION DEFICIT HYPERACTIVITY DISORDER (ADHD), PREDOMINANTLY HYPERACTIVE TYPE: Primary | ICD-10-CM

## 2025-05-20 DIAGNOSIS — R41.89: ICD-10-CM

## 2025-05-20 DIAGNOSIS — F19.10 DRUG ABUSE (H): ICD-10-CM

## 2025-05-20 DIAGNOSIS — R41.3 MEMORY LOSS: ICD-10-CM

## 2025-05-20 DIAGNOSIS — F41.1 GAD (GENERALIZED ANXIETY DISORDER): ICD-10-CM

## 2025-05-20 RX ORDER — CLONIDINE HYDROCHLORIDE 0.1 MG/1
0.1 TABLET ORAL AT BEDTIME
Qty: 30 TABLET | Refills: 1 | Status: SHIPPED | OUTPATIENT
Start: 2025-05-20

## 2025-05-20 RX ORDER — ATOMOXETINE 18 MG/1
18 CAPSULE ORAL DAILY
Qty: 30 CAPSULE | Refills: 1 | Status: SHIPPED | OUTPATIENT
Start: 2025-05-20

## 2025-05-20 ASSESSMENT — PAIN SCALES - GENERAL: PAINLEVEL_OUTOF10: MILD PAIN (3)

## 2025-05-20 NOTE — PROGRESS NOTES
"Virtual Visit Details    Type of service:  Video Visit   Video Start Time: 1:05p  Video End Time: 2:25p    Originating Location (pt. Location): Home    Distant Location (provider location):  Off-site  Platform used for Video Visit: Christopher Alves is a 59 year old referred by Tyrone Rhodes for evaluation of ADHD and anxiety. Initial consultation on 05/20/25.   Who referred you to care?: (Patient-Rptd) primary care provider  CARE TEAM:   PCP- Tyrone Rhodes  Care Coordinator- Rabia Mayer LSW                  Chief Complaint   Reilly identified the reason for seeking services at this time as: \"(Patient-Rptd) ADHD (problem since childhood and untreated), Anxiety/depression medication support\". Reported this as beginning approximately (Patient-Rptd) 12/01/16.                Assessment & Plan   Reilly  is a 59 year old White Not  or  individual presenting for psychiatric evaluation and medication management through the [facility] . Information is obtained from patient and available records.  Reports history of ADHD and drug use, and memory loss. Previously psychiatrically hospitalized in 1999 Hx of suicidal ideation, no suicide attempts. No history of self-injurious behaviors. Genetically loaded for  ADHD. Grew up in a chaotic environment experiencing loss of friends, physical trauma, loss of Mother and these life events are likely contributing to the clinical picture.  History and interview support the following diagnoses:        Attention deficit hyperactivity disorder (ADHD), predominantly hyperactive type  NIMO (generalized anxiety disorder)  Drug abuse (H)  Memory loss  Low average cognitive ability    Psychotherapy discussion: Primary recommendation for the treatment of mood symptoms, especially anxiety. Supportive therapy can be useful for reducing the impact of acute or chronic psychosocial stressors. CBT can be particularly helpful for ruminative thinking " and addressing maladaptive coping mechanisms that may worsen anxiety.   Medication discussion:   Primary mood support medications:   Patient presents today to establish psychiatric care ADHD is not controlled.  He is currently in active methamphetamine use we are starting him on Strattera 18 mg to treat ADHD we are also starting clonidine 0.1 mg at bedtime to help with physical symptoms and anxiety and executive dysfunction with the hope of addressing insomnia as well.  I did discuss the risks and benefits of concurrent methamphetamine use with Strattera and both clonidine at this time he is agreeable to reducing the use of methamphetamine he does appear future oriented and plans to reduce his use.  I did offer to place a referral to addiction medicine or to a therapist specializing in his substance use to further assess and treat patient is not interested at this time.  I also suspect his cognitive functioning is contributing to his presentation as well which is most likely contributing to increased feelings of anxiety worsening ADHD symptoms and worsening impulse control.  He does appear to have good social supports and people in his life.     [place]-PDMP was [place] today: not using controlled substances    PSYCHOTROPIC DRUG INTERACTIONS:   N/A  MANAGEMENT:  N/A  Safety Risk-Reilly Grover did not appear to be an imminent safety risk to self or others.              Plan    1) PSYCHOTROPIC MEDICATION RECOMMENDATIONS:  START:     START:     atomoxetine (STRATTERA) 18 MG capsule, Take 1 capsule (18 mg) by mouth daily., Disp: 30 capsule, Rfl: 1    cloNIDine (CATAPRES) 0.1 MG tablet, Take 1 tablet (0.1 mg) by mouth at bedtime., Disp: 30 tablet, Rfl: 1        2) THERAPY: Psychotherapy is a primary recommendation.   Currently seeing none  May benefit from CBT.       3) NEXT DUE:   Labs- Routine monitoring is not indicated for current psychotropic medication regimen   ECG- Routine monitoring is not indicated for current  "psychotropic medication regimen   Rating Scales- N/A    4) REFERRALS / COORDINATION: None    5) DISPOSITION:   - Pt would benefit from brief psychiatric intervention (up to ~5 visits) to adjust meds and gauge for benefit.   - Follow up with Rito Pacheco PA-C in 4 weeks. Plan to transition med management back to designated prescriber after brief care is complete.   - If not seen for 6 months, follow up and prescribing will automatically revert back to referring provider / PCP.     Treatment Risk Statement:  The patient understands the risks, benefits, adverse effects and alternatives. Agrees to treatment with the capacity to do so. No medical contraindications to treatment. Agrees to contact care team for any problems. The patient understands to call 911 or go to the nearest ED if urgent or life threatening symptoms occur. Crisis resources are provided routinely in the After Visit Summary.       PROVIDER:  Rito Pacheco PA-C                  History of Present Illness   Notes he has ADHD. Accompanied by sister that helps with  and medical management. Has not been treated. Since 2016 has gone downhill. Show signs of depression and sister believes anixety is contritubuting to anxiety. Medication in the past: 1999 diagnosed with ADHD for stimulant use was not effective. Has self medicated in his 20s. Notes he has many tasks and talks a \"mile a minute\" and in the past using cocaine which he noted is calmed him down. Notes he has been high functioning. Since then since 44 years old to now has been wearing his diagnosis on his sleeve notes increased forgetfulness. Notes symptoms has been exacerbated. Notes neurocognitive changes. Notes zero time management due to executive dysfunction. 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 Notes he isolates and family is supportive. Sister is main person who contacts him. Notes Father and Step " Mother are supportive. No social interaction and quality of life. Dr Rhodes has been treating him to get on a sleep schedule.     Recent Symptoms:   Depression: Isolation and notes this is frustrating.  Notes low motivation. Ex: not sure what happened in comparison to his baseline earlier in his 20s. Notes he is hard on himself to the point of exhaustion.    Mitzy:  denies. Sleeping excessively or too little.   Psychosis: Denies.   Anxiety: Inability to make a phone call. See HPI above. Notes exhaustion.   Panic:  Endorses. Notes he gets in a [place]. Overstimulating in places such as menards. Notes no control and avoids people and wants to remove himself from situations. Notes when he has many projects he gets overstimulated, notes he stands and screams in the middle of the house alone.    Post Traumatic Stress Disorder: notes multiple concussions when he was young. Losing his mother to cancer in 2017. Notes friends have passed away due to substance use. Notes symptoms in current life.   Eating Disorder: denies.   ADD / ADHD:  See HPI above. Previously diagnosed, increased forgetfulness. Not finishing projects.      Conduct Disorder: No symptoms  Autism Spectrum Disorder: No symptoms  Obsessive Compulsive Disorder: Mother had OCD. Notes everything has to be in a certain order. Closet is color coded.   Personality Disorders:  none    Patient reports the following compulsive behaviors and treatment history: denies.      Diagnostic Criteria:   Attention deficit hyperactivity disorder (ADHD), predominantly hyperactive type  NIMO (generalized anxiety disorder)      Pertinent Social Hx:  FINANCIAL SUPPORT-Notes he has food, energy assistance. Father pays for his mortgage and electric, gas, and water, and phone. Applying for ssi. Sister notes he may not qualify for disability.   LIVING SITUATION / RELATIONSHIPS- alone   SOCIAL/ SPIRITUAL SUPPORT-notes familial support but friendships have dwindled.     Pertinent Substance  Use  Alcohol-none  Nicotine- yes.   Caffeine-soda.   Opioids-none   Narcan Kit- N/A  THC/CBD- None  Other Illicit Drugs- methamphetamine    Substance Use History  Past Use- alcohol and cocaine  Treatment [#, most recent]- treatment for cocaine use. In rehab at [facility] and outpatient rehab.   Medical Consequences [withdrawal, sz etc]-paralysis in 1999 and suffered a stroke.   Legal Consequences- multiple DUIs.                 Medical Review of Systems   Dizziness/orthostasis-denies  Headaches-endorses since last year. Working with PCP now on sinus medication.   GI-denies.   Sexual health concerns-denies.                 Past Psychiatric History   Have you been previously diagnosed with any of these mental health condition(s)?: (Patient-Rptd) ADHD;an anxiety disorder;depression;other, If other, please list below:: (Patient-Rptd) Mild Neurocognitive Disorder What mental health services have you received in the past?: (Patient-Rptd) other, If other, please list below:: (Patient-Rptd) substance abuse treatment for cocaine addiction in 1999 Currently, are you receiving any of the following mental health services?: (Patient-Rptd) none    Self injurious behavior [method, most recent]-denies.   Suicide attempt [#, most recent, method]-denies  Suicidal ideation [passive, active]-passive, no intent or plan, denies current SI.    What in the following list protects you from causing harm to yourself or others?: (Patient-Rptd) forward or future oriented thinking;dedication to family or friends;safe and stable environment;purpose;secure attachment;strong sense of self worth or esteem;sense of personal control or determination, Are there firearms in your home?: (Patient-Rptd) are not    Psychosis hx- None  Psych hosp [ #, most recent, committed]- None  ECT/TMS [#, most recent]- None    Eating disorder hx- None  Trauma hx- endorses, see HPI above.   Outpatient programs [Day treatment, DBT, eating disorder tx, etc]- endorses for  rehab.               Past Psychotropic Medications     Medication Max Dose (mg) Dates / Duration Helpful? DC Reason / Adverse Effects?   Lorazepam        hydroxyzine                                                 Are you taking/ not taking prescription medications as they were prescribed?: (Patient-Rptd) taking              Social History                [per patient report]  Financial- What are your current financial sources?: (Patient-Rptd) parents;family;other, If other, please list below:: (Patient-Rptd) SNAP and [employer] energy assistance. Working on [employer] application., Does your finances cause stress?: (Patient-Rptd) does  Employment- What is your employment status?: (Patient-Rptd) unemployed, If you work in a paying job or as a volunteer, describe the job and how long you have held it: : (Patient-Rptd) Not working.  Haven't had meaningful employment since 2016.  See Neuropsychological testing for additional details. Did you serve in the ?: (Patient-Rptd) did not  Living situation- What is your housing situation?: (Patient-Rptd) staying in own home/apartment  Feels safe at home- Yes  Household / family- Name: (Patient-Rptd) Oswaldo Taverasstock, Age: (Patient-Rptd) 86, Relationship: (Patient-Rptd) father, Living in same house?: (Patient-Rptd) no, Name: (Patient-Rptd) Kim Lesvia, Age: (Patient-Rptd) 84, Relationship: (Patient-Rptd) stepmother, Living in same house?: (Patient-Rptd) no, Name: (Patient-Rptd) Emely Martin, Age: (Patient-Rptd) 58, Relationship: (Patient-Rptd) sister, Living in same house?: (Patient-Rptd) no, Name: (Patient-Rptd) Monica Ash, Age: (Patient-Rptd) 61, Relationship: (Patient-Rptd) sister, Living in same house?: (Patient-Rptd) no  Relationships- What is your current relationship status? : (Patient-Rptd) single, What is your sexual orientation?: (Patient-Rptd) heterosexual  Children- Do you have children?: (Patient-Rptd) no  Social/spiritual support- Who are the most supportive  people in your life?  : (Patient-Rptd) parents;siblings  Cultural- What is your cultural background? : (Patient-Rptd) , What are ethnic, cultural, or Episcopal influences that may be useful to know about you (for example history of experiencing discrimination, growing up rural/urban, valuing culturally specific treatments)?  : (Patient-Rptd) none, What is your preferred language?  : (Patient-Rptd) English  Education- What is your highest education? : (Patient-Rptd) high school graduate  Early history- Where did you grow up?: (Patient-Rptd) other, If other, please list below:: (Patient-Rptd) [place], [place], Who took care of you as a child?: (Patient-Rptd) biological parents  Raised by- How would you describe your parent's relationships?: (Patient-Rptd)  / , How old were you when this happened?: (Patient-Rptd) 13  Siblings- Do you have siblings?: (Patient-Rptd) yes, How many full siblings do you have?: (Patient-Rptd) 2  Quality of family relationships- How would you describe your current family relationships?: (Patient-Rptd) other, If other, please elaborate:: (Patient-Rptd) they are supportive and concerned/loving but have regular contact mainly with sister Emely only because of isolating/avoidance behavior  Legal- Have you been involved with the legal system (child custody, order for protection, DWI, etc.)?: (Patient-Rptd) have, If yes, please describe:: (Patient-Rptd) DWI - mulitple... but not for many years, Do you have a ?  : (Patient-Rptd) does not                Family History   I have reviewed this patient's family history and updated it with pertinent information if needed.  Family History   Problem Relation Age of Onset    Hypertension Father     Heart Disease Father     Heart Disease Paternal Grandfather     Breast Cancer Sister     ADHD.                   Past Medical History     Neurologic Hx [head injury, seizures, etc]: concussions in 20s due to hockey. Fell  off of a ladder in the past 5 years. Dr. Rhodes ordered MRI.   Patient Active Problem List   Diagnosis    Tobacco use disorder    Restless legs syndrome (RLS)    Hip pain, right    Subacromial impingement, right    Superior glenoid labrum lesion of right shoulder, initial encounter    Incomplete tear of right rotator cuff    Acute foreign body of left knee, initial encounter    Foreign body of knee, left, initial encounter    Peritonsillar abscess    Hernia, abdominal     Past Medical History:   Diagnosis Date    Tobacco use disorder                    Medications   Current Outpatient Medications   Medication Sig Dispense Refill    cetirizine (ZYRTEC) 10 MG tablet Take 1 tablet (10 mg) by mouth daily. 90 tablet 1    fluticasone (FLONASE) 50 MCG/ACT nasal spray Spray 1 spray into both nostrils daily. 16 g 3    omeprazole (PRILOSEC) 20 MG DR capsule Take 1 capsule (20 mg) by mouth daily. 90 capsule 3   .                 Physical Exam  (Vitals Only)  There were no vitals taken for this visit.    Pulse Readings from Last 5 Encounters:   03/04/25 118   02/19/25 82   09/25/24 69   08/12/24 72   05/26/23 104     Wt Readings from Last 5 Encounters:   03/04/25 82.6 kg (182 lb)   02/19/25 82.9 kg (182 lb 12.8 oz)   09/25/24 72.3 kg (159 lb 4.8 oz)   08/12/24 69.9 kg (154 lb)   05/26/23 67.6 kg (149 lb)     BP Readings from Last 5 Encounters:   03/04/25 (!) 115/97   02/19/25 132/85   09/25/24 130/74   08/12/24 124/70   05/26/23 118/74                   Mental Status Exam  General/Constitutional:  Appearance:  awake, alert, adequately groomed, appeared stated age and no apparent distress  Attitude:   cooperative   Eye Contact:  good  Musculoskeletal:  Psychomotor Behavior:  no evidence of tardive dyskinesia, dystonia, or tics from the head up  Psychiatric:  Speech:  clear, coherent, high rate, rhythm, and volume,  mildly pressured speech.   Associations:  no loose associations  Thought Process:  logical, linear and goal  oriented  Thought Content:   No evidence of suicidal ideation or homicidal ideation, no evidence of psychotic thought, no auditory hallucinations present and no visual hallucinations present  Mood:  anxiety  Affect:  full range/stable (normal variation of emotions during exam) and was congruent to speech content.  Insight:  good  Judgment:  intact, adequate for safety  Impulse Control:  intact  Neurological:  Oriented to:  person, place, time, and situation  Attention Span and Concentration:  Able to attend to the interview     Language: intact    Recent and Remote Memory:  Intact to interview. Not formally assessed. No amnesia.   Fund of Knowledge: appropriate                    Data       5/19/2025     5:13 PM   PROMIS-10 Total Score w/o Sub Scores   PROMIS TOTAL - SUBSCORES 19         5/19/2025     5:49 PM   CAGE-AID Total Score   Total Score 2   Total Score MyChart 2 (A total score of 2 or greater is considered clinically significant)         2/19/2025     3:33 PM 5/19/2025     5:47 PM   PHQ   PHQ-9 Total Score 20  16    Q9: Thoughts of better off dead/self-harm past 2 weeks Not at all Not at all       Patient-reported         2/19/2025     3:45 PM 5/19/2025     5:09 PM   NIMO-7 SCORE   Total Score 20 (severe anxiety) 19 (severe anxiety)   Total Score 20  19        Patient-reported         Liver/kidney function Metabolic Blood counts   Recent Labs   Lab Test 08/12/24  0757 05/26/23  1523 05/11/23  0702 05/09/23  2124   CR 0.90 0.88   < > 0.64*   AST 23  --   --  19   ALT 21  --   --  25   ALKPHOS 97  --   --  83    < > = values in this interval not displayed.    Recent Labs   Lab Test 09/25/24  1652 08/12/24  0757 05/26/23  1523   CHOL  --   --  176   TRIG  --   --  80   LDL  --   --  94   HDL  --   --  66   A1C  --  5.6  --    TSH 1.71  --   --     Recent Labs   Lab Test 08/12/24 0757   WBC 7.1   HGB 13.6   HCT 40.1   MCV 91           ECG results from 05/10/23   EKG 12-lead, tracing only     Value     Systolic Blood Pressure     Diastolic Blood Pressure     Ventricular Rate 77    Atrial Rate 77    MO Interval 128    QRS Duration 100        QTc 461    P Axis 68    R AXIS 79    T Axis 66    Interpretation ECG      Sinus rhythm  Normal ECG  When compared with ECG of 28-FEB-2006 12:19,  Millie-Parkinson-White is no longer Present  Confirmed by MD MARX LUCIANO ([id number]),  Wendie Zhu ([id number]) on 5/15/2023 1:29:42 PM

## 2025-05-20 NOTE — NURSING NOTE
Is the patient currently in the state of [place]? YES    Current patient location: [address] [place] [id number]    Visit mode:Video    If the visit is dropped, the patient can be reconnected by: VIDEO VISIT: Text to cell phone:   Telephone Information:   Mobile [phone]       Will anyone else be joining the visit? Yes: How would they like to receive their invite Text to cell phone: [phone]  (If patient encounters technical issues they should call [phone])    Are changes needed to the allergy or medication list? Pt stated no changes to allergies and Pt stated no med changes    Are refills needed on medications prescribed by this physician? No    Rooming Documentation: Attendance Guidelines - Care team has reviewed attendance agreement with patient. Patient advised that two failed appointments within 6 months may lead to termination of current episode of care.      Reason for visit: Consult     MAXIME Ley

## 2025-05-28 ENCOUNTER — PATIENT OUTREACH (OUTPATIENT)
Dept: CARE COORDINATION | Facility: CLINIC | Age: 60
End: 2025-05-28
Payer: COMMERCIAL

## 2025-05-28 NOTE — PROGRESS NOTES
Clinic Care Coordination Contact    Sent [employer] message for next check in. If no reply will resend in one week.     Rabia Mayer LAM   [facility] - Care Coordination  [facility]   [phone]

## 2025-06-16 ENCOUNTER — PATIENT OUTREACH (OUTPATIENT)
Dept: CARE COORDINATION | Facility: CLINIC | Age: 60
End: 2025-06-16
Payer: COMMERCIAL

## 2025-06-17 ASSESSMENT — KOOS JR
TWISING OR PIVOTING ON KNEE: SEVERE
STANDING UPRIGHT: MODERATE
GOING UP OR DOWN STAIRS: MODERATE
RISING FROM SITTING: MODERATE
KOOS JR SCORING: 50.01
STRAIGHTENING KNEE FULLY: MODERATE
BENDING TO THE FLOOR TO PICK UP OBJECT: SEVERE
HOW SEVERE IS YOUR KNEE STIFFNESS AFTER FIRST WAKING IN MORNING: MILD

## 2025-06-18 ENCOUNTER — PATIENT OUTREACH (OUTPATIENT)
Dept: CARE COORDINATION | Facility: CLINIC | Age: 60
End: 2025-06-18

## 2025-06-18 NOTE — PROGRESS NOTES
Clinic Care Coordination Contact    Call placed to sister and she was having an emergency with her dog and needs to reschedule.  Will send a [employer] message for rescheduling.     If no reply in two weeks will send another message.     MESSI Muñoz   [facility] - Care Coordination  [facility]   [phone]

## 2025-06-19 ENCOUNTER — ANCILLARY PROCEDURE (OUTPATIENT)
Dept: GENERAL RADIOLOGY | Facility: CLINIC | Age: 60
End: 2025-06-19
Attending: ORTHOPAEDIC SURGERY
Payer: COMMERCIAL

## 2025-06-19 ENCOUNTER — OFFICE VISIT (OUTPATIENT)
Dept: ORTHOPEDICS | Facility: CLINIC | Age: 60
End: 2025-06-19
Attending: INTERNAL MEDICINE
Payer: COMMERCIAL

## 2025-06-19 VITALS — BODY MASS INDEX: 27.28 KG/M2 | WEIGHT: 180 LBS | RESPIRATION RATE: 16 BRPM | HEIGHT: 68 IN

## 2025-06-19 DIAGNOSIS — M25.561 CHRONIC PAIN OF RIGHT KNEE: Primary | ICD-10-CM

## 2025-06-19 DIAGNOSIS — G89.29 CHRONIC PAIN OF RIGHT KNEE: Primary | ICD-10-CM

## 2025-06-19 DIAGNOSIS — M25.561 RIGHT KNEE PAIN, UNSPECIFIED CHRONICITY: ICD-10-CM

## 2025-06-19 DIAGNOSIS — M17.11 PRIMARY OSTEOARTHRITIS OF RIGHT KNEE: ICD-10-CM

## 2025-06-19 NOTE — PATIENT INSTRUCTIONS
Options for osteoarthritis.    Weight loss.  Legs feel better the lighter you are.  Stay active - walking, bicycle, swimming.  Avoid high impact.  Vitamins - Glucosamine 1500 mg/day and chondroitin sulfate 1200 mg/day. It works for dogs and horses, no proof it helps people.  Non-medical options (other things I've heard of):  * tart cherry juice is thought to be a natural anti-inflammatory.    *1 tablespoon of apple cider vinegar daily  *Turmeric is thought to be a natural anti-inflammatory.  *1 tablespoon of unflavored gelatin daily, [place] Gelatin or Slade Nutrajoint  *Ointments on joints - Icy Hot, BenGay, Capsaicin cream, Mineral Ice, Flexall, .  Tylenol up to 3000 mg / day.  NSAIDs - Aleve up to 4 tablets per day or ibuprofen up to 12 tablets per day.   Prescription forms.  Steroid injection - cortisone.  Lubricant injection.   brace.  Surgery.  Usually joint replacement.

## 2025-06-19 NOTE — LETTER
6/19/2025      Alejandro Alves  [address] [facility] [id number]      Dear Colleague,    Thank you for referring your patient, Alejandro Alves, to the [facility]. Please see a copy of my visit note below.    Alejandro Alves is a 59 year old male who is seen in consultation at the request of Dr. Rhodes for right knee pain.  He has had this for at least the last couple years.  He recalls no definite history of injury.  He has some constant aching but especially sharp shooting pains if he twists wrong.  His pain gets as bad as 6-7 out of 10 when it occurs.  He has taken Tylenol.  He has not taken anti-inflammatories or had any cortisone shots.  He is not employed and is here with his sister who helps him with medical and financial decision making.    X-ray shows moderate osteoarthritis of the right knee.  There is definite medial space narrowing.  On the lateral view it is difficult to tell if it might be bone-on-bone.    Past Medical History:   Diagnosis Date     Tobacco use disorder        Past Surgical History:   Procedure Laterality Date     COLONOSCOPY N/A 6/6/2016    Procedure: COMBINED COLONOSCOPY, SINGLE OR MULTIPLE BIOPSY/POLYPECTOMY BY BIOPSY;  Surgeon: Theron Vigil MD;  Location: [facility]     LAPAROSCOPIC HERNIORRHAPHY INGUINAL BILATERAL Bilateral 4/15/2019    Procedure: Laparoscopic Bilateral Inguinal Herniorrhaphy with Mesh;  Surgeon: Mercedes Suazo MD;  Location: [facility]     REMOVE FOREIGN BODY LOWER EXTREMITY Left 3/19/2019    Procedure: FOREIGN BODY REMOVAL LEFT KNEE;  Surgeon: Bon Watkins MD;  Location: [facility]       Family History   Problem Relation Age of Onset     Hypertension Father      Heart Disease Father      Heart Disease Paternal Grandfather      Breast Cancer Sister        Social History     Socioeconomic History     Marital status: Single     Spouse name: Not on file     Number of children: Not on file     Years of  education: Not on file     Highest education level: Not on file   Occupational History     Not on file   Tobacco Use     Smoking status: Every Day     Current packs/day: 1.00     Average packs/day: 1 pack/day for 30.7 years (30.7 ttl pk-yrs)     Types: Cigarettes     Start date: 10/19/1994     Smokeless tobacco: Never     Tobacco comments:     5/20/25 - pt states he smokes about half a pack per day.               patient states he would like to    Substance and Sexual Activity     Alcohol use: No     Drug use: Not Currently     Types: Cocaine, Methamphetamines     Comment: minimal     Sexual activity: Not Currently     Partners: Female     Birth control/protection: None   Other Topics Concern     Parent/sibling w/ CABG, MI or angioplasty before 65F 55M? No   Social History Narrative     Not on file     Social Drivers of Health     Financial Resource Strain: Low Risk  (2/19/2025)    Financial Resource Strain      Within the past 12 months, have you or your family members you live with been unable to get utilities (heat, electricity) when it was really needed?: No   Food Insecurity: Low Risk  (2/19/2025)    Food Insecurity      Within the past 12 months, did you worry that your food would run out before you got money to buy more?: No      Within the past 12 months, did the food you bought just not last and you didn t have money to get more?: No   Transportation Needs: Low Risk  (2/19/2025)    Transportation Needs      Within the past 12 months, has lack of transportation kept you from medical appointments, getting your medicines, non-medical meetings or appointments, work, or from getting things that you need?: No   Physical Activity: Unknown (2/19/2025)    Exercise Vital Sign      Days of Exercise per Week: 0 days      Minutes of Exercise per Session: Not on file   Stress: Stress Concern Present (2/19/2025)    [employer] [place] [employer] - Occupational Stress Questionnaire      Feeling of Stress : Very  much   Social Connections: Unknown (2/19/2025)    Social Connection and Isolation Panel [NHANES]      Frequency of Communication with Friends and Family: Not on file      Frequency of Social Gatherings with Friends and Family: Never      Attends Pent[employer] Services: Not on file      Active Member of Clubs or Organizations: Not on file      Attends Club or Organization Meetings: Not on file      Marital Status: Not on file   Interpersonal Safety: Low Risk  (8/23/2024)    Interpersonal Safety      Do you feel physically and emotionally safe where you currently live?: Yes      Within the past 12 months, have you been hit, slapped, kicked or otherwise physically hurt by someone?: No      Within the past 12 months, have you been humiliated or emotionally abused in other ways by your partner or ex-partner?: No   Housing Stability: High Risk (2/19/2025)    Housing Stability      Do you have housing? : Yes      Are you worried about losing your housing?: Yes       Current Outpatient Medications   Medication Sig Dispense Refill     atomoxetine (STRATTERA) 18 MG capsule Take 1 capsule (18 mg) by mouth daily. 30 capsule 1     cetirizine (ZYRTEC) 10 MG tablet Take 1 tablet (10 mg) by mouth daily. 90 tablet 1     cloNIDine (CATAPRES) 0.1 MG tablet Take 1 tablet (0.1 mg) by mouth at bedtime. 30 tablet 1     fluticasone (FLONASE) 50 MCG/ACT nasal spray Spray 1 spray into both nostrils daily. 16 g 3     omeprazole (PRILOSEC) 20 MG DR capsule Take 1 capsule (20 mg) by mouth daily. 90 capsule 3       No Known Allergies    REVIEW OF SYSTEMS:  CONSTITUTIONAL:  NEGATIVE for fever, chills, change in weight, not feeling tired  SKIN:  NEGATIVE for worrisome rashes, no skin lumps, no skin ulcers and no non-healing wounds  EYES:  NEGATIVE for vision changes or irritation.  ENT/MOUTH:  NEGATIVE.  No hearing loss, no hoarseness, no difficulty swallowing.  RESP:  NEGATIVE. No cough or shortness of breath.  CV:  NEGATIVE for chest pain,  "palpitations or peripheral edema  GI:  NEGATIVE for nausea, abdominal pain, heartburn, or change in bowel habits  :  Negative. No dysuria, no hematuria  MUSCULOSKELETAL:  See HPI above  NEURO:  NEGATIVE . No headaches, no dizziness,  no numbness  ENDOCRINE:  NEGATIVE for temperature intolerance, skin/hair changes  HEME/ALLERGY/IMMUNE:  NEGATIVE for bleeding problems  PSYCHIATRIC:  NEGATIVE. no anxiety, no depression.     Exam:  Vitals: Resp 16   Ht 1.727 m (5' 8\")   Wt 81.6 kg (180 lb)   BMI 27.37 kg/m    BMI= Body mass index is 27.37 kg/m .  Constitutional:  healthy, alert and no distress  Neuro: Alert and Oriented x 3, no focal defects. Very impulsive.  Psych: Affect normal   Respiratory: Breathing not labored.  Cardiovascular: normal peripheral pulses  Lymph: no adenopathy  Skin: No rashes,worrisome lesions or skin problems  He has full range of motion of both knees from 0 to 130 degrees.  He has mild effusion in the right knee, negative on the left.  He has significant tenderness at the medial joint line on the right and jumps when this is touched.    He had negative medial and lateral Medardo's, but it was difficult to do as he twisted his body and protects the knee as it is manipulated.  There is no ligamentous laxity of either knee.  Sensation, motor and circulation are intact.    Assessment:  right knee osteoarthritis with medial wear.  Plan: We have gone over extensively the conservative and surgical treatment options.  He would like to continue with the Tylenol and obtain an  brace.      Again, thank you for allowing me to participate in the care of your patient.        Sincerely,        Bon Watkins MD    Electronically signed"

## 2025-06-19 NOTE — PROGRESS NOTES
Alejandro Alves is a 59 year old male who is seen in consultation at the request of Dr. Rhodes for right knee pain.  He has had this for at least the last couple years.  He recalls no definite history of injury.  He has some constant aching but especially sharp shooting pains if he twists wrong.  His pain gets as bad as 6-7 out of 10 when it occurs.  He has taken Tylenol.  He has not taken anti-inflammatories or had any cortisone shots.  He is not employed and is here with his sister who helps him with medical and financial decision making.    X-ray shows moderate osteoarthritis of the right knee.  There is definite medial space narrowing.  On the lateral view it is difficult to tell if it might be bone-on-bone.    Past Medical History:   Diagnosis Date    Tobacco use disorder        Past Surgical History:   Procedure Laterality Date    COLONOSCOPY N/A 6/6/2016    Procedure: COMBINED COLONOSCOPY, SINGLE OR MULTIPLE BIOPSY/POLYPECTOMY BY BIOPSY;  Surgeon: Theron Vigil MD;  [facility]    LAPAROSCOPIC HERNIORRHAPHY INGUINAL BILATERAL Bilateral 4/15/2019    Procedure: Laparoscopic Bilateral Inguinal Herniorrhaphy with Mesh;  Surgeon: Mercedes Suazo MD;  [facility]    REMOVE FOREIGN BODY LOWER EXTREMITY Left 3/19/2019    Procedure: FOREIGN BODY REMOVAL LEFT KNEE;  Surgeon: Bon Watkins MD;  Location: [facility]       Family History   Problem Relation Age of Onset    Hypertension Father     Heart Disease Father     Heart Disease Paternal Grandfather     Breast Cancer Sister        Social History     Socioeconomic History    Marital status: Single     Spouse name: Not on file    Number of children: Not on file    Years of education: Not on file    Highest education level: Not on file   Occupational History    Not on file   Tobacco Use    Smoking status: Every Day     Current packs/day: 1.00     Average packs/day: 1 pack/day for 30.7 years (30.7 ttl pk-yrs)     Types: Cigarettes     Start  date: 10/19/1994    Smokeless tobacco: Never    Tobacco comments:     5/20/25 - pt states he smokes about half a pack per day.               patient states he would like to    Substance and Sexual Activity    Alcohol use: No    Drug use: Not Currently     Types: Cocaine, Methamphetamines     Comment: minimal    Sexual activity: Not Currently     Partners: Female     Birth control/protection: None   Other Topics Concern    Parent/sibling w/ CABG, MI or angioplasty before 65F 55M? No   Social History Narrative    Not on file     Social Drivers of Health     Financial Resource Strain: Low Risk  (2/19/2025)    Financial Resource Strain     Within the past 12 months, have you or your family members you live with been unable to get utilities (heat, electricity) when it was really needed?: No   Food Insecurity: Low Risk  (2/19/2025)    Food Insecurity     Within the past 12 months, did you worry that your food would run out before you got money to buy more?: No     Within the past 12 months, did the food you bought just not last and you didn t have money to get more?: No   Transportation Needs: Low Risk  (2/19/2025)    Transportation Needs     Within the past 12 months, has lack of transportation kept you from medical appointments, getting your medicines, non-medical meetings or appointments, work, or from getting things that you need?: No   Physical Activity: Unknown (2/19/2025)    Exercise Vital Sign     Days of Exercise per Week: 0 days     Minutes of Exercise per Session: Not on file   Stress: Stress Concern Present (2/19/2025)    [employer] [place] [employer] - Occupational Stress Questionnaire     Feeling of Stress : Very much   Social Connections: Unknown (2/19/2025)    Social Connection and Isolation Panel [NHANES]     Frequency of Communication with Friends and Family: Not on file     Frequency of Social Gatherings with Friends and Family: Never     Attends Voodoo Services: Not on file     Active  Member of Clubs or Organizations: Not on file     Attends Club or Organization Meetings: Not on file     Marital Status: Not on file   Interpersonal Safety: Low Risk  (8/23/2024)    Interpersonal Safety     Do you feel physically and emotionally safe where you currently live?: Yes     Within the past 12 months, have you been hit, slapped, kicked or otherwise physically hurt by someone?: No     Within the past 12 months, have you been humiliated or emotionally abused in other ways by your partner or ex-partner?: No   Housing Stability: High Risk (2/19/2025)    Housing Stability     Do you have housing? : Yes     Are you worried about losing your housing?: Yes       Current Outpatient Medications   Medication Sig Dispense Refill    atomoxetine (STRATTERA) 18 MG capsule Take 1 capsule (18 mg) by mouth daily. 30 capsule 1    cetirizine (ZYRTEC) 10 MG tablet Take 1 tablet (10 mg) by mouth daily. 90 tablet 1    cloNIDine (CATAPRES) 0.1 MG tablet Take 1 tablet (0.1 mg) by mouth at bedtime. 30 tablet 1    fluticasone (FLONASE) 50 MCG/ACT nasal spray Spray 1 spray into both nostrils daily. 16 g 3    omeprazole (PRILOSEC) 20 MG DR capsule Take 1 capsule (20 mg) by mouth daily. 90 capsule 3       No Known Allergies    REVIEW OF SYSTEMS:  CONSTITUTIONAL:  NEGATIVE for fever, chills, change in weight, not feeling tired  SKIN:  NEGATIVE for worrisome rashes, no skin lumps, no skin ulcers and no non-healing wounds  EYES:  NEGATIVE for vision changes or irritation.  ENT/MOUTH:  NEGATIVE.  No hearing loss, no hoarseness, no difficulty swallowing.  RESP:  NEGATIVE. No cough or shortness of breath.  CV:  NEGATIVE for chest pain, palpitations or peripheral edema  GI:  NEGATIVE for nausea, abdominal pain, heartburn, or change in bowel habits  :  Negative. No dysuria, no hematuria  MUSCULOSKELETAL:  See HPI above  NEURO:  NEGATIVE . No headaches, no dizziness,  no numbness  ENDOCRINE:  NEGATIVE for temperature intolerance, skin/hair  "changes  HEME/ALLERGY/IMMUNE:  NEGATIVE for bleeding problems  PSYCHIATRIC:  NEGATIVE. no anxiety, no depression.     Exam:  Vitals: Resp 16   Ht 1.727 m (5' 8\")   Wt 81.6 kg (180 lb)   BMI 27.37 kg/m    BMI= Body mass index is 27.37 kg/m .  Constitutional:  healthy, alert and no distress  Neuro: Alert and Oriented x 3, no focal defects. Very impulsive.  Psych: Affect normal   Respiratory: Breathing not labored.  Cardiovascular: normal peripheral pulses  Lymph: no adenopathy  Skin: No rashes,worrisome lesions or skin problems  He has full range of motion of both knees from 0 to 130 degrees.  He has mild effusion in the right knee, negative on the left.  He has significant tenderness at the medial joint line on the right and jumps when this is touched.    He had negative medial and lateral Medardo's, but it was difficult to do as he twisted his body and protects the knee as it is manipulated.  There is no ligamentous laxity of either knee.  Sensation, motor and circulation are intact.    Assessment:  right knee osteoarthritis with medial wear.  Plan: We have gone over extensively the conservative and surgical treatment options.  He would like to continue with the Tylenol and obtain an  brace.      "

## 2025-07-16 ENCOUNTER — PATIENT OUTREACH (OUTPATIENT)
Dept: FAMILY MEDICINE | Facility: CLINIC | Age: 60
End: 2025-07-16
Payer: COMMERCIAL

## 2025-07-16 NOTE — PROGRESS NOTES
Clinic Care Coordination Contact  Follow Up Progress Note      Assessment: sister said things are about the same.  Will see psychiatry again on 7/21/25. She has been in contact with the [facility] for a  CM and pt does not qualify as he has not had any hospitalizations due to his MH, per sister.     Right now they are status quo for progress.  They need to wait for pt to be willing to consider counseling to pursue that and right now he is not willing.  There is also the factor of how challenging it is to get pt ready/willing to go to any appointments as it takes his sister about a weeks prep to get him to appts.     Care Gaps:    Health Maintenance Due   Topic Date Due    ZOSTER VACCINE (1 of 2) Never done    LUNG CANCER SCREENING  Never done    PNEUMOCOCCAL VACCINE 50+ YEARS (2 of 2 - PPSV23) 01/21/2019    COVID-19 VACCINE (1 - 2024-25 season) Never done    NICOTINE/TOBACCO CESSATION COUNSELING Q 1 YR  08/12/2025       Postponed to future office visits.      Care Plans  Care Plan: Mental Health       Problem: Mental Health Symptoms Need Improvement       Long-Range Goal: Improve management of mental health symptoms and establish with mental health/psychosocial supports       Start Date: 8/23/2024 Expected End Date: 8/23/2025    This Visit's Progress: 40% Recent Progress: 40%    Note:     Barriers: unknown concerns  Strengths: supportive family   Patient expressed understanding of goal: yes  Action steps to achieve this goal:  1. I will complete a neuropsych assessment (completed 1/25)  2. I will complete a diagnostic assessment with mental health provider and counseling - working on getting Reilly more receptive to attending  3. I will work with care coordination to identify and connect with resources as assessments are completed, such as Replaced by [facility] , Replaced by [facility] supports, etc    Psychiatry scheduled 5/20/25, 7/21/25                              Intervention/Education provided during outreach: encouraged   to find the balance between helping Reilly and avoiding burnout.      Outreach Frequency: monthly, more frequently as needed      Plan:   Pt to attend psychiatry appt on 7/21/25.  Care Coordinator will follow up in about one month with a miiCard message for scheduling next appt.     MESSI Muñoz   [facility] - Care Coordination  [facility]   [phone]

## 2025-07-21 ENCOUNTER — VIRTUAL VISIT (OUTPATIENT)
Dept: PSYCHIATRY | Facility: CLINIC | Age: 60
End: 2025-07-21
Payer: COMMERCIAL

## 2025-07-21 DIAGNOSIS — F90.1 ATTENTION DEFICIT HYPERACTIVITY DISORDER (ADHD), PREDOMINANTLY HYPERACTIVE TYPE: Primary | ICD-10-CM

## 2025-07-21 DIAGNOSIS — F17.200 TOBACCO USE DISORDER: ICD-10-CM

## 2025-07-21 DIAGNOSIS — F41.1 GAD (GENERALIZED ANXIETY DISORDER): ICD-10-CM

## 2025-07-21 DIAGNOSIS — F19.10 DRUG ABUSE (H): ICD-10-CM

## 2025-07-21 PROCEDURE — 98006 SYNCH AUDIO-VIDEO EST MOD 30: CPT | Performed by: STUDENT IN AN ORGANIZED HEALTH CARE EDUCATION/TRAINING PROGRAM

## 2025-07-21 PROCEDURE — G2211 COMPLEX E/M VISIT ADD ON: HCPCS | Mod: 95 | Performed by: STUDENT IN AN ORGANIZED HEALTH CARE EDUCATION/TRAINING PROGRAM

## 2025-07-21 RX ORDER — ATOMOXETINE 40 MG/1
40 CAPSULE ORAL DAILY
Qty: 30 CAPSULE | Refills: 1 | Status: SHIPPED | OUTPATIENT
Start: 2025-07-21

## 2025-07-21 RX ORDER — CLONIDINE HYDROCHLORIDE 0.2 MG/1
0.2 TABLET ORAL AT BEDTIME
Qty: 30 TABLET | Refills: 1 | Status: SHIPPED | OUTPATIENT
Start: 2025-07-21

## 2025-07-21 ASSESSMENT — PAIN SCALES - GENERAL: PAINLEVEL_OUTOF10: NO PAIN (0)

## 2025-07-21 NOTE — PROGRESS NOTES
Virtual Visit Details    Type of service:  Video Visit     Originating Location (pt. Location): Home    Distant Location (provider location):  Off-site  Platform used for Video Visit: Christopher  START: 2:40  END: 3p       CARE TEAM:    PCP- Tyrone Rhodes  Therapist- external provider.       Diagnoses        Attention deficit hyperactivity disorder (ADHD), predominantly hyperactive type  NIMO (generalized anxiety disorder)  Drug abuse (H)  Tobacco use disorder     Assessment     Pt presents to clinic for follow up. Conditions better controlled. Increasing strattera to 40mg and increasing clonidine 0.2mg to treat insomnia, anxiety, executive dysfunction, and ADHD. Working on connecting with a regular therapist. Making the following addendum to pt's history as previous note was inaccurate: 1999 hospitalization for partial hospitalization for treatment program due to spinal fusion. See HPI below as a reflection of this addendum moving forward.       Future Considerations: increasing clonidine/Strattera as tolerated.     Psychotropic Drug Interactions:   none  Management: routine monitoring    [place]: not using controlled substances    Risk Statements:   Treatment Risk- Risks, benefits, alternatives and potential adverse effects have been discussed and are understood.   Safety Risk-Reilly Grover did not appear to be an imminent safety risk to self or others.     Plan     1) Medications:     INCREASE:      atomoxetine (STRATTERA) 40 MG capsule, Take 1 capsule (40 mg) by mouth daily., Disp: 30 capsule, Rfl: 1    cloNIDine (CATAPRES) 0.2 MG tablet, Take 1 tablet (0.2 mg) by mouth at bedtime., Disp: 30 tablet, Rfl: 1        2) Psychotherapy: continue    3) Next due:  Labs- Routine monitoring is not indicated for current psychotropic medication regimen   EKG- Routine monitoring is not indicated for current psychotropic medication regimen   Rating scales- none needed    4) Referrals: none    5) Other: none    6)  "Follow-up: Return to clinic in 4-6 weeks.        Pertinent Background                                                   [most recent eval 07/21/25]     Established care on 5/20/25.     Notes he has ADHD. Accompanied by sister that helps with  and medical management. Has not been treated. Since 2016 has gone downhill. Show signs of depression and sister believes anixety is contritubuting to anxiety. Medication in the past: 1999 diagnosed with ADHD for stimulant use was not effective. Has self medicated in his 20s. Notes he has many tasks and talks a \"mile a minute\" and in the past using cocaine which he noted is calmed him down. Notes he has been high functioning. Since then since 44 years old to now has been wearing his diagnosis on his sleeve notes increased forgetfulness. Notes symptoms has been exacerbated. Notes neurocognitive changes. Notes zero time management due to executive dysfunction. 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 Notes he isolates and family is supportive. Sister is main person who contacts him. Notes Father and Step Mother are supportive. No social interaction and quality of life. Dr Rhodes has been treating him to get on a sleep schedule.     Reilly  is a 59 year old White Not  or  individual presenting for psychiatric evaluation and medication management through the [facility] . Information is obtained from patient and available records.  Reports history of ADHD and drug use, and memory loss. No history of hospitalizations. Hx of suicidal ideation, no suicide attempts. No history of self-injurious behaviors. Genetically loaded for  ADHD. Grew up in a chaotic environment experiencing loss of friends, physical trauma, loss of Mother and these life events are likely contributing to the clinical picture.  History and interview support the following diagnoses:        Attention deficit " hyperactivity disorder (ADHD), predominantly hyperactive type  NIMO (generalized anxiety disorder)  Drug abuse (H)  Memory loss  Low average cognitive ability    Medication discussion:   Primary mood support medications:   Patient presents today to establish psychiatric care ADHD is not controlled.  He is currently in active methamphetamine use we are starting him on Strattera 18 mg to treat ADHD we are also starting clonidine 0.1 mg at bedtime to help with physical symptoms and anxiety and executive dysfunction with the hope of addressing insomnia as well.  I did discuss the risks and benefits of concurrent methamphetamine use with Strattera and both clonidine at this time he is agreeable to reducing the use of methamphetamine he does appear future oriented and plans to reduce his use.  I did offer to place a referral to addiction medicine or to a therapist specializing in his substance use to further assess and treat patient is not interested at this time.  I also suspect his cognitive functioning is contributing to his presentation as well which is most likely contributing to increased feelings of anxiety worsening ADHD symptoms and worsening impulse control.  He does appear to have good social supports and people in his life.     1) PSYCHOTROPIC MEDICATION RECOMMENDATIONS:  START:     START:     atomoxetine (STRATTERA) 18 MG capsule, Take 1 capsule (18 mg) by mouth daily., Disp: 30 capsule, Rfl: 1    cloNIDine (CATAPRES) 0.1 MG tablet, Take 1 tablet (0.1 mg) by mouth at bedtime., Disp: 30 tablet, Rfl: 1       Subjective     Since the last visit:   -notes he has been doing better notes still feeling. Notes his symptoms have been better. Has been struggling with anxiety from day to day. Would increase the strattera and notes his sleep is still disrupted. Trying to get him scheduled at a normal bedtime schedule. Notes experiencing anxiety is impacting getting him to sleep. Notes he feels dealing with obligations has  been improved. Notes his spiraling loops have been getting better. Last couple of weeks he has been completing tasks.     Pertinent Social Hx:  FINANCIAL SUPPORT-Notes he has food, energy assistance. Father pays for his mortgage and electric, gas, and water, and phone. Applying for ssi. Sister notes he may not qualify for disability.   LIVING SITUATION / RELATIONSHIPS- alone   SOCIAL/ SPIRITUAL SUPPORT-notes familial support but friendships have dwindled.     Pertinent Substance Use  Alcohol-none  Nicotine- yes.   Caffeine-soda.   Opioids-none   Narcan Kit- N/A  THC/CBD- None  Other Illicit Drugs- methamphetamine    Medical Review of Systems:   Lightheadedness/orthostasis: None  Headaches: None  GI: none  Sexual health concerns: None     Mental Status Exam     General/Constitutional:  Appearance:  awake, alert, adequately groomed, appeared stated age and no apparent distress  Attitude:   cooperative   Eye Contact:  good  Musculoskeletal:  Psychomotor Behavior:  no evidence of tardive dyskinesia, dystonia, or tics from the head up  Psychiatric:  Speech:  clear, coherent, regular rate, rhythm, and volume,  No pressure speech noted.  Associations:  no loose associations  Thought Process:  logical, linear and goal oriented  Thought Content:   No evidence of suicidal ideation or homicidal ideation, no evidence of psychotic thought, no auditory hallucinations present and no visual hallucinations present  Mood:  good  Affect:  full range/stable (normal variation of emotions during exam) and was congruent to speech content.  Insight:  good  Judgment:  intact, adequate for safety  Impulse Control:  intact  Neurological:  Oriented to:  person, place, time, and situation  Attention Span and Concentration:  Able to attend to the interview     Language: intact    Recent and Remote Memory:  Intact to interview. Not formally assessed. No amnesia.   Fund of Knowledge: appropriate        Past Psych Med Trials        Medication Max  Dose (mg) Dates / Duration Helpful? DC Reason / Adverse Effects?   Lorazepam        hydroxyzine                                                    Treatment Course and Sims Events since  JULY 2023 5/20/25: established care. Started clonidine 0.1mg and strattera 18mg.   7/21/25: increased clonidine to 0.2mg and strattera to 40mg.      Vitals   There were no vitals taken for this visit.  Pulse Readings from Last 3 Encounters:   03/04/25 118   02/19/25 82   09/25/24 69     Wt Readings from Last 3 Encounters:   06/19/25 81.6 kg (180 lb)   03/04/25 82.6 kg (182 lb)   02/19/25 82.9 kg (182 lb 12.8 oz)     BP Readings from Last 3 Encounters:   03/04/25 (!) 115/97   02/19/25 132/85   09/25/24 130/74        Medical History     ALLERGIES: Patient has no known allergies.    Patient Active Problem List   Diagnosis    Tobacco use disorder    Restless legs syndrome (RLS)    Hip pain, right    Subacromial impingement, right    Superior glenoid labrum lesion of right shoulder, initial encounter    Incomplete tear of right rotator cuff    Acute foreign body of left knee, initial encounter    Foreign body of knee, left, initial encounter    Peritonsillar abscess    Hernia, abdominal        Medications     Current Outpatient Medications   Medication Sig Dispense Refill    atomoxetine (STRATTERA) 18 MG capsule Take 1 capsule (18 mg) by mouth daily. 30 capsule 1    cetirizine (ZYRTEC) 10 MG tablet Take 1 tablet (10 mg) by mouth daily. 90 tablet 1    cloNIDine (CATAPRES) 0.1 MG tablet Take 1 tablet (0.1 mg) by mouth at bedtime. 30 tablet 1    fluticasone (FLONASE) 50 MCG/ACT nasal spray Spray 1 spray into both nostrils daily. 16 g 3    omeprazole (PRILOSEC) 20 MG DR capsule Take 1 capsule (20 mg) by mouth daily. 90 capsule 3        Labs and Data         5/19/2025     5:13 PM 6/17/2025    11:45 AM   PROMIS-10 Total Score w/o Sub Scores   PROMIS TOTAL - SUBSCORES 19 18        Patient-reported         5/19/2025     5:49 PM   CAGE-AID  Total Score   Total Score 2   Total Score MyChart 2 (A total score of 2 or greater is considered clinically significant)         2/19/2025     3:33 PM 5/19/2025     5:47 PM   PHQ-9 SCORE   PHQ-9 Total Score MyChart 20 (Severe depression) 16 (Moderately severe depression)   PHQ-9 Total Score 20  16        Patient-reported         2/19/2025     3:45 PM 5/19/2025     5:09 PM   NIMO-7 SCORE   Total Score 20 (severe anxiety) 19 (severe anxiety)   Total Score 20  19        Patient-reported       Liver/Kidney Function, TSH Metabolic Blood counts   Recent Labs   Lab Test 08/12/24  0757 05/26/23  1523   AST 23  --    ALT 21  --    ALKPHOS 97  --    CR 0.90 0.88     Recent Labs   Lab Test 09/25/24  1652   TSH 1.71    Recent Labs   Lab Test 05/26/23  1523   CHOL 176   TRIG 80   LDL 94   HDL 66     Recent Labs   Lab Test 08/12/24  0757   A1C 5.6     Recent Labs   Lab Test 08/12/24  0757   GLC 69*    Recent Labs   Lab Test 08/12/24  0757   WBC 7.1   HGB 13.6   HCT 40.1   MCV 91           Administrative/Billing:   The longitudinal plan of care for the diagnosis(es)/condition(s) as documented were addressed during this visit. Due to the added complexity in care, I will continue to support Reilly in the subsequent management and with ongoing continuity of care.        PROVIDER: Rito Pacheco PA-C

## 2025-07-21 NOTE — NURSING NOTE
Current patient location: [address] [place] [id number]    Is the patient currently in the state of [place]? YES    Visit mode: VIDEO    If the visit is dropped, the patient can be reconnected by:VIDEO VISIT: Text to cell phone:   Telephone Information:   Mobile [phone]    and VIDEO VISIT: Send to e-mail at: [email or web address]    Will anyone else be joining the visit? NO  (If patient encounters technical issues they should call [phone]    Are changes needed to the allergy or medication list? No    Are refills needed on medications prescribed by this physician? Discuss with provider    Rooming Documentation:  Questionnaire(s) completed    Reason for visit: RECHECK    Imani SWARTZ

## 2025-08-06 ENCOUNTER — THERAPY VISIT (OUTPATIENT)
Dept: PHYSICAL THERAPY | Facility: CLINIC | Age: 60
End: 2025-08-06
Attending: ORTHOPAEDIC SURGERY
Payer: COMMERCIAL

## 2025-08-06 DIAGNOSIS — G89.29 CHRONIC PAIN OF RIGHT KNEE: ICD-10-CM

## 2025-08-06 DIAGNOSIS — M25.561 RIGHT KNEE PAIN, UNSPECIFIED CHRONICITY: Primary | ICD-10-CM

## 2025-08-06 DIAGNOSIS — M25.561 CHRONIC PAIN OF RIGHT KNEE: ICD-10-CM

## 2025-08-06 PROCEDURE — 97161 PT EVAL LOW COMPLEX 20 MIN: CPT | Mod: GP | Performed by: PHYSICAL THERAPIST

## 2025-08-13 ENCOUNTER — THERAPY VISIT (OUTPATIENT)
Dept: PHYSICAL THERAPY | Facility: CLINIC | Age: 60
End: 2025-08-13
Attending: ORTHOPAEDIC SURGERY
Payer: COMMERCIAL

## 2025-08-13 DIAGNOSIS — M25.561 CHRONIC PAIN OF RIGHT KNEE: ICD-10-CM

## 2025-08-13 DIAGNOSIS — M25.561 RIGHT KNEE PAIN, UNSPECIFIED CHRONICITY: Primary | ICD-10-CM

## 2025-08-13 DIAGNOSIS — G89.29 CHRONIC PAIN OF RIGHT KNEE: ICD-10-CM

## 2025-08-13 PROCEDURE — 97110 THERAPEUTIC EXERCISES: CPT | Mod: GP | Performed by: PHYSICAL THERAPIST

## 2025-08-13 ASSESSMENT — ACTIVITIES OF DAILY LIVING (ADL)
STIFFNESS: I HAVE THE SYMPTOM BUT IT DOES NOT AFFECT MY ACTIVITY
SQUAT: ACTIVITY IS FAIRLY DIFFICULT
RISE FROM A CHAIR: ACTIVITY IS SOMEWHAT DIFFICULT
WEAKNESS: THE SYMPTOM AFFECTS MY ACTIVITY SLIGHTLY
SIT WITH YOUR KNEE BENT: ACTIVITY IS MINIMALLY DIFFICULT
KNEE_ACTIVITY_OF_DAILY_LIVING_SUM: 45
KNEE_ACTIVITY_OF_DAILY_LIVING_SCORE: 64.29
LIMPING: THE SYMPTOM AFFECTS MY ACTIVITY SLIGHTLY
PAIN: I HAVE THE SYMPTOM BUT IT DOES NOT AFFECT MY ACTIVITY
STAND: ACTIVITY IS MINIMALLY DIFFICULT
GIVING WAY, BUCKLING OR SHIFTING OF KNEE: THE SYMPTOM AFFECTS MY ACTIVITY SLIGHTLY
WALK: ACTIVITY IS MINIMALLY DIFFICULT
GO UP STAIRS: ACTIVITY IS SOMEWHAT DIFFICULT
RAW_SCORE: 45
GO DOWN STAIRS: ACTIVITY IS SOMEWHAT DIFFICULT
KNEEL ON THE FRONT OF YOUR KNEE: ACTIVITY IS VERY DIFFICULT
SWELLING: I HAVE THE SYMPTOM BUT IT DOES NOT AFFECT MY ACTIVITY

## 2025-08-22 PROBLEM — Z96.651 CHRONIC KNEE PAIN AFTER TOTAL REPLACEMENT OF RIGHT KNEE JOINT: Status: ACTIVE | Noted: 2025-08-22

## 2025-08-22 PROBLEM — M25.561 CHRONIC KNEE PAIN AFTER TOTAL REPLACEMENT OF RIGHT KNEE JOINT: Status: ACTIVE | Noted: 2025-08-22

## 2025-08-22 PROBLEM — G89.29 CHRONIC KNEE PAIN AFTER TOTAL REPLACEMENT OF RIGHT KNEE JOINT: Status: ACTIVE | Noted: 2025-08-22

## 2025-08-26 ENCOUNTER — PATIENT OUTREACH (OUTPATIENT)
Dept: FAMILY MEDICINE | Facility: CLINIC | Age: 60
End: 2025-08-26
Payer: COMMERCIAL

## (undated) DEVICE — CLIP APPLIER ENDO 5MM M/L LIGAMAX EL5ML

## (undated) DEVICE — ENDO CANNULA 05MM VERSAONE UNIVERSAL UNVCA5STF

## (undated) DEVICE — ESU PENCIL W/HOLSTER

## (undated) DEVICE — ESU ENDO SCISSORS 5MM CVD 5DCS

## (undated) DEVICE — Device

## (undated) DEVICE — SU MONOCRYL 3-0 PS-2 27" Y427H

## (undated) DEVICE — CATH TRAY FOLEY 16FR DRAINAGE BAG STATLOCK 899916

## (undated) DEVICE — SU PDS II 0 ENDOLOOP EZ10G

## (undated) DEVICE — SU DERMABOND ADVANCED .7ML DNX12

## (undated) DEVICE — PREP CHLORAPREP 26ML TINTED ORANGE  260815

## (undated) DEVICE — NDL INSUFFLATION 13GA 120MM C2201

## (undated) DEVICE — SU DERMABOND ADVANCED .7ML DNX6

## (undated) DEVICE — DRSG GAUZE 2X2" 8042

## (undated) DEVICE — DISSECTOR BALLOON SPACEMAKER PRO BTT & OVAL SMBTTOVLX

## (undated) DEVICE — PACK GENERAL LAPAOSCOPY

## (undated) DEVICE — ENDO DISSECTOR BLUNT 05MM 3/PK 173019

## (undated) RX ORDER — FENTANYL CITRATE 50 UG/ML
INJECTION, SOLUTION INTRAMUSCULAR; INTRAVENOUS
Status: DISPENSED
Start: 2019-03-19

## (undated) RX ORDER — ONDANSETRON 2 MG/ML
INJECTION INTRAMUSCULAR; INTRAVENOUS
Status: DISPENSED
Start: 2019-04-15

## (undated) RX ORDER — FENTANYL CITRATE 50 UG/ML
INJECTION, SOLUTION INTRAMUSCULAR; INTRAVENOUS
Status: DISPENSED
Start: 2019-04-15

## (undated) RX ORDER — KETOROLAC TROMETHAMINE 30 MG/ML
INJECTION, SOLUTION INTRAMUSCULAR; INTRAVENOUS
Status: DISPENSED
Start: 2019-03-19

## (undated) RX ORDER — BUPIVACAINE HYDROCHLORIDE AND EPINEPHRINE 5; 5 MG/ML; UG/ML
INJECTION, SOLUTION EPIDURAL; INTRACAUDAL; PERINEURAL
Status: DISPENSED
Start: 2019-04-15

## (undated) RX ORDER — LIDOCAINE HYDROCHLORIDE 20 MG/ML
INJECTION, SOLUTION EPIDURAL; INFILTRATION; INTRACAUDAL; PERINEURAL
Status: DISPENSED
Start: 2019-04-15

## (undated) RX ORDER — DEXAMETHASONE SODIUM PHOSPHATE 10 MG/ML
INJECTION, SOLUTION INTRAMUSCULAR; INTRAVENOUS
Status: DISPENSED
Start: 2019-04-15

## (undated) RX ORDER — DIMENHYDRINATE 50 MG/ML
INJECTION, SOLUTION INTRAMUSCULAR; INTRAVENOUS
Status: DISPENSED
Start: 2019-04-15

## (undated) RX ORDER — PHENYLEPHRINE HCL IN 0.9% NACL 1 MG/10 ML
SYRINGE (ML) INTRAVENOUS
Status: DISPENSED
Start: 2019-03-19

## (undated) RX ORDER — ONDANSETRON 2 MG/ML
INJECTION INTRAMUSCULAR; INTRAVENOUS
Status: DISPENSED
Start: 2019-03-19